# Patient Record
Sex: MALE | Race: BLACK OR AFRICAN AMERICAN | NOT HISPANIC OR LATINO | Employment: UNEMPLOYED | URBAN - METROPOLITAN AREA
[De-identification: names, ages, dates, MRNs, and addresses within clinical notes are randomized per-mention and may not be internally consistent; named-entity substitution may affect disease eponyms.]

---

## 2020-02-18 ENCOUNTER — OFFICE VISIT (OUTPATIENT)
Dept: INTERNAL MEDICINE CLINIC | Facility: CLINIC | Age: 45
End: 2020-02-18
Payer: COMMERCIAL

## 2020-02-18 VITALS
BODY MASS INDEX: 26.8 KG/M2 | TEMPERATURE: 99.2 F | SYSTOLIC BLOOD PRESSURE: 138 MMHG | HEIGHT: 71 IN | DIASTOLIC BLOOD PRESSURE: 80 MMHG | OXYGEN SATURATION: 99 % | HEART RATE: 68 BPM | RESPIRATION RATE: 20 BRPM | WEIGHT: 191.4 LBS

## 2020-02-18 DIAGNOSIS — Z71.6 ENCOUNTER FOR SMOKING CESSATION COUNSELING: ICD-10-CM

## 2020-02-18 DIAGNOSIS — Z13.220 SCREENING FOR HYPERCHOLESTEROLEMIA: Primary | ICD-10-CM

## 2020-02-18 DIAGNOSIS — F17.210 CONTINUOUS DEPENDENCE ON CIGARETTE SMOKING: ICD-10-CM

## 2020-02-18 DIAGNOSIS — Z13.1 SCREENING FOR DIABETES MELLITUS: ICD-10-CM

## 2020-02-18 DIAGNOSIS — R68.3 CLUBBING OF NAIL: ICD-10-CM

## 2020-02-18 DIAGNOSIS — Z11.4 SCREENING FOR HIV (HUMAN IMMUNODEFICIENCY VIRUS): ICD-10-CM

## 2020-02-18 PROBLEM — F51.01 PRIMARY INSOMNIA: Status: ACTIVE | Noted: 2020-02-18

## 2020-02-18 PROCEDURE — 99407 BEHAV CHNG SMOKING > 10 MIN: CPT | Performed by: INTERNAL MEDICINE

## 2020-02-18 PROCEDURE — 3008F BODY MASS INDEX DOCD: CPT | Performed by: INTERNAL MEDICINE

## 2020-02-18 PROCEDURE — 99204 OFFICE O/P NEW MOD 45 MIN: CPT | Performed by: INTERNAL MEDICINE

## 2020-02-18 RX ORDER — VARENICLINE TARTRATE 25 MG
KIT ORAL
Qty: 53 TABLET | Refills: 0 | Status: SHIPPED | OUTPATIENT
Start: 2020-02-18 | End: 2020-09-02 | Stop reason: HOSPADM

## 2020-02-18 NOTE — ASSESSMENT & PLAN NOTE
This is likely secondary to chronic smoking, his oxygen saturation level was 99% now office today  He does not have any shortness of breath, or cough

## 2020-02-18 NOTE — ASSESSMENT & PLAN NOTE
Patient have a very busy schedule, sleeping hygiene was reviewed with the patient  He was instructed not to use electronic device before going to bed, avoid heavy meals, to not smoke before going to bed  I also recommended the use of melatonin 3 mg every night to help him with insomnia  He does have a busy working schedule, and does work night shifts

## 2020-02-18 NOTE — PATIENT INSTRUCTIONS
You can take melatonin 3-5 mg every night to help sleep        How to Stop Smoking, Ambulatory Care   GENERAL INFORMATION:   Why you should stop smoking: You will improve your health and the health of others around you if you stop smoking  Your risk of heart and lung disease, cancer, stroke, heart attack, and vision problems will also decrease  You can benefit from quitting no matter how long you have smoked  Quitting may even prolong your life  Prepare to stop smoking:  Nicotine is a highly addictive drug found in cigarettes  Withdrawal symptoms can happen when you stop smoking and make it hard to quit  These include anxiety, depression, irritability, trouble sleeping, and increased appetite  You increase your chances of success if you prepare to quit  · Set a quit date  This will help confirm your decision to stop smoking  · Tell friends and family that you plan to quit  Explain that you may have withdrawal symptoms when you try to quit  Ask them to support you  They may be able to encourage you and help reduce your stress to make it easier for you to quit  · Expect it to be hard to quit, but know you can do it  Smoking is a daily habit that becomes part of your life  Know the triggers that tempt you to smoke, so you can break this habit  Write down a list of these challenges and have a plan to avoid them  · Remove all tobacco and nicotine products from your home, car, and workplace  Also, remove anything else that will tempt you to smoke, such as lighters, matches, or gisell trays  Tools to help you stop smoking: You may be able to quit on your own, or you may need to try one or more of the following:  · Counseling  from trained caregivers will help teach you skills to quit smoking  They will also teach you to manage your withdrawal symptoms and cravings  You may receive counseling from one counselor, in group therapy, or through phone therapy called a quit line       · Nicotine replacement therapy (NRT)  such as nicotine patches, gum, or lozenges may help reduce your nicotine cravings and other withdrawal symptoms  You may get these without a doctor's order  · Prescription medicines  such as nasal sprays or nicotine inhalers may help reduce your withdrawal symptoms  Other medicines may also be used to reduce your urge to smoke  Ask your primary healthcare provider about these medicines  You may need to start certain medicines 2 weeks before your quit date for them to work well  Manage your cravings:   · Avoid situations, people, and places that tempt you to smoke  Go to nonsmoking places, such as libraries or restaurants  Understand what tempts you and try to avoid these things  · Keep your hands busy  Hold things such as a stress ball or pen  Keep lollipops, gum, or toothpicks in your mouth to distract you from your cravings  · Avoid alcohol and caffeine  These drinks may tempt you to smoke  Drink healthy liquids such as water or juice instead  · Reward yourself when you resist your cravings  Rewards will motivate you and help you stay positive  For more support and information:   · [employer]oke[employer]  [employer]  Phone: 9- 435 - 011-1333  Web Address: www smokefree  [employer]  CARE AGREEMENT:   You have the right to help plan your care  Learn about your health condition and how it may be treated  Discuss treatment options with your caregivers to decide what care you want to receive  You always have the right to refuse treatment  The above information is an  only  It is not intended as medical advice for individual conditions or treatments  Talk to your doctor, nurse or pharmacist before following any medical regimen to see if it is safe and effective for you  © 2014 [employer] [employer] is for End User's use only and may not be sold, redistributed or otherwise used for commercial purposes   All illustrations and images included in CareNotes® are the copyrighted property of [employer]  or Jose Angel Lai

## 2020-02-18 NOTE — ASSESSMENT & PLAN NOTE
Patient has smoked 1 pack a day for the past 25 years  He said he tried nicotine patch and gums in the past and that did not work for him  He also tried to  Stop it cold turkey, but he return to  Smoke  He would like to quit  Will prescribe Chantix  Adverse effects and gradually tapering off the use of cigarettes was reviewed with the patient   Plan is to continue treatment for 12 weeks, depending on how he responds  Supportive information was hand-out to the patient  Will follow-up in 4 weeks

## 2020-02-18 NOTE — PROGRESS NOTES
Assessment/Plan:    Continuous dependence on cigarette smoking  Patient has smoked 1 pack a day for the past 25 years  He said he tried nicotine patch and gums in the past and that did not work for him  He also tried to  Stop it cold turkey, but he return to  Smoke  He would like to quit  Will prescribe Chantix  Adverse effects and gradually tapering off the use of cigarettes was reviewed with the patient   Plan is to continue treatment for 12 weeks, depending on how he responds  Supportive information was hand-out to the patient  Will follow-up in 4 weeks  Clubbing of nail   This is likely secondary to chronic smoking, his oxygen saturation level was 99% now office today  He does not have any shortness of breath, or cough  Primary insomnia   Patient have a very busy schedule, sleeping hygiene was reviewed with the patient  He was instructed not to use electronic device before going to bed, avoid heavy meals, to not smoke before going to bed  I also recommended the use of melatonin 3 mg every night to help him with insomnia  He does have a busy working schedule, and does work night shifts  Patient blood pressure was noted to be 138/80 during this visit give he does not have any history of hypertension  Will monitor and check blood pressure again next visit  Routine blood work was ordered and will call him with results  Patient refused flu vaccine and pneumococcal vaccine today  He states that he might take the pneumococcal vaccine next visit  Diagnoses and all orders for this visit:    Screening for hypercholesterolemia  -     Lipid Panel with Direct LDL reflex; Future    Continuous dependence on cigarette smoking  -     varenicline (CHANTIX GIOVANI) 0 5 MG X 11 & 1 MG X 42 tablet; Take one 0 5 mg tablet by mouth once daily for 3 days, then one 0 5 mg tablet by mouth twice daily for 4 days, then one 1 mg tablet by mouth twice daily      Encounter for smoking cessation counseling  - varenicline (CHANTIX GIOVANI) 0 5 MG X 11 & 1 MG X 42 tablet; Take one 0 5 mg tablet by mouth once daily for 3 days, then one 0 5 mg tablet by mouth twice daily for 4 days, then one 1 mg tablet by mouth twice daily  Screening for diabetes mellitus  -     HEMOGLOBIN A1C W/ EAG ESTIMATION; Future    Clubbing of nail  -     CBC and Platelet; Future  -     Comprehensive metabolic panel; Future    Screening for HIV (human immunodeficiency virus)  -     HIV 1/2 AG-AB combo; Future            Subjective:      Patient ID: Kristie Billings is a 38 y o  male  Mr Sean Btets   Is here to establish care  He states that  He has been many years since he saw a medical provider  He denies any medical problem  He denies any current symptoms  He does not have any shortness of breath, cough, chest pain, abdominal pain, nausea, vomiting or diarrhea  He states that he  Smokes 1 pack a day for the past 25 years and he would like to quit  He said he tried multiple modalities and it did not work for him  He said his sister used Chantix in eat worked for her very well  She stopped smoking  He is willing to try  He also is having difficulty with sleeping, he has very busy working schedule, he works night shifts and works 6 days in a week  He would not like to use any addictive medication to make him sleep, but he would like some help  Patient states that he was told his finger tips are big and he is concerned about that  He denies any pain or trauma  His blood pressure in the office was 138/80, he does not recall any history of elevated BP in the past    He has a strong family history of diabetes, both parents and siblings were diabetic  The following portions of the patient's history were reviewed and updated as appropriate: allergies, current medications, past family history, past medical history, past social history, past surgical history and problem list     Review of Systems   Constitutional: Positive for fatigue  Negative for appetite change  HENT: Negative for sore throat and trouble swallowing  Eyes: Negative for visual disturbance  Respiratory: Negative for cough, chest tightness, shortness of breath and wheezing  Cardiovascular: Negative for chest pain, palpitations and leg swelling  Gastrointestinal: Negative for abdominal pain, nausea and vomiting  Genitourinary: Negative for difficulty urinating and frequency  Musculoskeletal: Negative for arthralgias and joint swelling  Skin: Negative for rash  Neurological: Negative for dizziness and headaches  Psychiatric/Behavioral: Positive for sleep disturbance  The patient is not nervous/anxious  Objective:      /80 (BP Location: Left arm, Patient Position: Sitting, Cuff Size: Standard)   Pulse 68   Temp 99 2 °F (37 3 °C)   Resp 20   Ht 5' 11 25" (1 81 m)   Wt 86 8 kg (191 lb 6 4 oz)   SpO2 99%   BMI 26 51 kg/m²          Physical Exam   Constitutional: He is oriented to person, place, and time  He appears well-developed and well-nourished  HENT:   Head: Normocephalic and atraumatic  Eyes: Pupils are equal, round, and reactive to light  Conjunctivae and EOM are normal    Neck: Normal range of motion  Neck supple  No thyromegaly present  Cardiovascular: Normal rate, regular rhythm and normal heart sounds  Pulmonary/Chest: Breath sounds normal  No respiratory distress  He has no wheezes  Abdominal: Soft  Bowel sounds are normal  He exhibits no distension  There is no tenderness  Musculoskeletal: Normal range of motion  He exhibits no edema  Lymphadenopathy:     He has no cervical adenopathy  Neurological: He is alert and oriented to person, place, and time  No sensory deficit  He exhibits normal muscle tone  Skin: Skin is warm and dry  Capillary refill takes less than 2 seconds  Clubbing of the nails noted on all fingers   Psychiatric: He has a normal mood and affect  Nursing note and vitals reviewed

## 2020-09-01 ENCOUNTER — APPOINTMENT (EMERGENCY)
Dept: CT IMAGING | Facility: HOSPITAL | Age: 45
End: 2020-09-01
Payer: COMMERCIAL

## 2020-09-01 ENCOUNTER — HOSPITAL ENCOUNTER (OUTPATIENT)
Facility: HOSPITAL | Age: 45
Setting detail: OBSERVATION
LOS: 1 days | Discharge: HOME | End: 2020-09-02
Attending: HOSPITALIST | Admitting: HOSPITALIST
Payer: COMMERCIAL

## 2020-09-01 ENCOUNTER — HOSPITAL ENCOUNTER (EMERGENCY)
Facility: HOSPITAL | Age: 45
End: 2020-09-01
Attending: EMERGENCY MEDICINE | Admitting: EMERGENCY MEDICINE
Payer: COMMERCIAL

## 2020-09-01 ENCOUNTER — APPOINTMENT (OUTPATIENT)
Dept: MRI IMAGING | Facility: HOSPITAL | Age: 45
End: 2020-09-01
Payer: COMMERCIAL

## 2020-09-01 VITALS
TEMPERATURE: 98.5 F | RESPIRATION RATE: 18 BRPM | DIASTOLIC BLOOD PRESSURE: 82 MMHG | HEART RATE: 66 BPM | OXYGEN SATURATION: 98 % | SYSTOLIC BLOOD PRESSURE: 125 MMHG

## 2020-09-01 DIAGNOSIS — R00.2 PALPITATIONS: Primary | ICD-10-CM

## 2020-09-01 DIAGNOSIS — R20.0 NUMBNESS AND TINGLING OF RIGHT ARM AND LEG: ICD-10-CM

## 2020-09-01 DIAGNOSIS — R20.2 NUMBNESS AND TINGLING OF RIGHT ARM AND LEG: ICD-10-CM

## 2020-09-01 DIAGNOSIS — F17.210 CONTINUOUS DEPENDENCE ON CIGARETTE SMOKING: Primary | ICD-10-CM

## 2020-09-01 PROBLEM — G45.9 TIA (TRANSIENT ISCHEMIC ATTACK): Status: ACTIVE | Noted: 2020-09-01

## 2020-09-01 PROBLEM — Z72.89 ALCOHOL USE: Status: ACTIVE | Noted: 2020-09-01

## 2020-09-01 PROBLEM — Z78.9 ALCOHOL USE: Status: ACTIVE | Noted: 2020-09-01

## 2020-09-01 LAB
ANION GAP SERPL CALCULATED.3IONS-SCNC: 8 MMOL/L (ref 4–13)
APTT PPP: 28 SECONDS (ref 23–31)
BUN SERPL-MCNC: 12 MG/DL (ref 6–20)
CALCIUM SERPL-MCNC: 9.4 MG/DL (ref 8.4–10.2)
CHLORIDE SERPL-SCNC: 102 MMOL/L (ref 96–108)
CO2 SERPL-SCNC: 29 MMOL/L (ref 22–33)
CREAT SERPL-MCNC: 1.05 MG/DL (ref 0.5–1.2)
ERYTHROCYTE [DISTWIDTH] IN BLOOD BY AUTOMATED COUNT: 15.2 % (ref 11.6–15.1)
GFR SERPL CREATININE-BSD FRML MDRD: 99 ML/MIN/1.73SQ M
GLUCOSE SERPL-MCNC: 133 MG/DL (ref 65–140)
GLUCOSE SERPL-MCNC: 97 MG/DL (ref 65–140)
HCT VFR BLD AUTO: 39.5 % (ref 36.5–49.3)
HGB BLD-MCNC: 13.2 G/DL (ref 12–17)
INR PPP: 1.01 (ref 0.9–1.1)
MCH RBC QN AUTO: 27 PG (ref 26.8–34.3)
MCHC RBC AUTO-ENTMCNC: 33.4 G/DL (ref 31.4–37.4)
MCV RBC AUTO: 81 FL (ref 82–98)
PLATELET # BLD AUTO: 289 THOUSANDS/UL (ref 149–390)
PMV BLD AUTO: 10.2 FL (ref 8.9–12.7)
POTASSIUM SERPL-SCNC: 3.2 MMOL/L (ref 3.5–5)
PROTHROMBIN TIME: 10.7 SECONDS (ref 9.5–12.1)
RBC # BLD AUTO: 4.88 MILLION/UL (ref 3.88–5.62)
SARS-COV-2 RNA RESP QL NAA+PROBE: NEGATIVE
SODIUM SERPL-SCNC: 139 MMOL/L (ref 133–145)
TROPONIN I SERPL-MCNC: <0.03 NG/ML (ref 0–0.07)
WBC # BLD AUTO: 9.13 THOUSAND/UL (ref 4.31–10.16)

## 2020-09-01 PROCEDURE — G1004 CDSM NDSC: HCPCS

## 2020-09-01 PROCEDURE — G0379 DIRECT REFER HOSPITAL OBSERV: HCPCS

## 2020-09-01 PROCEDURE — 70498 CT ANGIOGRAPHY NECK: CPT

## 2020-09-01 PROCEDURE — 84484 ASSAY OF TROPONIN QUANT: CPT | Performed by: EMERGENCY MEDICINE

## 2020-09-01 PROCEDURE — 85730 THROMBOPLASTIN TIME PARTIAL: CPT | Performed by: EMERGENCY MEDICINE

## 2020-09-01 PROCEDURE — 85027 COMPLETE CBC AUTOMATED: CPT | Performed by: EMERGENCY MEDICINE

## 2020-09-01 PROCEDURE — 80048 BASIC METABOLIC PNL TOTAL CA: CPT | Performed by: EMERGENCY MEDICINE

## 2020-09-01 PROCEDURE — 87635 SARS-COV-2 COVID-19 AMP PRB: CPT | Performed by: EMERGENCY MEDICINE

## 2020-09-01 PROCEDURE — 99285 EMERGENCY DEPT VISIT HI MDM: CPT

## 2020-09-01 PROCEDURE — 74175 CTA ABDOMEN W/CONTRAST: CPT

## 2020-09-01 PROCEDURE — 99220 PR INITIAL OBSERVATION CARE/DAY 70 MINUTES: CPT | Performed by: HOSPITALIST

## 2020-09-01 PROCEDURE — 71275 CT ANGIOGRAPHY CHEST: CPT

## 2020-09-01 PROCEDURE — 36415 COLL VENOUS BLD VENIPUNCTURE: CPT | Performed by: EMERGENCY MEDICINE

## 2020-09-01 PROCEDURE — 82948 REAGENT STRIP/BLOOD GLUCOSE: CPT

## 2020-09-01 PROCEDURE — 99291 CRITICAL CARE FIRST HOUR: CPT | Performed by: EMERGENCY MEDICINE

## 2020-09-01 PROCEDURE — 93005 ELECTROCARDIOGRAM TRACING: CPT

## 2020-09-01 PROCEDURE — 70496 CT ANGIOGRAPHY HEAD: CPT

## 2020-09-01 PROCEDURE — 85610 PROTHROMBIN TIME: CPT | Performed by: EMERGENCY MEDICINE

## 2020-09-01 PROCEDURE — 70551 MRI BRAIN STEM W/O DYE: CPT

## 2020-09-01 RX ORDER — NICOTINE 21 MG/24HR
1 PATCH, TRANSDERMAL 24 HOURS TRANSDERMAL DAILY
Status: DISCONTINUED | OUTPATIENT
Start: 2020-09-01 | End: 2020-09-02 | Stop reason: HOSPADM

## 2020-09-01 RX ORDER — LANOLIN ALCOHOL/MO/W.PET/CERES
3 CREAM (GRAM) TOPICAL
Status: DISCONTINUED | OUTPATIENT
Start: 2020-09-01 | End: 2020-09-02 | Stop reason: HOSPADM

## 2020-09-01 RX ORDER — ASPIRIN 81 MG/1
81 TABLET, CHEWABLE ORAL DAILY
Status: DISCONTINUED | OUTPATIENT
Start: 2020-09-01 | End: 2020-09-02 | Stop reason: HOSPADM

## 2020-09-01 RX ORDER — ACETAMINOPHEN 325 MG/1
650 TABLET ORAL EVERY 4 HOURS PRN
Status: DISCONTINUED | OUTPATIENT
Start: 2020-09-01 | End: 2020-09-02 | Stop reason: HOSPADM

## 2020-09-01 RX ORDER — ATORVASTATIN CALCIUM 40 MG/1
40 TABLET, FILM COATED ORAL EVERY EVENING
Status: DISCONTINUED | OUTPATIENT
Start: 2020-09-01 | End: 2020-09-02 | Stop reason: HOSPADM

## 2020-09-01 RX ADMIN — ATORVASTATIN CALCIUM 40 MG: 40 TABLET, FILM COATED ORAL at 17:35

## 2020-09-01 RX ADMIN — ENOXAPARIN SODIUM 40 MG: 40 INJECTION, SOLUTION INTRAVENOUS; SUBCUTANEOUS at 16:29

## 2020-09-01 RX ADMIN — NICOTINE 1 PATCH: 21 PATCH TRANSDERMAL at 16:29

## 2020-09-01 RX ADMIN — IOHEXOL 125 ML: 350 INJECTION, SOLUTION INTRAVENOUS at 09:05

## 2020-09-01 RX ADMIN — MELATONIN 3 MG: at 22:04

## 2020-09-01 RX ADMIN — ASPIRIN 81 MG 81 MG: 81 TABLET ORAL at 16:29

## 2020-09-01 NOTE — PLAN OF CARE
Problem: PAIN - ADULT  Goal: Verbalizes/displays adequate comfort level or baseline comfort level  Description: Interventions:  - Encourage patient to monitor pain and request assistance  - Assess pain using appropriate pain scale  - Administer analgesics based on type and severity of pain and evaluate response  - Implement non-pharmacological measures as appropriate and evaluate response  - Consider cultural and social influences on pain and pain management  - Notify physician/advanced practitioner if interventions unsuccessful or patient reports new pain  Outcome: Progressing     Problem: SAFETY ADULT  Goal: Patient will remain free of falls  Description: INTERVENTIONS:  - Assess patient frequently for physical needs  -  Identify cognitive and physical deficits and behaviors that affect risk of falls  -  [place] fall precautions as indicated by assessment   - Educate patient/family on patient safety including physical limitations  - Instruct patient to call for assistance with activity based on assessment  - Modify environment to reduce risk of injury  - Consider OT/PT consult to assist with strengthening/mobility  Outcome: Progressing     Problem: Knowledge Deficit  Goal: Patient/family/caregiver demonstrates understanding of disease process, treatment plan, medications, and discharge instructions  Description: Complete learning assessment and assess knowledge base    Interventions:  - Provide teaching at level of understanding  - Provide teaching via preferred learning methods  Outcome: Progressing     Problem: NEUROSENSORY - ADULT  Goal: Achieves stable or improved neurological status  Description: INTERVENTIONS  - Monitor and report changes in neurological status  - Monitor vital signs such as temperature, blood pressure, glucose, and any other labs ordered   - Initiate measures to prevent increased intracranial pressure  - Monitor for seizure activity and implement precautions if appropriate      Outcome: Progressing  Goal: Achieves maximal functionality and self care  Description: INTERVENTIONS  - Monitor swallowing and airway patency with patient fatigue and changes in neurological status  - Encourage and assist patient to increase activity and self care     - Encourage visually impaired, hearing impaired and aphasic patients to use assistive/communication devices  Outcome: Progressing     Problem: CARDIOVASCULAR - ADULT  Goal: Maintains optimal cardiac output and hemodynamic stability  Description: INTERVENTIONS:  - Monitor I/O, vital signs and rhythm  - Monitor for S/S and trends of decreased cardiac output  - Administer and titrate ordered vasoactive medications to optimize hemodynamic stability  - Assess quality of pulses, skin color and temperature  - Assess for signs of decreased coronary artery perfusion  - Instruct patient to report change in severity of symptoms  Outcome: Progressing  Goal: Absence of cardiac dysrhythmias or at baseline rhythm  Description: INTERVENTIONS:  - Continuous cardiac monitoring, vital signs, obtain 12 lead EKG if ordered  - Administer antiarrhythmic and heart rate control medications as ordered  - Monitor electrolytes and administer replacement therapy as ordered  Outcome: Progressing     Problem: MUSCULOSKELETAL - ADULT  Goal: Maintain or return mobility to safest level of function  Description: INTERVENTIONS:  - Assess patient's ability to carry out ADLs; assess patient's baseline for ADL function and identify physical deficits which impact ability to perform ADLs (bathing, care of mouth/teeth, toileting, grooming, dressing, etc )  - Assess/evaluate cause of self-care deficits   - Assess range of motion  - Assess patient's mobility  - Assess patient's need for assistive devices and provide as appropriate  - Encourage maximum independence but intervene and supervise when necessary  - Involve family in performance of ADLs  - Assess for home care needs following discharge   - Consider OT consult to assist with ADL evaluation and planning for discharge  - Provide patient education as appropriate  Outcome: Progressing  Goal: Maintain proper alignment of affected body part  Description: INTERVENTIONS:  - Support, maintain and protect limb and body alignment  - Provide patient/ family with appropriate education  Outcome: Progressing

## 2020-09-01 NOTE — ASSESSMENT & PLAN NOTE
Patient presented with the right upper and lower extremity numbness and subjective weakness  No facial involvement drooling of saliva or diplopia  Sudden onset this morning lasting about an hour  Also associated with some palpitations and chest tightness  Patient had a leftover [place] male 1 hour prior to the onset of this symptom and some left toe or food last night as well  Currently without any symptoms  First such episode  Clinical exam unremarkable  No risk factors for TIA currently could be is secondary to mild food poisoning however would need an MRI to definitively rule out a TIA  Will put him on the TIA/stroke pathway till MRI results available  Start aspirin and statin for now  Can discontinue if MRI negative  Patient ambulatory without any complaints currently

## 2020-09-01 NOTE — H&P
H&P- Pawan Carmen 1975, 41 y o  male MRN: [id number]    Unit/Bed#: S -01 Encounter: [id number]    Primary Care Provider: Jayant Yi MD   Date and time admitted to hospital: 9/1/2020  1:25 PM        * TIA (transient ischemic attack)  Assessment & Plan  Patient presented with the right upper and lower extremity numbness and subjective weakness  No facial involvement drooling of saliva or diplopia  Sudden onset this morning lasting about an hour  Also associated with some palpitations and chest tightness  Patient had a leftover [place] male 1 hour prior to the onset of this symptom and some left toe or food last night as well  Currently without any symptoms  First such episode  Clinical exam unremarkable  No risk factors for TIA currently could be is secondary to mild food poisoning however would need an MRI to definitively rule out a TIA  Will put him on the TIA/stroke pathway till MRI results available  Start aspirin and statin for now  Can discontinue if MRI negative  Patient ambulatory without any complaints currently  Continuous dependence on cigarette smoking  Assessment & Plan  Patient admits to smoking a pack a day and possibly more while at work for the past 25 years  Counseled with regards to this adverse effects of smoking and the need to stop  He is pre contemplative  Agreeable to a nicotine patch  Alcohol use  Assessment & Plan  Drinks 1-2 glasses of whiskey a day  Last drink was last night  Denies any withdrawal symptoms in the past   No prior history of jaundice  Currently no evidence of any withdrawal     Primary insomnia  Assessment & Plan  Will add melatonin for insomnia        History of Present Illness     HPI:   Michel Smith is a 42-year-old male with no prior history of any acute medical issues who was at work this morning when he suddenly developed palpitations, chest pressure, tingling numbness involving the right lower extremity followed by numbness involving the right upper extremity  No weakness  No drooling of saliva hours speech impairment  No diplopia  He then presented to the ER at [facility]   He had symptoms improved thereafter  He states that the whole episode lasted about an hour  He ate leftover [place] food 1 hour prior to the onset of the symptoms in addition he also had some leftover food last night  No prior episodes involving similar symptoms  Currently patient is asymptomatic  No fever, headaches, cough, diarrhea  Denies any significant past medical history  Patient is not on any medications at home  Patient transferred here to rule out a TIA and for an MRI  Patient currently has no chest pressure  EKG has been normal   Troponin negative as well  Historical Information   No past medical history on file  Patient Active Problem List   Diagnosis    Continuous dependence on cigarette smoking    Clubbing of nail    Primary insomnia    TIA (transient ischemic attack)    Alcohol use     No past surgical history on file  Social History   Social History     Substance and Sexual Activity   Alcohol Use Yes    Frequency: 2-3 times a week    Drinks per session: 1 or 2    Binge frequency: Never     Social History     Substance and Sexual Activity   Drug Use Never     Social History     Tobacco Use   Smoking Status Current Every Day Smoker    Packs/day: 1 00    Years: 25 00    Pack years: 25 00    Types: Cigarettes    Start date: 2/18/1995   Smokeless Tobacco Never Used       Family History: Mother and sister with the history of diabetes    Meds/Allergies       Current Facility-Administered Medications:     acetaminophen (TYLENOL) tablet 650 mg, 650 mg, Oral, Q4H PRN, Mauricio Stovall MD    aspirin chewable tablet 81 mg, 81 mg, Oral, Daily, Mauricio Stovall MD    atorvastatin (LIPITOR) tablet 40 mg, 40 mg, Oral, QPM, Olegario Wallace MD    enoxaparin (LOVENOX) subcutaneous injection 40 mg, 40 mg, Subcutaneous, Q24H [address], Linda Dang MD    melatonin tablet 3 mg, 3 mg, Oral, HS PRN, Linda Dang MD    nicotine (NICODERM CQ) 21 mg/24 hr TD 24 hr patch 1 patch, 1 patch, Transdermal, Daily, Linda Dang MD    No Known Allergies    Review of Systems  A detailed 12 point review of systems was conducted and is negative apart from those mentioned in the HPI  Objective   Vitals: Blood pressure 123/61, pulse 55, temperature 98 1 °F (36 7 °C), temperature source Oral, resp  rate 18, height 5' 11" (1 803 m), weight 86 8 kg (191 lb 5 8 oz), SpO2 97 %  Physical Exam   HEENT: PERRLA, EOMI, sclera anicteric, dry mucous membranes, tongue mucosa dry without lesions  Neck: supple, no JVD, lymphadenopathy, thyromegaly  Heart: Regular rate and rhythm, S1S2 present  No murmur, rub or gallop  Lungs; Clear to auscultation bilaterally  No wheezing, crackles or rhonchi  No accessory muscle use or respiratory distress  Abdomen: soft, non-tender, non-distended, NABS  No guarding or rebound  No peritoneal sound or mass  Extremities: no clubbing, cyanosis, or edema  2+ pedal pulses bilaterally  Full range of motion  Neurologic:  Cranial nerves II-XII intact  Strength and sensation globally intact  Speech fluent and goal directed  Awake, alert and oriented x 3  Skin: warm and dry  No petechiae, purpura or rash  Lab Results:     Results from last 7 days   Lab Units 09/01/20  0815   WBC Thousand/uL 9 13   HEMOGLOBIN g/dL 13 2   HEMATOCRIT % 39 5   PLATELETS Thousands/uL 289     Results from last 7 days   Lab Units 09/01/20  0815   POTASSIUM mmol/L 3 2*   CHLORIDE mmol/L 102   CO2 mmol/L 29   BUN mg/dL 12   CREATININE mg/dL 1 05   CALCIUM mg/dL 9 4     Results from last 7 days   Lab Units 09/01/20  0815   INR  1 01           Imaging:  CT without evidence of any infarct or mass  EKG-normal sinus rhythm without evidence of any ischemia or arrhythmia              Code Status: Level 1 - Full Code      Counseling / Coordination of Care  Total floor / unit time spent today 25 minutes  Greater than 50% of total time was spent with the patient and / or family counseling and / or coordination of care  Portions of the record may have been created with voice recognition software  Occasional wrong word or "sound a like" substitutions may have occurred due to the inherent limitations of voice recognition software  Read the chart carefully and recognize, using context, where substitutions have occurred

## 2020-09-01 NOTE — ASSESSMENT & PLAN NOTE
Patient admits to smoking a pack a day and possibly more while at work for the past 25 years  Counseled with regards to this adverse effects of smoking and the need to stop  He is pre contemplative  Agreeable to a nicotine patch

## 2020-09-01 NOTE — ED NOTES
Report to be given to Fredis(nurse) @ [phone]  Attempted to call report at this time but nurse was unavilable        202 Santy Green, RN  09/01/20 [id number]

## 2020-09-01 NOTE — ASSESSMENT & PLAN NOTE
Drinks 1-2 glasses of whiskey a day  Last drink was last night  Denies any withdrawal symptoms in the past   No prior history of jaundice    Currently no evidence of any withdrawal

## 2020-09-01 NOTE — ED NOTES
Patient is resting comfortably in room  Eyes are closed, respirations even and unlabored  Will continue to monitor       202 Santy Green, RN  09/01/20 [id number]

## 2020-09-01 NOTE — PLAN OF CARE
Problem: PAIN - ADULT  Goal: Verbalizes/displays adequate comfort level or baseline comfort level  Description: Interventions:  - Encourage patient to monitor pain and request assistance  - Assess pain using appropriate pain scale  - Administer analgesics based on type and severity of pain and evaluate response  - Implement non-pharmacological measures as appropriate and evaluate response  - Consider cultural and social influences on pain and pain management  - Notify physician/advanced practitioner if interventions unsuccessful or patient reports new pain  Outcome: Progressing     Problem: SAFETY ADULT  Goal: Patient will remain free of falls  Description: INTERVENTIONS:  - Assess patient frequently for physical needs  -  Identify cognitive and physical deficits and behaviors that affect risk of falls  -  [place] fall precautions as indicated by assessment   - Educate patient/family on patient safety including physical limitations  - Instruct patient to call for assistance with activity based on assessment  - Modify environment to reduce risk of injury  - Consider OT/PT consult to assist with strengthening/mobility  Outcome: Progressing     Problem: Knowledge Deficit  Goal: Patient/family/caregiver demonstrates understanding of disease process, treatment plan, medications, and discharge instructions  Description: Complete learning assessment and assess knowledge base    Interventions:  - Provide teaching at level of understanding  - Provide teaching via preferred learning methods  Outcome: Progressing     Problem: NEUROSENSORY - ADULT  Goal: Achieves stable or improved neurological status  Description: INTERVENTIONS  - Monitor and report changes in neurological status  - Monitor vital signs such as temperature, blood pressure, glucose, and any other labs ordered   - Initiate measures to prevent increased intracranial pressure  - Monitor for seizure activity and implement precautions if appropriate      Outcome: Progressing  Goal: Achieves maximal functionality and self care  Description: INTERVENTIONS  - Monitor swallowing and airway patency with patient fatigue and changes in neurological status  - Encourage and assist patient to increase activity and self care     - Encourage visually impaired, hearing impaired and aphasic patients to use assistive/communication devices  Outcome: Progressing     Problem: CARDIOVASCULAR - ADULT  Goal: Maintains optimal cardiac output and hemodynamic stability  Description: INTERVENTIONS:  - Monitor I/O, vital signs and rhythm  - Monitor for S/S and trends of decreased cardiac output  - Administer and titrate ordered vasoactive medications to optimize hemodynamic stability  - Assess quality of pulses, skin color and temperature  - Assess for signs of decreased coronary artery perfusion  - Instruct patient to report change in severity of symptoms  Outcome: Progressing  Goal: Absence of cardiac dysrhythmias or at baseline rhythm  Description: INTERVENTIONS:  - Continuous cardiac monitoring, vital signs, obtain 12 lead EKG if ordered  - Administer antiarrhythmic and heart rate control medications as ordered  - Monitor electrolytes and administer replacement therapy as ordered  Outcome: Progressing     Problem: MUSCULOSKELETAL - ADULT  Goal: Maintain or return mobility to safest level of function  Description: INTERVENTIONS:  - Assess patient's ability to carry out ADLs; assess patient's baseline for ADL function and identify physical deficits which impact ability to perform ADLs (bathing, care of mouth/teeth, toileting, grooming, dressing, etc )  - Assess/evaluate cause of self-care deficits   - Assess range of motion  - Assess patient's mobility  - Assess patient's need for assistive devices and provide as appropriate  - Encourage maximum independence but intervene and supervise when necessary  - Involve family in performance of ADLs  - Assess for home care needs following discharge   - Consider OT consult to assist with ADL evaluation and planning for discharge  - Provide patient education as appropriate  Outcome: Progressing  Goal: Maintain proper alignment of affected body part  Description: INTERVENTIONS:  - Support, maintain and protect limb and body alignment  - Provide patient/ family with appropriate education  Outcome: Progressing

## 2020-09-01 NOTE — ED PROVIDER NOTES
History  Chief Complaint   Patient presents with    Chest Pain     chest pain and weakness     This is a 41-year-old male who presents emergency department with sudden onset of heart palpitations described as "heart pounding in my chest "  Developed some right leg numbness followed by right arm numbness as well  One time he states that both legs were having tingling but ultimately was right leg and right arm that persisted  No prior history of similar symptoms  No chest pain  No focal weakness  He does have persistent numbness in his right arm and right leg  It is subjective  Describes it as the tingling sensation that occurs after your arm starts waking up from being asleep  No headache  No vision changes  No speech deficit  Moderate severity  Seems to be resolving  No aggravating or alleviating factors  Sudden onset approximately 1 hour prior to arrival   He does not know exact time but states that was clearly within an hour  Differential diagnosis includes stroke, cardiac event, arrhythmia, dissection  Prior to Admission Medications   Prescriptions Last Dose Informant Patient Reported? Taking?   varenicline (CHANTIX GIOVANI) 0 5 MG X 11 & 1 MG X 42 tablet   No No   Sig: Take one 0 5 mg tablet by mouth once daily for 3 days, then one 0 5 mg tablet by mouth twice daily for 4 days, then one 1 mg tablet by mouth twice daily  Facility-Administered Medications: None       History reviewed  No pertinent past medical history  History reviewed  No pertinent surgical history  History reviewed  No pertinent family history  I have reviewed and agree with the history as documented      E-Cigarette/Vaping    E-Cigarette Use Never User      E-Cigarette/Vaping Substances     Social History     Tobacco Use    Smoking status: Current Every Day Smoker     Packs/day: 1 00     Years: 25 00     Pack years: 25 00     Types: Cigarettes     Start date: 2/18/1995    Smokeless tobacco: Never Used Substance Use Topics    Alcohol use: Yes     Frequency: 2-3 times a week     Drinks per session: 1 or 2     Binge frequency: Never    Drug use: Never       Review of Systems   Constitutional: Negative for activity change, appetite change and fever  HENT: Negative for congestion, ear pain, rhinorrhea and sore throat  Eyes: Negative for pain and redness  Respiratory: Positive for chest tightness  Negative for cough, shortness of breath and wheezing  Cardiovascular: Positive for palpitations  Negative for chest pain  Gastrointestinal: Negative for abdominal pain, diarrhea, nausea and vomiting  Endocrine: Negative for polyuria  Genitourinary: Negative for difficulty urinating, dysuria, frequency and urgency  Musculoskeletal: Negative for arthralgias and myalgias  Skin: Negative for color change and rash  Allergic/Immunologic: Negative for immunocompromised state  Neurological: Positive for numbness  Negative for dizziness, syncope, facial asymmetry, speech difficulty, weakness, light-headedness and headaches  Hematological: Does not bruise/bleed easily  Psychiatric/Behavioral: Negative for confusion  All other systems reviewed and are negative  Physical Exam  Physical Exam  Vitals signs and nursing note reviewed  Constitutional:       General: He is not in acute distress  Appearance: He is well-developed  HENT:      Head: Normocephalic and atraumatic  Nose: Nose normal    Eyes:      General: No visual field deficit or scleral icterus  Extraocular Movements: Extraocular movements intact  Conjunctiva/sclera: Conjunctivae normal       Pupils: Pupils are equal, round, and reactive to light  Neck:      Musculoskeletal: Normal range of motion and neck supple  Cardiovascular:      Rate and Rhythm: Normal rate and regular rhythm  Heart sounds: Normal heart sounds  Pulmonary:      Effort: Pulmonary effort is normal  No respiratory distress        Breath sounds: Normal breath sounds  No stridor  No wheezing  Abdominal:      General: There is no distension  Palpations: Abdomen is soft  Tenderness: There is no abdominal tenderness  There is no guarding or rebound  Musculoskeletal: Normal range of motion  General: No deformity  Right lower leg: No edema  Left lower leg: No edema  Skin:     General: Skin is warm and dry  Findings: No rash  Neurological:      General: No focal deficit present  Mental Status: He is alert and oriented to person, place, and time  Cranial Nerves: No cranial nerve deficit, dysarthria or facial asymmetry  Sensory: Sensation is intact  No sensory deficit (Subjective sensory deficit but no focal deficit to light touch )  Motor: Motor function is intact  No weakness, abnormal muscle tone or pronator drift  Coordination: Coordination is intact  Coordination normal    Psychiatric:         Thought Content:  Thought content normal          Vital Signs  ED Triage Vitals   Temperature Pulse Respirations Blood Pressure SpO2   09/01/20 0800 09/01/20 0800 09/01/20 0800 09/01/20 0802 09/01/20 0800   98 5 °F (36 9 °C) 99 18 159/87 97 %      Temp Source Heart Rate Source Patient Position - Orthostatic VS BP Location FiO2 (%)   09/01/20 0800 09/01/20 0800 09/01/20 0800 09/01/20 0800 --   Tympanic Monitor Lying Right arm       Pain Score       09/01/20 1034       No Pain           Vitals:    09/01/20 0856 09/01/20 0932 09/01/20 1034 09/01/20 1131   BP: 123/71 118/76 123/77 125/82   Pulse: 78 74 71 66   Patient Position - Orthostatic VS: Lying Lying Lying Lying         Visual Acuity      ED Medications  Medications   iohexol (OMNIPAQUE) 350 MG/ML injection (MULTI-DOSE) 100 mL (125 mL Intravenous Given 9/1/20 0905)       Diagnostic Studies  Results Reviewed     Procedure Component Value Units Date/Time    Novel Coronavirus [facility] [[id number]]  (Normal) Collected:  09/01/20 0921    Lab Status:  Final result Specimen:  Nares from Nose Updated:  09/01/20 1142     SARS-CoV-2 Negative    Narrative: The specimen collection materials, transport medium, and/or testing methodology utilized in the production of these test results have been proven to be reliable in a limited validation with an abbreviated program under the Emergency Utilization Authorization provided by the FDA  Testing reported as "Presumptive positive" will be confirmed with secondary testing with a reference laboratory to ensure result accuracy  Clinical caution and judgement should be used with the interpretation of these results with consideration of the clinical impression and other laboratory testing  Testing reported as "Positive" or "Negative" has been proven to be accurate according to standard laboratory validation requirements  All testing is performed with control materials showing appropriate reactivity at standard intervals        Basic metabolic panel [[id number]]  (Abnormal) Collected:  09/01/20 0815    Lab Status:  Final result Specimen:  Blood from Arm, Left Updated:  09/01/20 0906     Sodium 139 mmol/L      Potassium 3 2 mmol/L      Chloride 102 mmol/L      CO2 29 mmol/L      ANION GAP 8 mmol/L      BUN 12 mg/dL      Creatinine 1 05 mg/dL      Glucose 133 mg/dL      Calcium 9 4 mg/dL      eGFR 99 ml/min/1 73sq m     Narrative:       [place] guidelines for Chronic Kidney Disease (CKD):     Stage 1 with normal or high GFR (GFR > 90 mL/min/1 73 square meters)    Stage 2 Mild CKD (GFR = 60-89 mL/min/1 73 square meters)    Stage 3A Moderate CKD (GFR = 45-59 mL/min/1 73 square meters)    Stage 3B Moderate CKD (GFR = 30-44 mL/min/1 73 square meters)    Stage 4 Severe CKD (GFR = 15-29 mL/min/1 73 square meters)    Stage 5 End Stage CKD (GFR <15 mL/min/1 73 square meters)  Note: GFR calculation is accurate only with a steady state creatinine    Troponin I [[id number]]  (Normal) Collected: 09/01/20 0815    Lab Status:  Final result Specimen:  Blood from Arm, Left Updated:  09/01/20 0851     Troponin I <0 03 ng/mL     CBC and Platelet [[id number]]  (Abnormal) Collected:  09/01/20 0815    Lab Status:  Final result Specimen:  Blood from Arm, Left Updated:  09/01/20 0850     WBC 9 13 Thousand/uL      RBC 4 88 Million/uL      Hemoglobin 13 2 g/dL      Hematocrit 39 5 %      MCV 81 fL      MCH 27 0 pg      MCHC 33 4 g/dL      RDW 15 2 %      Platelets 055 Thousands/uL      MPV 10 2 fL     Protime-INR [[id number]]  (Normal) Collected:  09/01/20 0815    Lab Status:  Final result Specimen:  Blood from Arm, Left Updated:  09/01/20 0850     Protime 10 7 seconds      INR 1 01    Narrative:       INR Reference Ranges:  No Anticoagulant, Normal:           0 9-1 1  Standard Dose, Oral Anticoagulant:  2 0-3 0  High Dose, Oral Anticoagulant:      2 5-3 5    APTT [[id number]]  (Normal) Collected:  09/01/20 0815    Lab Status:  Final result Specimen:  Blood from Arm, Left Updated:  09/01/20 0850     PTT 28 seconds     Fingerstick Glucose (POCT) [[id number]]  (Normal) Collected:  09/01/20 0809    Lab Status:  Final result Updated:  09/01/20 0810     POC Glucose 97 mg/dl                  CTA dissection protocol chest and abdomen   Final Result by Chase Saunders MD (09/01 4639)      No evidence of aortic dissection or aneurysm  Scattered subcentimeter para-aortic lymph nodes and mildly enlarged peripancreatic lymph nodes  This is a nonspecific finding  Correlation with prior imaging and/or follow-up evaluation of the abdomen and pelvis is recommended in 3 months  Hepatic steatosis  Fat-containing umbilical hernia without inflammatory changes  Workstation performed: [id number]         CTA stroke alert (head/neck)   Final Result by Chase Saunders MD (09/01 5579)      No intracranial hemorrhage  Focal hypodensity within the left elissa may represent artifact rather than lacunar infarction    MR imaging could be utilized for additional characterization if clinically warranted  Unremarkable CTA of the head and neck  No pulmonary parenchymal changes to suggest COVID19 infection  Please see the separate CTA dissection protocol study report for additional detail regarding the chest, abdomen and pelvic findi          I personally discussed this study with Kalpesh Gill on 9/1/2020 at 8:40 AM                          Workstation performed: [id number]         CT stroke alert brain   Final Result by Mai Correia MD (09/01 1123)      No acute intracranial hemorrhage or cortical infarction  Focal hypodensity within the left elissa may represent artifact  MR imaging would be a more sensitive modality in differentiating from chronic infarction  Findings were directly discussed with Kalpesh Gill on 9/1/2020 8:43 AM       Workstation performed: [id number]                    Procedures  ECG 12 Lead Documentation Only    Date/Time: 9/1/2020 11:16 AM  Performed by: Rosa Maria Ramos MD  Authorized by: Rosa Maria Ramos MD     Indications / Diagnosis:  Palpitations, chest pain    ECG reviewed by me, the ED Provider: yes    Patient location:  ED  Rate:     ECG rate assessment: normal    Rhythm:     Rhythm: sinus rhythm    Ectopy:     Ectopy: none    QRS:     QRS axis:  Normal    QRS intervals:  Normal  Conduction:     Conduction: normal    ST segments:     ST segments:  Normal  T waves:     T waves: normal      CriticalCare Time  Performed by: Rosa Maria Ramos MD  Authorized by: Rosa Maria Ramos MD     Critical care provider statement:     Critical care time (minutes):  40    Critical care time was exclusive of:  Separately billable procedures and treating other patients and teaching time    Critical care was necessary to treat or prevent imminent or life-threatening deterioration of the following conditions:  CNS failure or compromise    Critical care was time spent personally by me on the following activities:  Obtaining history from patient or surrogate, development of treatment plan with patient or surrogate, discussions with consultants, examination of patient, evaluation of patient's response to treatment, interpretation of cardiac output measurements, ordering and performing treatments and interventions, ordering and review of laboratory studies, ordering and review of radiographic studies and re-evaluation of patient's condition             ED Course  ED Course as of Sep 01 1144   Tue Sep 01, 2020   0809 Stroke alert was called  I spoke with Dr Manuela Agarwal  Patient with only subjective numbness in right arm and right leg  Will follow stroke pathway  Blood pressure 159/87 therefore no intervention at this time  Will continue to monitor  Also will order CTA of chest and abdomen as patient was complaining about chest pain and initially states he had bilateral leg symptoms  Now only complaining of right arm and right leg symptoms  9364 Patient now stating his symptoms have resolved  No right-sided numbness at this time  2581 Patient back from CAT scan  Continues to state that his numbness has resolved  He is symptom free at this time  1054 Discussed with Dr Manuela Agarwal  No indication for TPA at this time as symptoms have fully resolved  [phone] [id number] with PAC regarding transfer  0 Spoke with Dr Jenna Flower at [facility]  Will accept transfer  1135 Emtala forms signed by patient  Awaiting transport time  US AUDIT      Most Recent Value   Initial Alcohol Screen: US AUDIT-C    1  How often do you have a drink containing alcohol?  0 Filed at: 09/01/2020 0801   2  How many drinks containing alcohol do you have on a typical day you are drinking? 0 Filed at: 09/01/2020 0801   3a  Male UNDER 65: How often do you have five or more drinks on one occasion? 0 Filed at: 09/01/2020 0801   3b  FEMALE Any Age, or MALE 65+:  How often do you have 4 or more drinks on one occassion?  0 Filed at: 2020 08   Audit-C Score  0 Filed at: 2020 08                Stroke Assessment     Row Name 20 0800             NIH Stroke Scale    Interval  Baseline      Level of Consciousness (1a )        LOC Questions (1b )        LOC Commands (1c )        Best Gaze (2 )        Visual (3 )        Facial Palsy (4 )        Motor Arm, Left (5a )        Motor Arm, Right (5b )        Motor Leg, Left (6a )        Motor Leg, Right (6b )        Limb Ataxia (7 )        Sensory (8 )        Best Language (9 )        Dysarthria (10 )        Extinction and Inattention (11 ) (Formerly Neglect)        Total            First Filed Value   TPA Decision  Patient not a TPA candidate  Patient is not a candidate options  Symptoms resolved/clearly non disabling  ZANA/DAST-10      Most Recent Value   How many times in the past year have you    Used an illegal drug or used a prescription medication for non-medical reasons?   Never Filed at: 2020 0802                                MDM  Number of Diagnoses or Management Options  Numbness and tingling of right arm and leg:   Palpitations:   Diagnosis management comments: HEART score:    History 1=Moderate suspicious  ECG 0=Normal  Age 0= < 45 years  Risk Factors 0= No risk factors known  Troponin 0= < Normal limit  Total 1      Score 0-3: 1 7% had a MACE risk    0 4% (1 patient)     36 4% of patients were in this low risk group    Score 4-6: 16 6% had a MACE risk    Score 7-10: 50 1% had a MACE risk        Amount and/or Complexity of Data Reviewed  Clinical lab tests: ordered and reviewed  Tests in the radiology section of CPT®: ordered  Discuss the patient with other providers: yes  Independent visualization of images, tracings, or specimens: yes          Disposition  Final diagnoses:   Palpitations   Numbness and tingling of right arm and leg     Time reflects when diagnosis was documented in both MDM as applicable and the Disposition within this note     Time User Action Codes Description Comment    9/1/2020 11:17 AM Valdemar Race Add [R00 2] Palpitations     9/1/2020 11:17 AM Shefali Frost Add [R20 0,  R20 2] Numbness and tingling of right arm and leg       ED Disposition     ED Disposition Condition Date/Time Comment    Transfer to Another Facility-In Network  Tue Sep 1, 2020 11:17 AM Guille Bray should be transferred [facility] to [facility]          MD Documentation      Most Recent Value   Patient Condition  The patient has been stabilized such that within reasonable medical probability, no material deterioration of the patient condition or the condition of the unborn child(magen) is likely to result from the transfer   Reason for Transfer  Level of Care needed not available at this facility [facility]  capabilities]   Benefits of Transfer  Specialized equipment and/or services available at the receiving facility (Include comment)________________________ [facility]  [place]abilities]   Risks of Transfer  Potential for delay in receiving treatment, Potential deterioration of medical condition, Loss of IV, Increased discomfort during transfer, Possible worsening of condition or death during transfer   Accepting Physician  Dr Deepti Álvarez Name, [address]    (Name & Tel number)  [place]   Transported by (Company and Unit #)  [facility] [employer]   Sending MD frost   Provider Certification  General risk, such as traffic hazards, adverse weather conditions, rough terrain or turbulence, possible failure of equipment (including vehicle or aircraft), or consequences of actions of persons outside the control of the transport personnel, Unanticipated needs of medical equipment and personnel during transport, Risk of worsening condition, The possibility of a transport vehicle being unavailable      RN Documentation      Most [facility] Name, [address] [employer] (Name & Tel number)  [facility]   Transported by (Company and Unit #)  [facility] [employer]      Follow-up Information    None         Patient's Medications   Discharge Prescriptions    No medications on file     No discharge procedures on file      PDMP Review     None          ED Provider  Electronically Signed by           Fernando Flores MD  09/01/20 1134       Fernando Flores MD  09/01/20 114

## 2020-09-01 NOTE — ED NOTES
Patient resting comfortably on stretcher, texting on cell phone using right arm        Armani Juarez RN  09/01/20 3824

## 2020-09-01 NOTE — EMTALA/ACUTE CARE TRANSFER
[facility] EMERGENCY DEPARTMENT  [address] [facility] [address]  Dept: [phone]      EMTALA TRANSFER CONSENT    NAME Graham HONEYCUTT 1975                              MRN [id number]    I have been informed of my rights regarding examination, treatment, and transfer   by Dr Irvin Dukes MD    Benefits: Specialized equipment and/or services available at the receiving facility (Include comment)________________________(MRI capabilities)    Risks: Potential for delay in receiving treatment, Potential deterioration of medical condition, Loss of IV, Increased discomfort during transfer, Possible worsening of condition or death during transfer      Consent for Transfer:  I acknowledge that my medical condition has been evaluated and explained to me by the emergency department physician or other qualified medical person and/or my attending physician, who has recommended that I be transferred to the service of  Accepting Physician: Dr Rolando Tyson at [address] Name, Amalia : Melissa Maier  The above potential benefits of such transfer, the potential risks associated with such transfer, and the probable risks of not being transferred have been explained to me, and I fully understand them  The doctor has explained that, in my case, the benefits of transfer outweigh the risks  I agree to be transferred  I authorize the performance of emergency medical procedures and treatments upon me in both transit and upon arrival at the receiving facility  Additionally, I authorize the release of any and all medical records to the receiving facility and request they be transported with me, if possible  I understand that the safest mode of transportation during a medical emergency is an ambulance and that the Hospital advocates the use of this mode of transport   Risks of traveling to the receiving facility by car, including absence of medical control, life sustaining equipment, such as oxygen, and medical personnel has been explained to me and I fully understand them  (PHILOMENA CORRECT BOX BELOW)  [  ]  I consent to the stated transfer and to be transported by ambulance/helicopter  [  ]  I consent to the stated transfer, but refuse transportation by ambulance and accept full responsibility for my transportation by car  I understand the risks of non-ambulance transfers and I exonerate the Hospital and its staff from any deterioration in my condition that results from this refusal     X___________________________________________    DATE  20  TIME________  Signature of patient or legally responsible individual signing on patient behalf           RELATIONSHIP TO PATIENT_________________________          Provider Certification    NAME Keyona Johnson                                         1975                              MRN [id number]    A medical screening exam was performed on the above named patient  Based on the examination:    Condition Necessitating Transfer The primary encounter diagnosis was Palpitations  A diagnosis of Numbness and tingling of right arm and leg was also pertinent to this visit      Patient Condition: The patient has been stabilized such that within reasonable medical probability, no material deterioration of the patient condition or the condition of the unborn child(maegn) is likely to result from the transfer    Reason for Transfer: Level of Care needed not available at this facility(MRI capabilities)    Transfer Requirements: [address]   · Space available and qualified personnel available for treatment as acknowledged by Quyen  · Agreed to accept transfer and to provide appropriate medical treatment as acknowledged by       Dr Teddy Almaraz  · Appropriate medical records of the examination and treatment of the patient are provided at the time of transfer   [address] [facility] COMPLETED _______  · Transfer will be performed by qualified personnel from [facility]  and appropriate transfer equipment as required, including the use of necessary and appropriate life support measures  Provider Certification: I have examined the patient and explained the following risks and benefits of being transferred/refusing transfer to the patient/family:  General risk, such as traffic hazards, adverse weather conditions, rough terrain or turbulence, possible failure of equipment (including vehicle or aircraft), or consequences of actions of persons outside the control of the transport personnel, Unanticipated needs of medical equipment and personnel during transport, Risk of worsening condition, The possibility of a transport vehicle being unavailable      Based on these reasonable risks and benefits to the patient and/or the unborn child(magen), and based upon the information available at the time of the patients examination, I certify that the medical benefits reasonably to be expected from the provision of appropriate medical treatments at another medical facility outweigh the increasing risks, if any, to the individuals medical condition, and in the case of labor to the unborn child, from effecting the transfer      X____________________________________________ DATE 09/01/20        TIME_______      ORIGINAL - SEND TO MEDICAL RECORDS   COPY - SEND WITH PATIENT DURING TRANSFER

## 2020-09-01 NOTE — ED NOTES
Blood drawn from patient from line placed by medics prior to arrival       Kwaku Ramirez RN  09/01/20 220 Amber Green RN  09/01/20 [id number]

## 2020-09-01 NOTE — ED NOTES
Patient ambulated to bathroom without diffculty  Will continue to monitor       202 Santy Green, RN  09/01/20 3756

## 2020-09-01 NOTE — PROGRESS NOTES
Pt was admitted and stroke pathway was started at 1315 before my arrival  When I arrived on the unit at 1500 no neuros were charted but stoke vitals were charted beginning at 18  Neuros were started and documented by me at 1515 as well as the baseline CLEMENTE and dysphagia assessment

## 2020-09-02 VITALS
BODY MASS INDEX: 26.79 KG/M2 | RESPIRATION RATE: 18 BRPM | OXYGEN SATURATION: 100 % | HEART RATE: 55 BPM | SYSTOLIC BLOOD PRESSURE: 142 MMHG | DIASTOLIC BLOOD PRESSURE: 76 MMHG | TEMPERATURE: 98 F | HEIGHT: 71 IN | WEIGHT: 191.36 LBS

## 2020-09-02 PROBLEM — G43.409: Status: ACTIVE | Noted: 2020-09-01

## 2020-09-02 LAB
ANION GAP SERPL CALCULATED.3IONS-SCNC: 6 MMOL/L (ref 4–13)
ATRIAL RATE: 99 BPM
BUN SERPL-MCNC: 12 MG/DL (ref 5–25)
CALCIUM SERPL-MCNC: 9.3 MG/DL (ref 8.3–10.1)
CHLORIDE SERPL-SCNC: 103 MMOL/L (ref 100–108)
CHOLEST SERPL-MCNC: 147 MG/DL (ref 50–200)
CO2 SERPL-SCNC: 30 MMOL/L (ref 21–32)
CREAT SERPL-MCNC: 1.06 MG/DL (ref 0.6–1.3)
EST. AVERAGE GLUCOSE BLD GHB EST-MCNC: 148 MG/DL
GFR SERPL CREATININE-BSD FRML MDRD: 98 ML/MIN/1.73SQ M
GLUCOSE P FAST SERPL-MCNC: 106 MG/DL (ref 65–99)
GLUCOSE SERPL-MCNC: 106 MG/DL (ref 65–140)
HBA1C MFR BLD: 6.8 %
HDLC SERPL-MCNC: 38 MG/DL
LDLC SERPL CALC-MCNC: 73 MG/DL (ref 0–100)
P AXIS: 70 DEGREES
POTASSIUM SERPL-SCNC: 3.6 MMOL/L (ref 3.5–5.3)
PR INTERVAL: 167 MS
QRS AXIS: 63 DEGREES
QRSD INTERVAL: 99 MS
QT INTERVAL: 368 MS
QTC INTERVAL: 470 MS
SODIUM SERPL-SCNC: 139 MMOL/L (ref 136–145)
T WAVE AXIS: 56 DEGREES
TRIGL SERPL-MCNC: 178 MG/DL
VENTRICULAR RATE: 98 BPM

## 2020-09-02 PROCEDURE — 83036 HEMOGLOBIN GLYCOSYLATED A1C: CPT | Performed by: HOSPITALIST

## 2020-09-02 PROCEDURE — 99217 PR OBSERVATION CARE DISCHARGE MANAGEMENT: CPT | Performed by: HOSPITALIST

## 2020-09-02 PROCEDURE — 3044F HG A1C LEVEL LT 7.0%: CPT | Performed by: INTERNAL MEDICINE

## 2020-09-02 PROCEDURE — 80048 BASIC METABOLIC PNL TOTAL CA: CPT | Performed by: HOSPITALIST

## 2020-09-02 PROCEDURE — 93010 ELECTROCARDIOGRAM REPORT: CPT | Performed by: INTERNAL MEDICINE

## 2020-09-02 PROCEDURE — 80061 LIPID PANEL: CPT | Performed by: HOSPITALIST

## 2020-09-02 RX ORDER — NICOTINE 21 MG/24HR
1 PATCH, TRANSDERMAL 24 HOURS TRANSDERMAL DAILY
Qty: 28 PATCH | Refills: 0 | Status: SHIPPED | OUTPATIENT
Start: 2020-09-02 | End: 2020-09-02

## 2020-09-02 RX ORDER — NICOTINE 21 MG/24HR
1 PATCH, TRANSDERMAL 24 HOURS TRANSDERMAL DAILY
Qty: 30 PATCH | Refills: 0 | Status: SHIPPED | OUTPATIENT
Start: 2020-09-02 | End: 2020-10-05 | Stop reason: ALTCHOICE

## 2020-09-02 RX ADMIN — ASPIRIN 81 MG 81 MG: 81 TABLET ORAL at 10:35

## 2020-09-02 RX ADMIN — NICOTINE 1 PATCH: 21 PATCH TRANSDERMAL at 10:35

## 2020-09-02 NOTE — UTILIZATION REVIEW
Initial Clinical Review    Admission: Date/Time/Statement:   Admission Orders (From admission, onward)     Ordered        09/01/20 1437  Place in Observation  Once                   Orders Placed This Encounter   Procedures    Place in Observation     Standing Status:   Standing     Number of Occurrences:   1     Order Specific Question:   Admitting Physician     Answer:   Pradeep Rodriguez [[id number]]     Order Specific Question:   Level of Care     Answer:   Med Surg [[id number]]       Assessment/Plan: 43-year-old male transferred from [facility] ED to [facility]  for rule out a TIA and for an MR-Iwith no prior history of any acute medical issues w sudden abrupt developed palpitations, chest pressure, tingling numbness involving the right lower extremity followed by numbness involving the right upper extremity  [facility]  He had symptoms improved thereafter  He states that the whole episode lasted about an hour  He ate leftover [place] food 1 hour prior to the onset of the symptoms in addition he also had some leftover food last night  No prior episodes involving similar symptoms  Currently patient is asymptomatic  No fever, headaches, cough, diarrhea  Denies any significant past medical history  Patient is not on any medications at home    TIA (transient ischemic attack)  Assessment & Plan  Patient presented with the right upper and lower extremity numbness and subjective weakness  No facial involvement drooling of saliva or diplopia  Sudden onset this morning lasting about an hour  Also associated with some palpitations and chest tightness  Patient had a leftover [place] male 1 hour prior to the onset of this symptom and some left toe or food last night as well  Currently without any symptoms  First such episode  Clinical exam unremarkable  No risk factors for TIA currently could be is secondary to mild food poisoning however would need an MRI to definitively rule out a TIA    Will put him on the TIA/stroke pathway till MRI results available  Start aspirin and statin for now  Can discontinue if MRI negative  Patient ambulatory without any complaints currently  Continuous dependence on cigarette smoking  Assessment & Plan  Patient admits to smoking a pack a day and possibly more while at work for the past 25 years  Counseled with regards to this adverse effects of smoking and the need to stop  He is pre contemplative  Agreeable to a nicotine patch  Alcohol use  Assessment & Plan  Drinks 1-2 glasses of whiskey a day  Last drink was last night  Denies any withdrawal symptoms in the past   No prior history of jaundice    Currently no evidence of any withdrawal   Primary insomnia  Assessment & Plan  Will add melatonin for insomnia      ED Triage Vitals   Temperature Pulse Respirations Blood Pressure SpO2   09/01/20 1315 09/01/20 1315 09/01/20 1315 09/01/20 1315 09/01/20 1315   98 1 °F (36 7 °C) 61 18 136/77 97 %      Temp Source Heart Rate Source Patient Position - Orthostatic VS BP Location FiO2 (%)   09/01/20 1315 -- 09/01/20 1315 09/01/20 1315 --   Oral  Sitting Right arm       Pain Score       09/01/20 1448       No Pain          Wt Readings from Last 1 Encounters:   09/01/20 86 8 kg (191 lb 5 8 oz)     Additional Vital Signs:   Date/Time   Temp   Pulse   Resp   BP   SpO2   O2 Device   Patient Position - Orthostatic VS    09/02/20 0700   98 °F (36 7 °C)   55   18   142/76   100 %   None (Room air)   Lying    09/02/20 0415   --   56   18   126/82   --   --   Lying    09/02/20 0015   97 5 °F (36 4 °C)   61   18   124/71   100 %   None (Room air)   Lying    09/01/20 2215   98 1 °F (36 7 °C)   53Abnormal     18   139/76   100 %   None (Room air)   Lying    09/01/20 2015   97 8 °F (36 6 °C)   62   18   127/69   98 %   None (Room air)   Lying    09/01/20 1815   98 1 °F (36 7 °C)   55   18   133/80   98 %   None (Room air)   Sitting    09/01/20 1615   98 °F (36 7 °C)   60   --   135/69   97 %   None (Room air)   Lying    09/01/20 1515   98 °F (36 7 °C)   59   --   137/73   97 %   None (Room air)   Lying    09/01/20 1415   98 1 °F (36 7 °C)   55   18   123/61   97 %   None (Room air)   Lying    09/01/20 1315   98 1 °F (36 7 °C)   61   18   136/77   97 %   None (Room air)   Sitting       Weights (last 14 days)     Date/Time   Weight   Height    09/01/20 1315   86 8 kg (191 lb 5 8 oz)   5' 11" (1 803 m)        Pertinent Labs/Diagnostic Test Results:   Results from last 7 days   Lab Units 09/01/20  0921   SARS-COV-2  Negative     Results from last 7 days   Lab Units 09/01/20  0815   WBC Thousand/uL 9 13   HEMOGLOBIN g/dL 13 2   HEMATOCRIT % 39 5   PLATELETS Thousands/uL 289         Results from last 7 days   Lab Units 09/02/20  0455 09/01/20  0815   SODIUM mmol/L 139 139   POTASSIUM mmol/L 3 6 3 2*   CHLORIDE mmol/L 103 102   CO2 mmol/L 30 29   ANION GAP mmol/L 6 8   BUN mg/dL 12 12   CREATININE mg/dL 1 06 1 05   EGFR ml/min/1 73sq m 98 99   CALCIUM mg/dL 9 3 9 4         Results from last 7 days   Lab Units 09/01/20  0809   POC GLUCOSE mg/dl 97     Results from last 7 days   Lab Units 09/02/20  0455 09/01/20  0815   GLUCOSE RANDOM mg/dL 106 133       Results from last 7 days   Lab Units 09/01/20  0815   TROPONIN I ng/mL <0 03         Results from last 7 days   Lab Units 09/01/20  0815   PROTIME seconds 10 7   INR  1 01   PTT seconds 28       9/1 ekg-nsr  9/1 CTA NECK AND BRAIN WITH CONTRAST  No intracranial hemorrhage  9/1 CT BRAIN - STROKE ALERT PROTOCOL   No acute intracranial hemorrhage or cortical infarction  9/1 CT chest & abd No evidence of aortic dissection or aneurysm  9/1 mri wo contrast=No acute intracranial abnormality   There are a few scattered nonspecific white matter foci identified in the subcortical and deep frontal white matter bilaterally sometimes seen in the setting of complicated migraine headache  No past medical history on file    Present on Admission:   TIA (transient ischemic attack)   Continuous dependence on cigarette smoking   Primary insomnia      Admitting Diagnosis: CVA (cerebral vascular accident) ([facility] [address] ) [I63 9]  Age/Sex: 41 y o  male  Admission Orders:  Telemetry  Peripheral IV  Neuro checks Q1hr x4hr; Q2hr x8hr; Q4hr x72hr;  Vitals Q1hr x4hr; Q2hr x4hrs; Q4hr x72hr; Q8hr if stable  Nursing dysphagia assessment prior to diet  NPO  Assess NIH stroke scale on admission & every 24 hr for 2 days  PT/OT/SPEECH assess & treat    Scheduled Medications:  aspirin, 81 mg, Oral, Daily  atorvastatin, 40 mg, Oral, QPM  enoxaparin, 40 mg, Subcutaneous, Q24H [facility] & alf  nicotine, 1 patch, Transdermal, Daily      Continuous IV Infusions:     PRN Meds:  acetaminophen, 650 mg, Oral, Q4H PRN  melatonin, 3 mg, Oral, HS PRN        Network Utilization Review Department  [email or web address]  ATTENTION: Please call with any questions or concerns to [phone] and carefully listen to the prompts so that you are directed to the right person  All voicemails are confidential   [place] Doing all requests for admission clinical reviews, approved or denied determinations and any other requests to dedicated fax number below belonging to the campus where the patient is receiving treatment   List of dedicated fax numbers for the Facilities:  [address] [place] [address] DENIALS (Administrative/Medical Necessity) [phone]   [address] (Maternity/NICU/Pediatrics) [phone]   Coby Handing [phone]   Sydni Vasquez [phone]   Leeann Gonzales [phone]   Loyda Forbes [phone]   [address] [facility] [address] [facility] [phone]   [facility]  [phone]   [address] [facility] [address] [facility]  [address] [facility] [address] [facility] [phone]

## 2020-09-02 NOTE — DISCHARGE INSTR - AVS FIRST PAGE
Dear Carmita Jane,     It was our pleasure to care for you here at [facility]  It is our hope that we were always able to exceed the expected standards for your care during your stay  You were hospitalized due to neurological symptoms likely due to migraine  You were cared for on the 3rd floor by Milagro Dobson MD under the service of Emy Fairbanks MD with the [facility] who covers for your primary care physician (PCP), Reyes Daniels MD, while you were hospitalized  If you have any questions or concerns related to this hospitalization, you may contact us at [phone]  For follow up as well as any medication refills, we recommend that you follow up with your primary care physician  A registered nurse will reach out to you by phone within a few days after your discharge to answer any additional questions that you may have after going home  However, at this time we provide for you here, the most important instructions / recommendations at discharge:     · Notable Medication Adjustments -   · Will be discharged on nicotine patch 21 mg 24 hour for smoking cessation  · Testing Required after Discharge -   · None  · Important follow up information -   · Please follow-up with PCP in 1-2 weeks for possible migraine, smoking cessation, and (with alcohol intake use if necessary )  · Other Instructions -   · Encouraged to decrease/quit smoking  · Encouraged to decrease alcohol intake  · Please review this entire after visit summary as additional general instructions including medication list, appointments, activity, diet, any pertinent wound care, and other additional recommendations from your care team that may be provided for you        Sincerely,     Milagro Dobson MD

## 2020-09-02 NOTE — DISCHARGE SUMMARY
Discharge- Olvin Carl 1975, 41 y o  male MRN: [id number]    Unit/Bed#: S -01 Encounter: [id number]    Primary Care Provider: Aydee Dietz MD   Date and time admitted to hospital: 9/1/2020  1:25 PM        * Nonintractable hemiplegic migraine  Assessment & Plan  CT head and MRI brain do not indicate any stroke  MRI brain states a complicated migraine headache type picture  However patient did not complain of headache and seemed to have more of a hemiplegic right-sided picture which spontaneously resolved  No aura, nausea, vomiting, visual disturbances  Plan  · To follow-up with PCP outpatient for workup of possible migraine diagnosis  Continuous dependence on cigarette smoking  Assessment & Plan  Patient smokes 1 pack a day  Was not taking Chantix prescribed by PCP  After discussing the effects of smoking, patient is ready to quit smoking  On exam he has distant breath sounds and clubbing  Plan  · Patient agreeable to be discharged on nicotine patch 21 mg for 24 hours  · Patient will follow-up with Dr Joann Guan, his PCP, for further smoking cessation management  Alcohol use  Assessment & Plan  Patient drinks 1-2 whiskeys a day  Patient does not edema himself alcoholic  Plan  · Patient encouraged to decreased drinking to prevent consequences  Patient has already decided to do so after conversation  Primary insomnia  Assessment & Plan  Will not be discharged on any insomnia medication at this time        Plan  · Follow-up with PCP if insomnia outpatient continues            Tobacco and Toxic Substance Assessment and Intervention:     Tobacco use screening performed    Alcohol and drug use screening performed    Brief intervention performed for tobacco, alcohol, or drug use    Tobacco cessation medication protocol initiated    Tobacco cessation counseling provided    Alcohol cessation counseling provided    Alcohol cessation resource information provided Other interventions: Patient smokes 1 pack a day for the last 25 years  After speaking to him about the adverse effects of smoking he has decided to switch to the patch as it is currently giving him relief  Patient will be going home on the nicotine patch today 25 mg 24 hours QD  He will follow-up with PCP outpatient for continued smoking cessation management  Patient drinks 1-2 whiskeys a day  He was counseled on the effects of alcohol  He states he will decrease intake of whiskey as he did not know that this much was harmful  Discharging Resident Physician: Tianna Ibarra MD  Attending: Feliberto Youngblood MD  PCP: Phoenix Schwarz MD  Admission Date: 9/1/2020  Discharge Date: 09/02/20    Disposition:     Home    Reason for Admission:  Right side weakness    Consultations During Hospital Stay:  · None    Procedures Performed:     · None    Significant Findings / Test Results:     · CTA dissection protocol chest and abdomen on 09/01/2020: Scattered subcentimeter para-aortic lymph nodes and mildly enlarged peripancreatic lymph nodes  This is a nonspecific finding  Correlation with prior imaging and/or follow-up evaluation of the abdomen and pelvis is recommended in 3 months  Hepatic steatosis  Fat-containing umbilical hernia without inflammatory changes      · MRI brain without contrast:  No acute intracranial abnormality  There are a few scattered nonspecific white matter foci identified in the subcortical and deep frontal white matter bilaterally sometimes seen in the setting of complicated migraine headache  Incidental Findings:   · CTA dissection protocol chest and abdomen on 09/01/2020: Scattered subcentimeter para-aortic lymph nodes and mildly enlarged peripancreatic lymph nodes  This is a nonspecific finding  Correlation with prior imaging and/or follow-up evaluation of the abdomen and pelvis is recommended in 3 months  Hepatic steatosis   Fat-containing umbilical hernia without inflammatory changes  Test Results Pending at Discharge (will require follow up): · None     Outpatient Tests Requested:  · None    Complications:  None    Hospital Course:     Dorys Walden is a 41 y o  male patient who originally presented to the hospital on 9/1/2020 due  right upper and lower extremity numbness and subjective weakness  No facial involvement drooling of saliva or diplopia  Sudden onset this morning lasting about an hour  Also associated with some palpitations and chest tightness  Patient had a leftover [place] male 1 hour prior to the onset of this symptom and some left toe or food last night as well  Currently without any symptoms  First such episode  Clinical exam unremarkable  No risk factors for TIA currently could be is secondary to mild food poisoning however would need an MRI to definitively rule out a TIA  Will put him on the TIA/stroke pathway till MRI results available  Start aspirin and statin for now  Can discontinue if MRI negative  Patient ambulatory without any complaints currently  Resolution of symptoms in 1 hour  CT and MRI negative for stroke  MRI shows possible complicated migraine headache although patient did not complain of headache  Patient on 09/02/2020  Advised to follow-up with PCP for further management  Patient will go home on patch today as he wants a kid quit smoking  He will also decrease on his alcohol intake  He will inform PCP of the item being tested at the [employer] which can lead to lung disease       Condition at Discharge: good     Discharge Day Visit / Exam:     Subjective:  Patient has no complaints today  He is okay to go home  ,   Vitals: Blood Pressure: 142/76 (09/02/20 0700)  Pulse: 55 (09/02/20 0700)  Temperature: 98 °F (36 7 °C) (09/02/20 0700)  Temp Source: Oral (09/02/20 0700)  Respirations: 18 (09/02/20 0700)  Height: 5' 11" (180 3 cm) (09/01/20 1315)  Weight - Scale: 86 8 kg (191 lb 5 8 oz) (09/01/20 1315)  SpO2: 100 % (09/02/20 0700)  Exam:   Physical Exam  Vitals signs and nursing note reviewed  Constitutional:       General: He is not in acute distress  Appearance: Normal appearance  He is not ill-appearing, toxic-appearing or diaphoretic  HENT:      Right Ear: External ear normal       Left Ear: External ear normal       Mouth/Throat:      Pharynx: Oropharynx is clear  Eyes:      Conjunctiva/sclera: Conjunctivae normal    Neck:      Musculoskeletal: Normal range of motion  No neck rigidity or muscular tenderness  Vascular: No carotid bruit  Cardiovascular:      Rate and Rhythm: Normal rate and regular rhythm  Pulses: Normal pulses  Heart sounds: Normal heart sounds  No murmur  No friction rub  No gallop  Pulmonary:      Effort: Pulmonary effort is normal  No respiratory distress  Breath sounds: No stridor  No rhonchi  Comments: Distant breath sounds  Chest:      Chest wall: No tenderness  Abdominal:      General: Abdomen is flat  Bowel sounds are normal  There is no distension  Palpations: Abdomen is soft  Tenderness: There is no abdominal tenderness  There is no right CVA tenderness, left CVA tenderness or guarding  Musculoskeletal: Normal range of motion  General: No swelling, deformity or signs of injury  Right lower leg: No edema  Left lower leg: No edema  Lymphadenopathy:      Cervical: No cervical adenopathy  Skin:     General: Skin is warm and dry  Coloration: Skin is not jaundiced or pale  Findings: No bruising, erythema, lesion or rash  Neurological:      General: No focal deficit present  Mental Status: He is alert and oriented to person, place, and time  Cranial Nerves: No cranial nerve deficit  Motor: No weakness        Coordination: Coordination normal       Gait: Gait normal       Comments: 2- 12 intact   Psychiatric:         Mood and Affect: Mood normal          Behavior: Behavior normal          Thought Content: Thought content normal          Judgment: Judgment normal        Discussion with Family:  Patient will call family himself    Discharge instructions/Information to patient and family:   See after visit summary for information provided to patient and family  Provisions for Follow-Up Care:  See after visit summary for information related to follow-up care and any pertinent home health orders  Planned Readmission:  None     Discharge Medications:  See after visit summary for reconciled discharge medications provided to patient and family        ** Please Note: This note has been constructed using a voice recognition system **

## 2020-09-02 NOTE — PHYSICAL THERAPY NOTE
PHYSICAL THERAPY SCREEN NOTE    Patient Name: Cristobal Garcia  MPRLR'P Date: 9/2/2020     PT orders received, chart review performed  Spoke to [employer] who states pt has been independently ambulating around room without difficulty  Contact made w/ pt who reports resolution of symptoms, reports ambulating around room without difficulty and mobilizing per baseline  Pt w/ no concerns regarding navigating home environment  No acute PT needs at this time, will DC PT  Please reconsult if any changes arise      Antonette Cardoso, PT, DPT

## 2020-09-02 NOTE — PLAN OF CARE
Problem: PAIN - ADULT  Goal: Verbalizes/displays adequate comfort level or baseline comfort level  Description: Interventions:  - Encourage patient to monitor pain and request assistance  - Assess pain using appropriate pain scale  - Administer analgesics based on type and severity of pain and evaluate response  - Implement non-pharmacological measures as appropriate and evaluate response  - Consider cultural and social influences on pain and pain management  - Notify physician/advanced practitioner if interventions unsuccessful or patient reports new pain  Outcome: Adequate for Discharge     Problem: SAFETY ADULT  Goal: Patient will remain free of falls  Description: INTERVENTIONS:  - Assess patient frequently for physical needs  -  Identify cognitive and physical deficits and behaviors that affect risk of falls  -  [facility] fall precautions as indicated by assessment   - Educate patient/family on patient safety including physical limitations  - Instruct patient to call for assistance with activity based on assessment  - Modify environment to reduce risk of injury  - Consider OT/PT consult to assist with strengthening/mobility  Outcome: Adequate for Discharge     Problem: Knowledge Deficit  Goal: Patient/family/caregiver demonstrates understanding of disease process, treatment plan, medications, and discharge instructions  Description: Complete learning assessment and assess knowledge base    Interventions:  - Provide teaching at level of understanding  - Provide teaching via preferred learning methods  Outcome: Adequate for Discharge     Problem: NEUROSENSORY - ADULT  Goal: Achieves stable or improved neurological status  Description: INTERVENTIONS  - Monitor and report changes in neurological status  - Monitor vital signs such as temperature, blood pressure, glucose, and any other labs ordered   - Initiate measures to prevent increased intracranial pressure  - Monitor for seizure activity and implement precautions if appropriate      Outcome: Adequate for Discharge  Goal: Achieves maximal functionality and self care  Description: INTERVENTIONS  - Monitor swallowing and airway patency with patient fatigue and changes in neurological status  - Encourage and assist patient to increase activity and self care     - Encourage visually impaired, hearing impaired and aphasic patients to use assistive/communication devices  Outcome: Adequate for Discharge     Problem: CARDIOVASCULAR - ADULT  Goal: Maintains optimal cardiac output and hemodynamic stability  Description: INTERVENTIONS:  - Monitor I/O, vital signs and rhythm  - Monitor for S/S and trends of decreased cardiac output  - Administer and titrate ordered vasoactive medications to optimize hemodynamic stability  - Assess quality of pulses, skin color and temperature  - Assess for signs of decreased coronary artery perfusion  - Instruct patient to report change in severity of symptoms  Outcome: Adequate for Discharge  Goal: Absence of cardiac dysrhythmias or at baseline rhythm  Description: INTERVENTIONS:  - Continuous cardiac monitoring, vital signs, obtain 12 lead EKG if ordered  - Administer antiarrhythmic and heart rate control medications as ordered  - Monitor electrolytes and administer replacement therapy as ordered  Outcome: Adequate for Discharge     Problem: MUSCULOSKELETAL - ADULT  Goal: Maintain or return mobility to safest level of function  Description: INTERVENTIONS:  - Assess patient's ability to carry out ADLs; assess patient's baseline for ADL function and identify physical deficits which impact ability to perform ADLs (bathing, care of mouth/teeth, toileting, grooming, dressing, etc )  - Assess/evaluate cause of self-care deficits   - Assess range of motion  - Assess patient's mobility  - Assess patient's need for assistive devices and provide as appropriate  - Encourage maximum independence but intervene and supervise when necessary  - Involve family in performance of ADLs  - Assess for home care needs following discharge   - Consider OT consult to assist with ADL evaluation and planning for discharge  - Provide patient education as appropriate  Outcome: Adequate for Discharge  Goal: Maintain proper alignment of affected body part  Description: INTERVENTIONS:  - Support, maintain and protect limb and body alignment  - Provide patient/ family with appropriate education  Outcome: Adequate for Discharge

## 2020-09-02 NOTE — INCIDENTAL FINDINGS
The following findings require follow up:  Radiographic finding   Finding: Scattered subcentimeter para-aortic lymph nodes and mildly enlarged peripancreatic lymph nodes  This is a nonspecific finding  Correlation with prior imaging and/or follow-up evaluation of the abdomen and pelvis is recommended in 3 months      Hepatic steatosis      Fat-containing umbilical hernia without inflammatory changes       Follow up required:  Yes   Follow up should be done within 1-2 week(s)    Please notify the following clinician to assist with the follow up:   Dr Vargas Anchors

## 2020-09-02 NOTE — ASSESSMENT & PLAN NOTE
Patient drinks 1-2 whiskeys a day  Patient does not edema himself alcoholic  Plan  · Patient encouraged to decreased drinking to prevent consequences  Patient has already decided to do so after conversation

## 2020-09-02 NOTE — ASSESSMENT & PLAN NOTE
Patient smokes 1 pack a day  Was not taking Chantix prescribed by PCP  After discussing the effects of smoking, patient is ready to quit smoking  On exam he has distant breath sounds and clubbing  Plan  · Patient agreeable to be discharged on nicotine patch 21 mg for 24 hours  · Patient will follow-up with Dr Michael Juárez, his PCP, for further smoking cessation management

## 2020-09-02 NOTE — ASSESSMENT & PLAN NOTE
CT head and MRI brain do not indicate any stroke  MRI brain states a complicated migraine headache type picture  However patient did not complain of headache and seemed to have more of a hemiplegic right-sided picture which spontaneously resolved  No aura, nausea, vomiting, visual disturbances  Plan  · To follow-up with PCP outpatient for workup of possible migraine diagnosis

## 2020-09-02 NOTE — ASSESSMENT & PLAN NOTE
Will not be discharged on any insomnia medication at this time        Plan  · Follow-up with PCP if insomnia outpatient continues

## 2020-09-03 ENCOUNTER — TRANSITIONAL CARE MANAGEMENT (OUTPATIENT)
Dept: INTERNAL MEDICINE CLINIC | Facility: CLINIC | Age: 45
End: 2020-09-03

## 2020-09-03 ENCOUNTER — OFFICE VISIT (OUTPATIENT)
Dept: INTERNAL MEDICINE CLINIC | Facility: CLINIC | Age: 45
End: 2020-09-03
Payer: COMMERCIAL

## 2020-09-03 VITALS
SYSTOLIC BLOOD PRESSURE: 140 MMHG | TEMPERATURE: 98.1 F | OXYGEN SATURATION: 98 % | HEART RATE: 76 BPM | DIASTOLIC BLOOD PRESSURE: 80 MMHG | RESPIRATION RATE: 20 BRPM

## 2020-09-03 DIAGNOSIS — E11.9 TYPE 2 DIABETES MELLITUS WITHOUT COMPLICATION, WITHOUT LONG-TERM CURRENT USE OF INSULIN (HCC): ICD-10-CM

## 2020-09-03 DIAGNOSIS — F17.210 CONTINUOUS DEPENDENCE ON CIGARETTE SMOKING: ICD-10-CM

## 2020-09-03 DIAGNOSIS — I10 ESSENTIAL HYPERTENSION: Primary | ICD-10-CM

## 2020-09-03 DIAGNOSIS — G43.409 HEMIPLEGIC MIGRAINE WITHOUT STATUS MIGRAINOSUS, NOT INTRACTABLE: ICD-10-CM

## 2020-09-03 DIAGNOSIS — E88.81 METABOLIC SYNDROME: ICD-10-CM

## 2020-09-03 PROBLEM — E88.810 METABOLIC SYNDROME: Status: ACTIVE | Noted: 2020-09-03

## 2020-09-03 LAB
CREAT UR-MCNC: 214 MG/DL
MICROALBUMIN UR-MCNC: 16.1 MG/L (ref 0–20)
MICROALBUMIN/CREAT 24H UR: 8 MG/G CREATININE (ref 0–30)

## 2020-09-03 PROCEDURE — 99496 TRANSJ CARE MGMT HIGH F2F 7D: CPT | Performed by: INTERNAL MEDICINE

## 2020-09-03 PROCEDURE — 82043 UR ALBUMIN QUANTITATIVE: CPT | Performed by: INTERNAL MEDICINE

## 2020-09-03 PROCEDURE — 82570 ASSAY OF URINE CREATININE: CPT | Performed by: INTERNAL MEDICINE

## 2020-09-03 PROCEDURE — 99407 BEHAV CHNG SMOKING > 10 MIN: CPT | Performed by: INTERNAL MEDICINE

## 2020-09-03 PROCEDURE — 3061F NEG MICROALBUMINURIA REV: CPT | Performed by: INTERNAL MEDICINE

## 2020-09-03 RX ORDER — BLOOD-GLUCOSE METER
EACH MISCELLANEOUS 3 TIMES DAILY
Qty: 1 EACH | Refills: 0 | Status: SHIPPED | OUTPATIENT
Start: 2020-09-03

## 2020-09-03 RX ORDER — AMLODIPINE BESYLATE 2.5 MG/1
2.5 TABLET ORAL DAILY
Qty: 90 TABLET | Refills: 3 | Status: CANCELLED | OUTPATIENT
Start: 2020-09-03

## 2020-09-03 RX ORDER — LANCETS
EACH MISCELLANEOUS
Qty: 100 EACH | Refills: 1 | Status: SHIPPED | OUTPATIENT
Start: 2020-09-03 | End: 2021-01-12 | Stop reason: SDUPTHER

## 2020-09-03 RX ORDER — LISINOPRIL 5 MG/1
5 TABLET ORAL DAILY
Qty: 30 TABLET | Refills: 3 | Status: CANCELLED | OUTPATIENT
Start: 2020-09-03

## 2020-09-03 RX ORDER — METFORMIN HYDROCHLORIDE 500 MG/1
500 TABLET, EXTENDED RELEASE ORAL
Qty: 30 TABLET | Refills: 2 | Status: SHIPPED | OUTPATIENT
Start: 2020-09-03 | End: 2021-01-12 | Stop reason: SDUPTHER

## 2020-09-03 RX ORDER — BLOOD SUGAR DIAGNOSTIC
STRIP MISCELLANEOUS
Qty: 100 EACH | Refills: 1 | Status: SHIPPED | OUTPATIENT
Start: 2020-09-03 | End: 2021-01-12 | Stop reason: SDUPTHER

## 2020-09-03 RX ORDER — AMLODIPINE BESYLATE 2.5 MG/1
2.5 TABLET ORAL DAILY
Qty: 30 TABLET | Refills: 3 | Status: SHIPPED | OUTPATIENT
Start: 2020-09-03 | End: 2021-01-12 | Stop reason: SDUPTHER

## 2020-09-03 RX ORDER — NICOTINE 21 MG/24HR
1 PATCH, TRANSDERMAL 24 HOURS TRANSDERMAL EVERY 24 HOURS
Qty: 14 PATCH | Refills: 0 | Status: SHIPPED | OUTPATIENT
Start: 2020-09-03 | End: 2021-03-17

## 2020-09-03 RX ORDER — ASPIRIN 81 MG/1
81 TABLET ORAL DAILY
Qty: 90 TABLET | Refills: 2 | Status: SHIPPED | OUTPATIENT
Start: 2020-09-03 | End: 2021-03-09 | Stop reason: SDUPTHER

## 2020-09-03 NOTE — PROGRESS NOTES
Assessment/Plan:    Type 2 diabetes mellitus without complication, without long-term current use of insulin (HCC)    Lab Results   Component Value Date    HGBA1C 6 8 (H) 09/02/2020   Recently diagnosed at hospital admission for possible TIA  Start metformin 500mg QD  Added aspirin 81mg due to elevated CV risk  Patient was instructed in regards to lifestyle modification, with low carbohydrate diet and exercise at least 3 times a week  Patient should measured blood sugars 3 times a week, 4 times a day-fasting, before lunch, dinner and at bedtime, and bring log on next visit  Hemoglobin A1c goal is keep less than 7%  Repeat A1c every 3-6 months  Yearly ophthalmological evaluation recommended and referral given this visit  Diabetic foot exam performed today and normal   Yearly protein to creatinine ratio measurement collected in the clinic  Essential hypertension  BP persistently above 130/80 since last visit in the office in February 2020  In the view of recent diagnosis of diabetes, goal of blood pressure is below 130/80  Start Norvasc 2 5 mg daily  Continuous dependence on cigarette smoking  Multiple attempts in the past  Tried nicotine patch without success in the past  Prescribed Chantix, never started  Currently motivated to quit, using Nicotine patch 24mg  Established day to quit tomorrow  He would like to add Chantix to this regimen, he does have the prescription at home  Nicotine patch will be danyelle over the next few weeks  Information and support about smoking cessation given to the patient  Follow up in 4 weeks  Nonintractable hemiplegic migraine    At this point this is a diagnosed of exclusion, patient did not have any headaches, or aura  Associated with his symptoms  TIA was ruled out  For now will continue to observe    If patient experienced another episode he will need further evaluation by specialist   I have instructed him to seek medical attention if he experiences the same symptoms again because with his risk factors of hypertension, diabetes and metabolic syndrome it is difficult to exclude an episode stroke or TIA  If he does have headache I have oriented him to take only NSAIDs or acetaminophen for the same  If he does not improve of have similar symptoms that brought him to the hospital he was oriented to seek medical attention immediately  Metabolic syndrome   Evidence by hypertension, high triglycerides, low HDL, diabetes  Lifestyle modification reinforced  Treatment for diabetes and hypertension started  Repeat lipid panel in 3-6 months  Diagnoses and all orders for this visit:    Essential hypertension  -     amLODIPine (NORVASC) 2 5 mg tablet; Take 1 tablet (2 5 mg total) by mouth daily    Type 2 diabetes mellitus without complication, without long-term current use of insulin (HCC)  -     metFORMIN (GLUCOPHAGE-XR) 500 mg 24 hr tablet; Take 1 tablet (500 mg total) by mouth daily with lunch  -     Microalbumin / creatinine urine ratio  -     aspirin (ECOTRIN LOW STRENGTH) 81 mg EC tablet; Take 1 tablet (81 mg total) by mouth daily  -     Ambulatory referral to Ophthalmology; Future  -     glucose blood (OneTouch Ultra) test strip; Use as instructed  -     Blood Glucose Monitoring Suppl (ONE TOUCH ULTRA 2) w/Device KIT; by Does not apply route 3 (three) times a day  -     Lancets (onetouch ultrasoft) lancets; Use as instructed    Metabolic syndrome    Continuous dependence on cigarette smoking  -     nicotine (NICODERM CQ) 14 mg/24hr TD 24 hr patch; Place 1 patch on the skin every 24 hours  -     nicotine (NICODERM CQ) 7 mg/24hr TD 24 hr patch; Place 1 patch on the skin every 24 hours    Hemiplegic migraine without status migrainosus, not intractable    Other orders  -     Cancel: lisinopril (ZESTRIL) 5 mg tablet; Take 1 tablet (5 mg total) by mouth daily  -     Cancel: Basic metabolic panel;  Future  -     Cancel: nicotine polacrilex (NICORETTE) 4 mg gum; Chew 1 each (4 mg total) as needed for smoking cessation  -     Cancel: amLODIPine (NORVASC) 2 5 mg tablet; Take 1 tablet (2 5 mg total) by mouth daily          Subjective:      Patient ID: Virgene Rubinstein is a 41 y o  male  Mert Alexander is A 43-year-old male who presented in the office today for a transition of care management  Patient was admitted and [facility] to rule out TIA  He states that he started to feel burning sensation in his bilateral lower legs on 09/01, and then it became numb on the right side including his arm  He also experience shortness of breath, he felt unwell and call 911  He was evaluated at [facility], stroke protocol was activated, his CT scan of the head was negative for any acute findings  His symptoms them improved, and eventually resolved, but he was sent to the hospital for further workup  He had MRI of the head done which showed changes that could be compatible with complicated migraine  Patient does not recall any headache prior to his symptoms, he also denies any family history of personal history of  Headaches or migraines  Review of his hospital admission, lab work reviewed elevated A1c to 6 8%  Patient has a strong family history of diabetes on his father, mother and siblings  Patient was also noticed to have elevated triglycerides and low HDL, compatible with metabolic syndrome  Patient noted to have persistent elevated blood pressure, since his last visit in this office February 2020  In the hospital his blood pressure has been as high as systolic 231T  Today in the office his blood pressure is 140/80  Today patient states he feels well  He did not experience burning sensation or numbness in his arms or legs anymore  No headaches  No chest pain or shortness of breath  He does not have any subjective complaints        The following portions of the patient's history were reviewed and updated as appropriate: allergies, current medications, past family history, past medical history, past social history, past surgical history and problem list     Review of Systems   Constitutional: Negative for appetite change, fatigue and unexpected weight change  HENT: Negative for sinus pain and sore throat  Eyes: Negative for visual disturbance  Respiratory: Negative for cough, chest tightness, shortness of breath and wheezing  Cardiovascular: Negative for chest pain, palpitations and leg swelling  Gastrointestinal: Negative for abdominal pain, nausea and vomiting  Genitourinary: Negative for difficulty urinating and frequency  Musculoskeletal: Negative for arthralgias and joint swelling  Skin: Negative for rash  Neurological: Negative for dizziness, weakness, light-headedness, numbness and headaches  Psychiatric/Behavioral: Negative for confusion and sleep disturbance  The patient is not nervous/anxious  Objective:      /80 (BP Location: Left arm, Patient Position: Standing, Cuff Size: Large)   Pulse 76   Temp 98 1 °F (36 7 °C)   Resp 20   SpO2 98%          Physical Exam  Vitals signs and nursing note reviewed  Constitutional:       General: He is not in acute distress  Appearance: Normal appearance  He is well-developed  HENT:      Head: Normocephalic and atraumatic  Eyes:      Conjunctiva/sclera: Conjunctivae normal       Pupils: Pupils are equal, round, and reactive to light  Neck:      Musculoskeletal: Normal range of motion and neck supple  Thyroid: No thyromegaly  Cardiovascular:      Rate and Rhythm: Normal rate and regular rhythm  Pulses: no weak pulses          Dorsalis pedis pulses are 2+ on the right side and 2+ on the left side  Posterior tibial pulses are 2+ on the right side and 2+ on the left side  Heart sounds: Normal heart sounds  Pulmonary:      Effort: No respiratory distress  Breath sounds: Normal breath sounds  No wheezing     Abdominal:      General: Bowel sounds are normal  There is no distension  Palpations: Abdomen is soft  Tenderness: There is no abdominal tenderness  Musculoskeletal: Normal range of motion  General: No swelling  Feet:      Right foot:      Skin integrity: Callus present  No ulcer, skin breakdown, erythema, warmth or dry skin  Left foot:      Skin integrity: Callus present  No ulcer, skin breakdown, erythema, warmth or dry skin  Skin:     General: Skin is warm and dry  Capillary Refill: Capillary refill takes less than 2 seconds  Neurological:      Mental Status: He is alert and oriented to person, place, and time  Sensory: No sensory deficit  Motor: No abnormal muscle tone  Diabetic Foot Exam    Patient's shoes and socks removed  Right Foot/Ankle   Right Foot Inspection  Skin Exam: skin normal, callus and callus skin not intact, no dry skin, no warmth, no erythema, no maceration, no abnormal color, no pre-ulcer and no ulcer                          Toe Exam: ROM and strength within normal limits  Sensory     Proprioception: intact   Monofilament testing: intact  Vascular  Capillary refills: < 3 seconds  The right DP pulse is 2+  The right PT pulse is 2+  Left Foot/Ankle  Left Foot Inspection  Skin Exam: skin normal and callusskin not intact, no dry skin, no warmth, no erythema, no maceration, normal color, no pre-ulcer and no ulcer                         Toe Exam: ROM and strength within normal limits                   Sensory     Proprioception: intact  Monofilament: intact  Vascular  Capillary refills: < 3 seconds  The left DP pulse is 2+  The left PT pulse is 2+  Assign Risk Category:  No deformity present; No loss of protective sensation;  No weak pulses       Risk: 0    TCM Call (since 8/3/2020)     Date and time call was made  9/3/2020 10:31 AM    Hospital care reviewed  Records reviewed    Patient was hospitialized at  [address] [place] [address]    Date of Admission  09/01/20    Date of discharge  09/02/20    Diagnosis  nonintractable hemiplegic migraine    Disposition  Home    Were the patients medications reviewed and updated  Yes    Current Symptoms  None      TCM Call (since 8/3/2020)     Post hospital issues  None    Should patient be enrolled in anticoag monitoring? No    Scheduled for follow up?   Yes    Did you obtain your prescribed medications  No    Do you need help managing your prescriptions or medications  No    Is transportation to your appointment needed  No    I have advised the patient to call PCP with any new or worsening symptoms  Giovanna Gutierres RN

## 2020-09-03 NOTE — ASSESSMENT & PLAN NOTE
Multiple attempts in the past  Tried nicotine patch without success in the past  Prescribed Chantix, never started  Currently motivated to quit, using Nicotine patch 24mg  Established day to quit tomorrow  He would like to add Chantix to this regimen, he does have the prescription at home  Nicotine patch will be danyelle over the next few weeks  Information and support about smoking cessation given to the patient  Follow up in 4 weeks

## 2020-09-03 NOTE — ASSESSMENT & PLAN NOTE
BP persistently above 130/80 since last visit in the office in February 2020  In the view of recent diagnosis of diabetes, goal of blood pressure is below 130/80  Start Norvasc 2 5 mg daily

## 2020-09-03 NOTE — ASSESSMENT & PLAN NOTE
At this point this is a diagnosed of exclusion, patient did not have any headaches, or aura  Associated with his symptoms  TIA was ruled out  For now will continue to observe  If patient experienced another episode he will need further evaluation by specialist   I have instructed him to seek medical attention if he experiences the same symptoms again because with his risk factors of hypertension, diabetes and metabolic syndrome it is difficult to exclude an episode stroke or TIA  If he does have headache I have oriented him to take only NSAIDs or acetaminophen for the same  If he does not improve of have similar symptoms that brought him to the hospital he was oriented to seek medical attention immediately

## 2020-09-03 NOTE — ASSESSMENT & PLAN NOTE
Lab Results   Component Value Date    HGBA1C 6 8 (H) 09/02/2020   Recently diagnosed at hospital admission for possible TIA  Start metformin 500mg QD  Added aspirin 81mg due to elevated CV risk  Patient was instructed in regards to lifestyle modification, with low carbohydrate diet and exercise at least 3 times a week  Patient should measured blood sugars 3 times a week, 4 times a day-fasting, before lunch, dinner and at bedtime, and bring log on next visit  Hemoglobin A1c goal is keep less than 7%  Repeat A1c every 3-6 months  Yearly ophthalmological evaluation recommended and referral given this visit  Diabetic foot exam performed today and normal   Yearly protein to creatinine ratio measurement collected in the clinic

## 2020-09-03 NOTE — PATIENT INSTRUCTIONS
Please start metformin 500mg once daily with your biggest meal  Please take aspirin 81mg once daily  Please start taking amlodipine 2 5mg once daily   Check your blood sugar 3 times a week 4 times a day and bring the results in the next visit  Use nicotine patch 21mg/patch/day for 28 days then start 14mg/patch/day for 14 days then start 7mg/patch/day for 7 days then stop  Follow up in 1 month         Cigarette Smoking and Your Health   AMBULATORY CARE:   Risks to your health if you smoke:  Nicotine and other chemicals found in tobacco damage every cell in your body  Even if you are a light smoker, you have an increased risk for cancer, heart disease, and lung disease  If you are pregnant or have diabetes, smoking increases your risk for complications  Benefits to your health if you stop smoking:   · You decrease respiratory symptoms such as coughing, wheezing, and shortness of breath  · You reduce your risk for cancers of the lung, mouth, throat, kidney, bladder, pancreas, stomach, and cervix  If you already have cancer, you increase the benefits of chemotherapy  You also reduce your risk for cancer returning or a second cancer from developing  · You reduce your risk for heart disease, blood clots, heart attack, and stroke  · You reduce your risk for lung infections, and diseases such as pneumonia, asthma, chronic bronchitis, and emphysema  · Your circulation improves  More oxygen can be delivered to your body  If you have diabetes, you lower your risk for complications, such as kidney, artery, and eye diseases  You also lower your risk for nerve damage  Nerve damage can lead to amputations, poor vision, and blindness  · You improve your body's ability to heal and to fight infections  Benefits to the health of others if you stop smoking:  Tobacco is harmful to nonsmokers who breathe in your secondhand smoke   The following are ways the health of others around you may improve when you stop smoking:  · You lower the risks for lung cancer and heart disease in nonsmoking adults  · If you are pregnant, you lower the risk for miscarriage, early delivery, low birth weight, and stillbirth  You also lower your baby's risk for SIDS, obesity, developmental delay, and neurobehavioral problems, such as ADHD  · If you have children, you lower their risk for ear infections, colds, pneumonia, bronchitis, and asthma  For more information and support to stop smoking:   · [employer]  Phone: 9- 500 - 960-1429  Web Address: www [employer]  Follow up with your healthcare provider as directed:  Write down your questions so you remember to ask them during your visits  © 2017 [facility] [facility] Information is for End User's use only and may not be sold, redistributed or otherwise used for commercial purposes  All illustrations and images included in CareNotes® are the copyrighted property of [employer] M [employer]  or [employer] Angel Lai  The above information is an  only  It is not intended as medical advice for individual conditions or treatments  Talk to your doctor, nurse or pharmacist before following any medical regimen to see if it is safe and effective for you  10% - bad control"> 10% - bad control,Hemoglobin A1c (HbA1c) greater than 10% indicating poor diabetic control,Haemoglobin A1c greater than 10% indicating poor diabetic control">   Diabetes Mellitus Type 2 in Adults, Ambulatory Care   GENERAL INFORMATION:   Diabetes mellitus type 2  is a disease that affects how your body uses glucose (sugar)  Insulin helps move sugar out of the blood so it can be used for energy  Normally, when the blood sugar level increases, the pancreas makes more insulin  Type 2 diabetes develops because either the body cannot make enough insulin, or it cannot use the insulin correctly  After many years, your pancreas may stop making insulin    Common symptoms include the following: · More hunger or thirst than usual     · Frequent urination     · Weight loss without trying     · Blurred vision  Seek immediate care for the following symptoms:   · Severe abdominal pain, or pain that spreads to your back  You may also be vomiting  · Trouble staying awake or focusing    · Shaking or sweating    · Blurred or double vision    · Breath has a fruity, sweet smell    · Breathing is deep and labored, or rapid and shallow    · Heartbeat is fast and weak  Treatment for diabetes mellitus type 2  includes keeping your blood sugar at a normal level  You must eat the right foods, and exercise regularly  You may also need medicine if you cannot control your blood sugar level with nutrition and exercise  Manage diabetes mellitus type 2:   · Check your blood sugar level  You will be taught how to check a small drop of blood in a glucose monitor  Ask your healthcare provider when and how often to check during the day  Ask your healthcare provider what your blood sugar levels should be when you check them  · Keep track of carbohydrates (sugar and starchy foods)  Your blood sugar level can get too high if you eat too many carbohydrates  Your dietitian will help you plan meals and snacks that have the right amount of carbohydrates  · Eat low-fat foods  Some examples are skinless chicken and low-fat milk  · Eat less sodium (salt)  Some examples of high-sodium foods to limit are soy sauce, potato chips, and soup  Do not add salt to food you cook  Limit your use of table salt  · Eat high-fiber foods  Foods that are a good source of fiber include vegetables, whole grain bread, and beans  · Limit alcohol  Alcohol affects your blood sugar level and can make it harder to manage your diabetes  Women should limit alcohol to 1 drink a day  Men should limit alcohol to 2 drinks a day  A drink of alcohol is 12 ounces of beer, 5 ounces of wine, or 1½ ounces of liquor  · Get regular exercise  Exercise can help keep your blood sugar level steady, decrease your risk of heart disease, and help you lose weight  Exercise for at least 30 minutes, 5 days a week  Include muscle strengthening activities 2 days each week  Work with your healthcare provider to create an exercise plan  · Check your feet each day  for injuries or open sores  Ask your healthcare provider for activities you can do if you have an open sore  · Quit smoking  If you smoke, it is never too late to quit  Smoking can worsen the problems that may occur with diabetes  Ask your healthcare provider for information about how to stop smoking if you are having trouble quitting  · Ask about your weight:  Ask healthcare providers if you need to lose weight, and how much to lose  Ask them to help you with a weight loss program  Even a 10 to 15 pound weight loss can help you manage your blood sugar level  · Carry medical alert identification  Wear medical alert jewelry or carry a card that says you have diabetes  Ask your healthcare provider where to get these items  · Ask about vaccines  Diabetes puts you at risk of serious illness if you get the flu, pneumonia, or hepatitis  Ask your healthcare provider if you should get a flu, pneumonia, or hepatitis B vaccine, and when to get the vaccine  Follow up with your healthcare provider as directed:  Write down your questions so you remember to ask them during your visits  CARE AGREEMENT:   You have the right to help plan your care  Learn about your health condition and how it may be treated  Discuss treatment options with your caregivers to decide what care you want to receive  You always have the right to refuse treatment  The above information is an  only  It is not intended as medical advice for individual conditions or treatments  Talk to your doctor, nurse or pharmacist before following any medical regimen to see if it is safe and effective for you    © 2014 [employer] [address] is for End User's use only and may not be sold, redistributed or otherwise used for commercial purposes  All illustrations and images included in CareNotes® are the copyrighted property of [employer] M [employer]  or Jose Angel Lai  Meal Planning with Diabetes Exchanges   AMBULATORY CARE:   Diabetes exchanges  are servings of food that contain similar amounts of carbohydrate, fat, protein, and calories within a food group  The exchanges can be used to develop a healthy meal plan that helps to keep your blood sugar within the recommended levels  A meal plan with the right amount of carbohydrates is especially important  Your blood sugar naturally rises after you eat carbohydrates  Too many carbohydrates in 1 meal or snack can raise your blood sugar level  Carbohydrates are found in starches, fruit, milk, yogurt, and sweets  How to create a meal plan with exchanges:  A dietitian will work with you to develop a healthy meal plan that is right for you  This meal plan will include the amount of exchanges you can have from each food group throughout the day  Follow your meal plan by keeping track of the amount of exchanges you eat for each meal and snack  Your meal plan will be based on your age, weight, blood sugar levels, medicine, and activity level  Starch food group exchanges:  Each exchange below contains about 15 grams of carbohydrate , 3 grams of protein, 1 gram of fat, and 80 calories  · 1 ounce of white, whole wheat or rye bread (1 slice)    · 1 ounce of bagel (about ¼ of a bagel)    · 1 6-inch flour or corn tortilla or 1 4-inch pancake (about ¼ inch thick)    · ?  cup of cooked pasta or rice    · ¾ cup of dry, ready-to-eat cereal with no sugar added     · ½ cup of cooked cereal, such as oatmeal    · 3 nivia cracker squares or 8 animal crackers    · 6 saltine-type crackers or     · 3 cups of popcorn or ¾ ounce of pretzels     · Starchy vegetables and cooked legumes: ¨ ½ cup of corn, green peas, sweet potatoes, or mashed potatoes     ¨ ¼ of a large baked potato     ¨ 1 cup of acorn, butternut squash, or pumpkin     ¨ ½ cup of beans, lentils, or peas (such as guy, kidney, or black-eyed)    ¨ ? cup of lima beans  Fruit group exchanges:  Each exchange contains about 15 grams of carbohydrate  and 60 calories  · 1 small (4 ounce) apple, banana orange, or nectarine    · ½ cup of canned or fresh fruit    · ½ cup (4 ounces) of unsweetened fruit juice    · 2 tablespoons of dried fruit  Milk group exchanges:  Each exchange contains about 12 grams of carbohydrate  and 8 grams of protein  The amount of fat and calories in each serving depends on the type of milk (such as whole, low-fat, or fat-free)  · 1 cup fat-free or low-fat milk    · ¾ cup of plain, nonfat yogurt    · 1 cup fat-free, flavored yogurt with artificial (no calorie) sweetener  Non-starchy vegetable group exchanges:  Each exchange contains about 5 grams of carbohydrate , 2 grams of protein, and 25 calories  Examples include beets, broccoli, cabbage, carrots, cauliflower, cucumber, mushrooms, tomatoes, and zucchini  · ½ cup of cooked vegetables or 1 cup of raw vegetables     · ½ cup of vegetable juice  Meat and meat substitute group exchanges:  Each exchange of a lean meat  listed below contains about 7 grams of protein, 0 to 3 grams of fat, and 45 calories  The meat and meat substitutes food group does not contain any carbohydrates  Medium and high-fat meats have more calories  · 1 ounce of chicken or turkey without skin, or 1 ounce of fish (not breaded or fried)     · 1 ounce of lean beef, pork, or lamb     · 1-inch cube or 1 ounce of low-fat cheese     · 2 egg whites or ¼ cup of egg substitute     · ½ cup of tofu  Sweets, desserts, and other carbohydrate group exchanges:   · Sweets and other desserts:  Each exchange has about 15 grams of carbohydrate       ¨ 1 ounce of delvin food cake or 2-inch square cake (unfrosted)    ¨ 2 small cookies     ¨ ½ cup of sugar-free, fat-free ice cream    ¨ 1 tablespoon of syrup, jam, jelly, table sugar, or honey    · Combination foods:     ¨ 1 cup of an entrée, such as lasagna, spaghetti with meatballs, macaroni and cheese, and chili with beans (each serving counts as 2 carbohydrate exchanges )     ¨ 1 cup of tomato or vegetable beef soup (each serving counts as 1 carbohydrate exchange )  Fat group exchanges:  Each exchange contains 5 grams of fat and 45 calories  · 1 teaspoon of oil (such as canola, olive, or corn oil)     · 6 almonds or cashews, 10 peanuts, or 4 pecan halves     · 2 tablespoons of avocado     · ½ tablespoon of peanut butter     · 1 teaspoon of regular margarine or 2 teaspoons of low-fat margarine     · 1 teaspoon of regular butter or 1 tablespoon of low-fat butter     · 1 teaspoon of regular mayonnaise or 1 tablespoon of low-fat mayonnaise     · 1 tablespoon of regular salad dressing or 2 tablespoons of low-fat salad dressing  Free foods: The foods on this list are called free foods because they have very few calories  Free foods usually do not increase your blood sugar if you limit them  · 1 tablespoon of catsup or taco sauce     · ¼ cup of salsa     · 2 tablespoons of sugar-free syrup or 2 teaspoons of light jam or jelly     · 1 tablespoon of fat-free salad dressing     · 4 tablespoons of fat-free margarine or fat-free mayonnaise     · Sugar-free drinks: diet soda, sugar-free drink mixes, or mineral water     · Low-sodium bouillon or fat-free broth     · Mustard     · Seasonings such as spices, herbs, and garlic     · Sugar-free gelatin without added fruit  Other healthy nutrition guidelines:   · Eat more fiber  Choose foods that are good sources of fiber, such as fruits, vegetables, and whole grains  Cereals that contain 5 or more grams of fiber per serving are good sources of fiber   Legumes such as garbanzo, guy beans, kidney beans, and lentils are also good sources  · Limit fat  Ask your dietitian or healthcare provider how much fat you should eat each day  Choose foods low in fat, saturated fat, trans fat, and cholesterol  Examples include turkey or chicken without the skin, fish, lean cuts of meat, and beans  Low-fat dairy foods, such as low-fat or fat-free milk and low-fat yogurt are also good choices  Omega-3 fatty acids are healthy fats that are found in canola oil, soybean oil and fatty fish  [place], albacore tuna, and sardines are good sources of omega 3 fatty acids  Eat 2 servings of these types of fish each week  Do not eat fried fish  · Limit sugar  Sugar and sweets must be counted toward the carbohydrate exchanges that you can have within your meal plan  Limit sugar and sweets because they are usually also high in calories and fat  Eat smaller portions of sweets by sharing a dessert or asking for a child-size portion at a restaurant  · Limit sodium  (salt) to about 2,300 mg per day  You may need to eat even less sodium if you have certain medical conditions  Foods high in sodium include soy sauce, potato chips, and soup  · Limit alcohol  Ask your healthcare provider if it is safe for you to drink alcohol  If alcohol is safe for you to have, eat a meal when you drink alcohol  If you drink alcohol on an empty stomach, your blood sugar may drop to a low level  Women should limit alcohol to 1 drink per day  Men should limit alcohol to 2 drinks per day  A drink of alcohol is 5 ounces of wine, 12 ounces of beer, or 1½ ounces of liquor  Other ways to manage your diabetes:   · Control your blood sugar level  Test your blood sugar level regularly and keep a record of the results  Ask your healthcare provider when and how often to test your blood sugar  You may need to check your blood sugar level at least 3 times each day  · Talk to your healthcare provider about your weight  Ask if you need to lose weight, and how much you need to lose   If you are overweight, you may need to make other changes to lose weight  Ask your healthcare provider to help you create a weight loss program      · Exercise  can help to control your blood sugar levels and decrease your risk of heart disease  It can also help you lose or maintain your weight  Get at least 30 minutes of exercise, 5 times each week  Do resistance training (using weights) 2 times each week  Do not sit for longer than 90 minutes  Work with your healthcare provider to plan the best exercise program for you  Contact your healthcare provider if:   · You have high blood sugar levels during a certain time of day, or almost all of the time  · You often have low blood sugar levels  · You have questions or concerns about your condition or care  © 2017 [address] [facility] Information is for End User's use only and may not be sold, redistributed or otherwise used for commercial purposes  All illustrations and images included in CareNotes® are the copyrighted property of A D A M [employer] Inc  or Jose Angel Lai  The above information is an  only  It is not intended as medical advice for individual conditions or treatments  Talk to your doctor, nurse or pharmacist before following any medical regimen to see if it is safe and effective for you

## 2020-09-03 NOTE — ASSESSMENT & PLAN NOTE
Evidence by hypertension, high triglycerides, low HDL, diabetes  Lifestyle modification reinforced  Treatment for diabetes and hypertension started  Repeat lipid panel in 3-6 months

## 2020-10-05 ENCOUNTER — TELEMEDICINE (OUTPATIENT)
Dept: INTERNAL MEDICINE CLINIC | Facility: CLINIC | Age: 45
End: 2020-10-05
Payer: COMMERCIAL

## 2020-10-05 DIAGNOSIS — Z20.828 EXPOSURE TO SARS-ASSOCIATED CORONAVIRUS: ICD-10-CM

## 2020-10-05 DIAGNOSIS — Z20.828 EXPOSURE TO SARS-ASSOCIATED CORONAVIRUS: Primary | ICD-10-CM

## 2020-10-05 PROCEDURE — U0003 INFECTIOUS AGENT DETECTION BY NUCLEIC ACID (DNA OR RNA); SEVERE ACUTE RESPIRATORY SYNDROME CORONAVIRUS 2 (SARS-COV-2) (CORONAVIRUS DISEASE [COVID-19]), AMPLIFIED PROBE TECHNIQUE, MAKING USE OF HIGH THROUGHPUT TECHNOLOGIES AS DESCRIBED BY CMS-2020-01-R: HCPCS | Performed by: INTERNAL MEDICINE

## 2020-10-05 PROCEDURE — 99214 OFFICE O/P EST MOD 30 MIN: CPT | Performed by: INTERNAL MEDICINE

## 2020-10-06 LAB — SARS-COV-2 RNA SPEC QL NAA+PROBE: NOT DETECTED

## 2020-10-07 ENCOUNTER — TELEMEDICINE (OUTPATIENT)
Dept: INTERNAL MEDICINE CLINIC | Facility: CLINIC | Age: 45
End: 2020-10-07
Payer: COMMERCIAL

## 2020-10-07 DIAGNOSIS — J06.9 VIRAL UPPER RESPIRATORY TRACT INFECTION: Primary | ICD-10-CM

## 2020-10-07 DIAGNOSIS — E11.9 TYPE 2 DIABETES MELLITUS WITHOUT COMPLICATION, WITHOUT LONG-TERM CURRENT USE OF INSULIN (HCC): ICD-10-CM

## 2020-10-07 PROCEDURE — 4004F PT TOBACCO SCREEN RCVD TLK: CPT | Performed by: INTERNAL MEDICINE

## 2020-10-07 PROCEDURE — 99213 OFFICE O/P EST LOW 20 MIN: CPT | Performed by: INTERNAL MEDICINE

## 2020-10-26 ENCOUNTER — OFFICE VISIT (OUTPATIENT)
Dept: URGENT CARE | Age: 45
End: 2020-10-26
Payer: COMMERCIAL

## 2020-10-26 VITALS — OXYGEN SATURATION: 100 % | RESPIRATION RATE: 18 BRPM | TEMPERATURE: 98.6 F | HEART RATE: 76 BPM

## 2020-10-26 DIAGNOSIS — Z20.822 COVID-19 RULED OUT: Primary | ICD-10-CM

## 2020-10-26 PROCEDURE — U0003 INFECTIOUS AGENT DETECTION BY NUCLEIC ACID (DNA OR RNA); SEVERE ACUTE RESPIRATORY SYNDROME CORONAVIRUS 2 (SARS-COV-2) (CORONAVIRUS DISEASE [COVID-19]), AMPLIFIED PROBE TECHNIQUE, MAKING USE OF HIGH THROUGHPUT TECHNOLOGIES AS DESCRIBED BY CMS-2020-01-R: HCPCS | Performed by: PHYSICIAN ASSISTANT

## 2020-10-26 PROCEDURE — 99213 OFFICE O/P EST LOW 20 MIN: CPT | Performed by: PHYSICIAN ASSISTANT

## 2020-10-28 ENCOUNTER — TELEPHONE (OUTPATIENT)
Dept: URGENT CARE | Age: 45
End: 2020-10-28

## 2020-10-28 LAB — SARS-COV-2 RNA SPEC QL NAA+PROBE: NOT DETECTED

## 2021-01-11 ENCOUNTER — APPOINTMENT (EMERGENCY)
Dept: RADIOLOGY | Facility: HOSPITAL | Age: 46
End: 2021-01-11
Payer: COMMERCIAL

## 2021-01-11 ENCOUNTER — HOSPITAL ENCOUNTER (EMERGENCY)
Facility: HOSPITAL | Age: 46
Discharge: HOME | End: 2021-01-11
Attending: EMERGENCY MEDICINE
Payer: COMMERCIAL

## 2021-01-11 VITALS
OXYGEN SATURATION: 96 % | WEIGHT: 185 LBS | HEART RATE: 58 BPM | BODY MASS INDEX: 25.8 KG/M2 | SYSTOLIC BLOOD PRESSURE: 126 MMHG | TEMPERATURE: 98 F | RESPIRATION RATE: 18 BRPM | DIASTOLIC BLOOD PRESSURE: 69 MMHG

## 2021-01-11 DIAGNOSIS — R07.9 CHEST PAIN: Primary | ICD-10-CM

## 2021-01-11 DIAGNOSIS — R20.0 NUMBNESS OF RIGHT LOWER EXTREMITY: ICD-10-CM

## 2021-01-11 DIAGNOSIS — E11.9 TYPE 2 DIABETES MELLITUS WITHOUT COMPLICATION, WITHOUT LONG-TERM CURRENT USE OF INSULIN (HCC): ICD-10-CM

## 2021-01-11 DIAGNOSIS — E88.81 METABOLIC SYNDROME: ICD-10-CM

## 2021-01-11 LAB
ALBUMIN SERPL BCP-MCNC: 4.1 G/DL (ref 3.5–5)
ALP SERPL-CCNC: 75 U/L (ref 46–116)
ALT SERPL W P-5'-P-CCNC: 49 U/L (ref 12–78)
ANION GAP SERPL CALCULATED.3IONS-SCNC: 5 MMOL/L (ref 4–13)
AST SERPL W P-5'-P-CCNC: 24 U/L (ref 5–45)
ATRIAL RATE: 77 BPM
BASOPHILS # BLD AUTO: 0.05 THOUSANDS/ΜL (ref 0–0.1)
BASOPHILS NFR BLD AUTO: 1 % (ref 0–1)
BILIRUB SERPL-MCNC: 0.23 MG/DL (ref 0.2–1)
BUN SERPL-MCNC: 10 MG/DL (ref 5–25)
CALCIUM SERPL-MCNC: 8.8 MG/DL (ref 8.3–10.1)
CHLORIDE SERPL-SCNC: 105 MMOL/L (ref 100–108)
CO2 SERPL-SCNC: 28 MMOL/L (ref 21–32)
CREAT SERPL-MCNC: 0.92 MG/DL (ref 0.6–1.3)
D DIMER PPP FEU-MCNC: <0.27 UG/ML FEU
EOSINOPHIL # BLD AUTO: 0.02 THOUSAND/ΜL (ref 0–0.61)
EOSINOPHIL NFR BLD AUTO: 0 % (ref 0–6)
ERYTHROCYTE [DISTWIDTH] IN BLOOD BY AUTOMATED COUNT: 15 % (ref 11.6–15.1)
GFR SERPL CREATININE-BSD FRML MDRD: 117 ML/MIN/1.73SQ M
GLUCOSE SERPL-MCNC: 111 MG/DL (ref 65–140)
HCT VFR BLD AUTO: 40.7 % (ref 36.5–49.3)
HGB BLD-MCNC: 12.9 G/DL (ref 12–17)
IMM GRANULOCYTES # BLD AUTO: 0.05 THOUSAND/UL (ref 0–0.2)
IMM GRANULOCYTES NFR BLD AUTO: 1 % (ref 0–2)
LYMPHOCYTES # BLD AUTO: 1.07 THOUSANDS/ΜL (ref 0.6–4.47)
LYMPHOCYTES NFR BLD AUTO: 13 % (ref 14–44)
MCH RBC QN AUTO: 26.9 PG (ref 26.8–34.3)
MCHC RBC AUTO-ENTMCNC: 31.7 G/DL (ref 31.4–37.4)
MCV RBC AUTO: 85 FL (ref 82–98)
MONOCYTES # BLD AUTO: 0.61 THOUSAND/ΜL (ref 0.17–1.22)
MONOCYTES NFR BLD AUTO: 7 % (ref 4–12)
NEUTROPHILS # BLD AUTO: 6.7 THOUSANDS/ΜL (ref 1.85–7.62)
NEUTS SEG NFR BLD AUTO: 78 % (ref 43–75)
NRBC BLD AUTO-RTO: 0 /100 WBCS
P AXIS: 33 DEGREES
PLATELET # BLD AUTO: 256 THOUSANDS/UL (ref 149–390)
PMV BLD AUTO: 10.4 FL (ref 8.9–12.7)
POTASSIUM SERPL-SCNC: 4.5 MMOL/L (ref 3.5–5.3)
PR INTERVAL: 144 MS
PROT SERPL-MCNC: 7.9 G/DL (ref 6.4–8.2)
QRS AXIS: 68 DEGREES
QRSD INTERVAL: 86 MS
QT INTERVAL: 368 MS
QTC INTERVAL: 416 MS
RBC # BLD AUTO: 4.79 MILLION/UL (ref 3.88–5.62)
SODIUM SERPL-SCNC: 138 MMOL/L (ref 136–145)
T WAVE AXIS: 21 DEGREES
TROPONIN I SERPL-MCNC: <0.02 NG/ML
TROPONIN I SERPL-MCNC: <0.02 NG/ML
VENTRICULAR RATE: 77 BPM
WBC # BLD AUTO: 8.5 THOUSAND/UL (ref 4.31–10.16)

## 2021-01-11 PROCEDURE — 84484 ASSAY OF TROPONIN QUANT: CPT | Performed by: PHYSICIAN ASSISTANT

## 2021-01-11 PROCEDURE — 71045 X-RAY EXAM CHEST 1 VIEW: CPT

## 2021-01-11 PROCEDURE — 99285 EMERGENCY DEPT VISIT HI MDM: CPT

## 2021-01-11 PROCEDURE — 93005 ELECTROCARDIOGRAM TRACING: CPT

## 2021-01-11 PROCEDURE — 99285 EMERGENCY DEPT VISIT HI MDM: CPT | Performed by: PHYSICIAN ASSISTANT

## 2021-01-11 PROCEDURE — 83036 HEMOGLOBIN GLYCOSYLATED A1C: CPT

## 2021-01-11 PROCEDURE — 85025 COMPLETE CBC W/AUTO DIFF WBC: CPT | Performed by: PHYSICIAN ASSISTANT

## 2021-01-11 PROCEDURE — 96374 THER/PROPH/DIAG INJ IV PUSH: CPT

## 2021-01-11 PROCEDURE — 80053 COMPREHEN METABOLIC PANEL: CPT | Performed by: PHYSICIAN ASSISTANT

## 2021-01-11 PROCEDURE — 80061 LIPID PANEL: CPT

## 2021-01-11 PROCEDURE — 93010 ELECTROCARDIOGRAM REPORT: CPT | Performed by: INTERNAL MEDICINE

## 2021-01-11 PROCEDURE — 36415 COLL VENOUS BLD VENIPUNCTURE: CPT | Performed by: PHYSICIAN ASSISTANT

## 2021-01-11 PROCEDURE — 85379 FIBRIN DEGRADATION QUANT: CPT | Performed by: PHYSICIAN ASSISTANT

## 2021-01-11 PROCEDURE — 96375 TX/PRO/DX INJ NEW DRUG ADDON: CPT

## 2021-01-11 RX ORDER — ONDANSETRON 2 MG/ML
4 INJECTION INTRAMUSCULAR; INTRAVENOUS ONCE
Status: COMPLETED | OUTPATIENT
Start: 2021-01-11 | End: 2021-01-11

## 2021-01-11 RX ORDER — KETOROLAC TROMETHAMINE 30 MG/ML
15 INJECTION, SOLUTION INTRAMUSCULAR; INTRAVENOUS ONCE
Status: COMPLETED | OUTPATIENT
Start: 2021-01-11 | End: 2021-01-11

## 2021-01-11 RX ADMIN — KETOROLAC TROMETHAMINE 15 MG: 30 INJECTION, SOLUTION INTRAMUSCULAR at 11:13

## 2021-01-11 RX ADMIN — ONDANSETRON 4 MG: 2 INJECTION INTRAMUSCULAR; INTRAVENOUS at 11:13

## 2021-01-11 NOTE — ED PROVIDER NOTES
History  Chief Complaint   Patient presents with    Chest Pain     patient reports that he developed CP and right sided numbness at 3 am while at work  c/o general exhaustion at this time  no known sick contacts or cardiac issues  pt took 3 baby aspirins PTA     Izabela Castaneda is a 41 y o  male with a PMhx of DM and Tobacco Dependence who presents to the ED with complaints of recurrent right-sided chest pain since 0400 today  Patient states pain has occurred twice today  Patient states pain is worse with breathing out  Patient states he took Aspirin prior to arrival  Patient states during the episode today he was experiencing nausea and felt a "bile taste" in his throat  Patient states has Tuesday he was at work when he starting having chest pain feeling like something is "pushing" on my chest with associated SOB and right leg numbness/tingling which improved after he took Aspirin and rested at home  Denies cough, congestion, hemoptysis, injury, fall, back pain, urinary or bowel incontinence, saddle anesthesia, abdominal pain, vomiting, diarrhea, fever, chills  Denies leg pain or leg swelling  Denies previous hx of DVT/PE  History provided by:  Patient  Chest Pain  Pain location:  R chest  Pain quality: pressure and stabbing    Duration:  8 hours  Timing:  Intermittent  Progression:  Waxing and waning  Context: breathing    Associated symptoms: heartburn, nausea and numbness    Associated symptoms: no abdominal pain, no anorexia, no back pain, no cough, no dizziness, no dysphagia, no fever, no headache, no near-syncope, no orthopnea, no palpitations, no shortness of breath, not vomiting and no weakness    Risk factors: diabetes mellitus, high cholesterol, male sex and smoking    Risk factors: no prior DVT/PE and no surgery        Prior to Admission Medications   Prescriptions Last Dose Informant Patient Reported? Taking?    Blood Glucose Monitoring Suppl (ONE TOUCH ULTRA 2) w/Device KIT Not Taking at Unknown time  No No   Sig: by Does not apply route 3 (three) times a day   Patient not taking: Reported on 1/11/2021   Lancets (onetouch ultrasoft) lancets Not Taking at Unknown time  No No   Sig: Use as instructed   Patient not taking: Reported on 1/11/2021   amLODIPine (NORVASC) 2 5 mg tablet 1/11/2021 at Unknown time  No Yes   Sig: Take 1 tablet (2 5 mg total) by mouth daily   aspirin (ECOTRIN LOW STRENGTH) 81 mg EC tablet 1/11/2021 at Unknown time  No Yes   Sig: Take 1 tablet (81 mg total) by mouth daily   glucose blood (OneTouch Ultra) test strip Not Taking at Unknown time  No No   Sig: Use as instructed   Patient not taking: Reported on 1/11/2021   metFORMIN (GLUCOPHAGE-XR) 500 mg 24 hr tablet 1/11/2021 at Unknown time  No Yes   Sig: Take 1 tablet (500 mg total) by mouth daily with lunch   nicotine (NICODERM CQ) 14 mg/24hr TD 24 hr patch Not Taking at Unknown time  No No   Sig: Place 1 patch on the skin every 24 hours   Patient not taking: Reported on 10/26/2020   nicotine (NICODERM CQ) 7 mg/24hr TD 24 hr patch Not Taking at Unknown time  No No   Sig: Place 1 patch on the skin every 24 hours   Patient not taking: Reported on 10/26/2020      Facility-Administered Medications: None       Past Medical History:   Diagnosis Date    Diabetes mellitus ([facility] [address] )     Hypertension        History reviewed  No pertinent surgical history  Family History   Problem Relation Age of Onset    Diabetes Mother     Diabetes Father      I have reviewed and agree with the history as documented      E-Cigarette/Vaping    E-Cigarette Use Never User      E-Cigarette/Vaping Substances     Social History     Tobacco Use    Smoking status: Current Every Day Smoker     Packs/day: 1 00     Years: 25 00     Pack years: 25 00     Types: Cigarettes     Start date: 2/18/1995    Smokeless tobacco: Never Used   Substance Use Topics    Alcohol use: Yes     Frequency: 2-3 times a week     Drinks per session: 1 or 2     Binge frequency: Never    Drug use: Never       Review of Systems   Constitutional: Negative for appetite change, chills, fever and unexpected weight change  HENT: Negative for congestion, drooling, ear pain, rhinorrhea, sore throat, trouble swallowing and voice change  Eyes: Negative for pain, discharge, redness and visual disturbance  Respiratory: Negative for cough, shortness of breath, wheezing and stridor  Cardiovascular: Positive for chest pain  Negative for palpitations, orthopnea, leg swelling and near-syncope  Gastrointestinal: Positive for heartburn and nausea  Negative for abdominal pain, anorexia, blood in stool, constipation, diarrhea and vomiting  Genitourinary: Negative for dysuria, flank pain, frequency, hematuria and urgency  Musculoskeletal: Negative for back pain, gait problem, joint swelling, neck pain and neck stiffness  Skin: Negative for color change and rash  Neurological: Positive for numbness  Negative for dizziness, seizures, weakness, light-headedness and headaches  Physical Exam  Physical Exam  Vitals signs and nursing note reviewed  Constitutional:       Appearance: He is well-developed  HENT:      Head: Normocephalic and atraumatic  Nose: Nose normal    Eyes:      Conjunctiva/sclera: Conjunctivae normal       Pupils: Pupils are equal, round, and reactive to light  Neck:      Musculoskeletal: Normal range of motion and neck supple  Cardiovascular:      Rate and Rhythm: Normal rate and regular rhythm  Pulmonary:      Effort: Pulmonary effort is normal       Breath sounds: Normal breath sounds  Chest:      Chest wall: Tenderness present  No swelling  Musculoskeletal: Normal range of motion  Skin:     General: Skin is warm and dry  Capillary Refill: Capillary refill takes less than 2 seconds  Neurological:      Mental Status: He is alert and oriented to person, place, and time  GCS: GCS eye subscore is 4  GCS verbal subscore is 5  GCS motor subscore is 6  Cranial Nerves: Cranial nerves are intact  Sensory: Sensation is intact  Motor: Motor function is intact  Coordination: Coordination is intact           Vital Signs  ED Triage Vitals   Temperature Pulse Respirations Blood Pressure SpO2   01/11/21 1035 01/11/21 1034 01/11/21 1034 01/11/21 1034 01/11/21 1034   98 °F (36 7 °C) 82 18 142/73 98 %      Temp Source Heart Rate Source Patient Position - Orthostatic VS BP Location FiO2 (%)   01/11/21 1035 01/11/21 1034 01/11/21 1034 01/11/21 1034 --   Oral Monitor Lying Right arm       Pain Score       --                  Vitals:    01/11/21 1200 01/11/21 1230 01/11/21 1300 01/11/21 1315   BP: 130/76 134/74 126/69    Pulse: 66 62 58 58   Patient Position - Orthostatic VS: Lying Lying           Visual Acuity      ED Medications  Medications   ketorolac (TORADOL) injection 15 mg (15 mg Intravenous Given 1/11/21 1113)   ondansetron (ZOFRAN) injection 4 mg (4 mg Intravenous Given 1/11/21 1113)       Diagnostic Studies  Results Reviewed     Procedure Component Value Units Date/Time    Troponin I [[id number]]  (Normal) Collected: 01/11/21 1422    Lab Status: Final result Specimen: Blood from Arm, Right Updated: 01/11/21 1448     Troponin I <0 02 ng/mL     D-Dimer [[id number]]  (Normal) Collected: 01/11/21 1130    Lab Status: Final result Specimen: Blood from Arm, Right Updated: 01/11/21 1215     D-Dimer, Quant <0 27 ug/ml FEU     Troponin I [[id number]]  (Normal) Collected: 01/11/21 1110    Lab Status: Final result Specimen: Blood from Arm, Right Updated: 01/11/21 1139     Troponin I <0 02 ng/mL     Comprehensive metabolic panel [[id number]] Collected: 01/11/21 1110    Lab Status: Final result Specimen: Blood from Arm, Right Updated: 01/11/21 1135     Sodium 138 mmol/L      Potassium 4 5 mmol/L      Chloride 105 mmol/L      CO2 28 mmol/L      ANION GAP 5 mmol/L      BUN 10 mg/dL      Creatinine 0 92 mg/dL      Glucose 111 mg/dL      Calcium 8 8 mg/dL      AST 24 U/L ALT 49 U/L      Alkaline Phosphatase 75 U/L      Total Protein 7 9 g/dL      Albumin 4 1 g/dL      Total Bilirubin 0 23 mg/dL      eGFR 117 ml/min/1 73sq m     Narrative:      National Kidney Disease Foundation guidelines for Chronic Kidney Disease (CKD):     Stage 1 with normal or high GFR (GFR > 90 mL/min/1 73 square meters)    Stage 2 Mild CKD (GFR = 60-89 mL/min/1 73 square meters)    Stage 3A Moderate CKD (GFR = 45-59 mL/min/1 73 square meters)    Stage 3B Moderate CKD (GFR = 30-44 mL/min/1 73 square meters)    Stage 4 Severe CKD (GFR = 15-29 mL/min/1 73 square meters)    Stage 5 End Stage CKD (GFR <15 mL/min/1 73 square meters)  Note: GFR calculation is accurate only with a steady state creatinine    CBC and differential [[id number]]  (Abnormal) Collected: 01/11/21 1110    Lab Status: Final result Specimen: Blood from Arm, Right Updated: 01/11/21 1125     WBC 8 50 Thousand/uL      RBC 4 79 Million/uL      Hemoglobin 12 9 g/dL      Hematocrit 40 7 %      MCV 85 fL      MCH 26 9 pg      MCHC 31 7 g/dL      RDW 15 0 %      MPV 10 4 fL      Platelets 133 Thousands/uL      nRBC 0 /100 WBCs      Neutrophils Relative 78 %      Immat GRANS % 1 %      Lymphocytes Relative 13 %      Monocytes Relative 7 %      Eosinophils Relative 0 %      Basophils Relative 1 %      Neutrophils Absolute 6 70 Thousands/µL      Immature Grans Absolute 0 05 Thousand/uL      Lymphocytes Absolute 1 07 Thousands/µL      Monocytes Absolute 0 61 Thousand/µL      Eosinophils Absolute 0 02 Thousand/µL      Basophils Absolute 0 05 Thousands/µL                  XR chest 1 view portable   ED Interpretation by Carolynn Romberg, PA-C (01/11 1155)   No acute cardiopulmonary disease      Final Result by Monica Harden MD (01/11 0516)      No acute cardiopulmonary disease                    Workstation performed: [id number]                    Procedures  ECG 12 Lead Documentation Only    Date/Time: 1/11/2021 10:39 AM  Performed by: Anish Domingo KRISTIAN Melvin  Authorized by: Simi Bautista DO     Indications / Diagnosis:  Chest Pain  Patient location:  ED  Previous ECG:     Previous ECG:  Compared to current    Comparison ECG info:  09/01/2020  Rate:     ECG rate:  77    ECG rate assessment: normal    Rhythm:     Rhythm: sinus rhythm    QRS:     QRS axis:  Normal  ST segments:     ST segments:  Normal  T waves:     T waves: normal    Comments:      No acute ST or T wave changes  QT/QTc 368/416  ED Course  ED Course as of Jan 11 1452   Mon Jan 11, 2021   1229 Discussing results with patient  Patient states pain has improved with IV toradol  Patient is agreeable to delta troponin  56 Educated patient regarding diagnosis and management  Advised patient to follow up with PCP  Advised patient to RTER for persistent or worsening symptoms  HEART Risk Score      Most Recent Value   Heart Score Risk Calculator   History  1 Filed at: 01/11/2021 1223   ECG  0 Filed at: 01/11/2021 1223   Age  0 Filed at: 01/11/2021 1223   Risk Factors  2 Filed at: 01/11/2021 1223   Troponin  0 Filed at: 01/11/2021 1223   HEART Score  3 Filed at: 01/11/2021 1223              PERC Rule for PE      Most Recent Value   PERC Rule for PE   Age >=50  0 Filed at: 01/11/2021 1250   HR >=100  0 Filed at: 01/11/2021 1250   O2 Sat on room air < 95%  0 Filed at: 01/11/2021 1250   History of PE or DVT  0 Filed at: 01/11/2021 1250   Recent trauma or surgery  0 Filed at: 01/11/2021 1250   Hemoptysis  0 Filed at: 01/11/2021 1250   Exogenous estrogen  0 Filed at: 01/11/2021 1250   Unilateral leg swelling  0 Filed at: 01/11/2021 1250   PERC Rule for PE Results  0 Filed at: 01/11/2021 1250              SBIRT 20yo+      Most Recent Value   SBIRT (25 yo +)   In order to provide better care to our patients, we are screening all of our patients for alcohol and drug use  Would it be okay to ask you these screening questions?   Yes Filed at: 01/11/2021 1036   Initial Alcohol Screen: US AUDIT-C    1  How often do you have a drink containing alcohol?  0 Filed at: 01/11/2021 1036   2  How many drinks containing alcohol do you have on a typical day you are drinking? 0 Filed at: 01/11/2021 1036   3a  Male UNDER 65: How often do you have five or more drinks on one occasion? 0 Filed at: 01/11/2021 1036   3b  FEMALE Any Age, or MALE 65+: How often do you have 4 or more drinks on one occassion? 0 Filed at: 01/11/2021 1036   Audit-C Score  0 Filed at: 01/11/2021 1036   ZANA: How many times in the past year have you    Used an illegal drug or used a prescription medication for non-medical reasons? Never Filed at: 01/11/2021 1036          Wells' Criteria for PE      Most Recent Value   Wells' Criteria for PE   Clinical signs and symptoms of DVT  0 Filed at: 01/11/2021 1250   PE is primary diagnosis or equally likely  0 Filed at: 01/11/2021 1250   HR >100  0 Filed at: 01/11/2021 1250   Immobilization at least 3 days or Surgery in the previous 4 weeks  0 Filed at: 01/11/2021 1250   Previous, objectively diagnosed PE or DVT  0 Filed at: 01/11/2021 1250   Hemoptysis  0 Filed at: 01/11/2021 1250   Malignancy with treatment within 6 months or palliative  0 Filed at: 01/11/2021 1250   Wells' Criteria Total  0 Filed at: 01/11/2021 1250                MDM  Number of Diagnoses or Management Options  Chest pain: new and requires workup  Numbness of right lower extremity: new and requires workup  Diagnosis management comments: Initial EKG unchanged from prior  Patient has reproducible chest pain on examination  Patient states pain is improved with IV Toradol  Heart score 3  Initial and repeat troponin < 0 02  Patient was advised to follow up with cardiology  I provided patient with strict RTER precautions  I advised patient follow-up with PCP in 24-48 hours  Patient verbalized understanding          Amount and/or Complexity of Data Reviewed  Clinical lab tests: ordered and reviewed  Tests in the radiology section of CPT®: ordered and reviewed  Review and summarize past medical records: yes    Patient Progress  Patient progress: stable      Disposition  Final diagnoses:   Chest pain   Numbness of right lower extremity     Time reflects when diagnosis was documented in both MDM as applicable and the Disposition within this note     Time User Action Codes Description Comment    1/11/2021  2:49 PM [facility] [place] [facility] [R07 9] Chest pain     1/11/2021  2:50 PM Alicia [facility] Add [R20 0] Numbness of right lower extremity       ED Disposition     ED Disposition Condition Date/Time Comment    Discharge Stable Mon Jan 11, 2021  2:49 PM Magda Reyna discharge to home/self care  Follow-up Information     Follow up With Specialties Details Why Contact Info Additional [address] [facility] Emergency Department Emergency Medicine Go to  If symptoms worsen [address]  [phone]  ED, [place], [place], [facility]    Hanh Angel MD Internal Medicine Schedule an appointment as soon as possible for a visit   [address] [facility] [id number] [facility] Cardiology Schedule an appointment as soon as possible for a visit   [facility] [phone] [id number] Cardiology [address] [facility], [address] [place] [address], [facility] [address]          Patient's Medications   Discharge Prescriptions    No medications on file     No discharge procedures on file      PDMP Review     None          ED Provider  Electronically Signed by           Claudetta Daunt, PA-C  01/11/21 5081

## 2021-01-11 NOTE — DISCHARGE INSTRUCTIONS

## 2021-01-11 NOTE — Clinical Note
Evens Fox was seen and treated in our emergency department on 1/11/2021  Diagnosis:     Ayush Paul  may return to work on return date  He may return on this date: 01/13/2021         If you have any questions or concerns, please don't hesitate to call        Shen Montiel PA-C    ______________________________           _______________          _______________  Hospital Representative                              Date                                Time

## 2021-01-12 ENCOUNTER — OFFICE VISIT (OUTPATIENT)
Dept: INTERNAL MEDICINE CLINIC | Facility: CLINIC | Age: 46
End: 2021-01-12
Payer: COMMERCIAL

## 2021-01-12 VITALS
DIASTOLIC BLOOD PRESSURE: 72 MMHG | WEIGHT: 191 LBS | BODY MASS INDEX: 26.64 KG/M2 | OXYGEN SATURATION: 97 % | TEMPERATURE: 98.8 F | HEART RATE: 69 BPM | SYSTOLIC BLOOD PRESSURE: 142 MMHG | RESPIRATION RATE: 20 BRPM

## 2021-01-12 DIAGNOSIS — F17.210 CONTINUOUS DEPENDENCE ON CIGARETTE SMOKING: ICD-10-CM

## 2021-01-12 DIAGNOSIS — E11.9 TYPE 2 DIABETES MELLITUS WITHOUT COMPLICATION, WITHOUT LONG-TERM CURRENT USE OF INSULIN (HCC): ICD-10-CM

## 2021-01-12 DIAGNOSIS — I10 ESSENTIAL HYPERTENSION: ICD-10-CM

## 2021-01-12 DIAGNOSIS — R59.9 LYMPH NODE ENLARGEMENT: ICD-10-CM

## 2021-01-12 DIAGNOSIS — R07.9 CHEST PAIN, UNSPECIFIED TYPE: Primary | ICD-10-CM

## 2021-01-12 DIAGNOSIS — R07.89 OTHER CHEST PAIN: ICD-10-CM

## 2021-01-12 DIAGNOSIS — E88.81 METABOLIC SYNDROME: ICD-10-CM

## 2021-01-12 DIAGNOSIS — Z72.89 ALCOHOL USE: ICD-10-CM

## 2021-01-12 LAB
CHOLEST SERPL-MCNC: 158 MG/DL (ref 50–200)
EST. AVERAGE GLUCOSE BLD GHB EST-MCNC: 117 MG/DL
HBA1C MFR BLD: 5.7 %
HDLC SERPL-MCNC: 51 MG/DL
LDLC SERPL CALC-MCNC: 90 MG/DL (ref 0–100)
NONHDLC SERPL-MCNC: 107 MG/DL
TRIGL SERPL-MCNC: 83 MG/DL

## 2021-01-12 PROCEDURE — 99214 OFFICE O/P EST MOD 30 MIN: CPT | Performed by: INTERNAL MEDICINE

## 2021-01-12 PROCEDURE — 3077F SYST BP >= 140 MM HG: CPT | Performed by: INTERNAL MEDICINE

## 2021-01-12 PROCEDURE — 3078F DIAST BP <80 MM HG: CPT | Performed by: INTERNAL MEDICINE

## 2021-01-12 PROCEDURE — 3044F HG A1C LEVEL LT 7.0%: CPT | Performed by: NURSE PRACTITIONER

## 2021-01-12 RX ORDER — AMLODIPINE BESYLATE 2.5 MG/1
2.5 TABLET ORAL DAILY
Qty: 90 TABLET | Refills: 1 | Status: SHIPPED | OUTPATIENT
Start: 2021-01-12 | End: 2021-01-13 | Stop reason: CLARIF

## 2021-01-12 RX ORDER — BLOOD SUGAR DIAGNOSTIC
STRIP MISCELLANEOUS
Qty: 100 EACH | Refills: 1 | Status: SHIPPED | OUTPATIENT
Start: 2021-01-12

## 2021-01-12 RX ORDER — METHOCARBAMOL 500 MG/1
500 TABLET, FILM COATED ORAL 3 TIMES DAILY PRN
Qty: 15 TABLET | Refills: 0 | Status: SHIPPED | OUTPATIENT
Start: 2021-01-12 | End: 2021-04-13 | Stop reason: ALTCHOICE

## 2021-01-12 RX ORDER — LANCETS
EACH MISCELLANEOUS
Qty: 100 EACH | Refills: 1 | Status: SHIPPED | OUTPATIENT
Start: 2021-01-12

## 2021-01-12 RX ORDER — ATORVASTATIN CALCIUM 20 MG/1
20 TABLET, FILM COATED ORAL DAILY
Qty: 90 TABLET | Refills: 1 | Status: SHIPPED | OUTPATIENT
Start: 2021-01-12 | End: 2021-03-09 | Stop reason: SDUPTHER

## 2021-01-12 RX ORDER — ATORVASTATIN CALCIUM 10 MG/1
20 TABLET, FILM COATED ORAL DAILY
Qty: 90 TABLET | Refills: 1 | Status: SHIPPED | OUTPATIENT
Start: 2021-01-12 | End: 2021-01-12

## 2021-01-12 RX ORDER — METFORMIN HYDROCHLORIDE 500 MG/1
500 TABLET, EXTENDED RELEASE ORAL
Qty: 90 TABLET | Refills: 1 | Status: SHIPPED | OUTPATIENT
Start: 2021-01-12 | End: 2021-03-09 | Stop reason: SDUPTHER

## 2021-01-12 NOTE — PROGRESS NOTES
INTERNAL MEDICINE TRANSITION OF CARE OFFICE VISIT  [facility] - [facility] INTERNAL MEDICINE OMI    NAME: Babara Opitz  AGE: 41 y o  SEX: male  : 1975     DATE: 2021     Assessment and Plan:     Problem List Items Addressed This Visit        Endocrine    Type 2 diabetes mellitus without complication, without long-term current use of insulin ([address] )       Lab Results   Component Value Date    HGBA1C 6 8 (H) 2020     Monitors BG everyday, highest number he's seen this month is 200  Plan:  · Repeat A1c, added on to hospital labs  · BG monitoring lauro provided, asked to measure BG levels 3 times a week rather than everyday  · Test strips and lancet refill provided  · Compliance to medication encouraged         Relevant Medications    metFORMIN (GLUCOPHAGE-XR) 500 mg 24 hr tablet    glucose blood (OneTouch Ultra) test strip    Lancets (onetouch ultrasoft) lancets    Other Relevant Orders    HEMOGLOBIN A1C W/ EAG ESTIMATION       Cardiovascular and Mediastinum    Essential hypertension     BP of 142/72  Wasn't taking medications regularly until last Tuesday    Plan:  · Amlodipine refill provided (90 days)  · Compliance encouraged         Relevant Medications    amLODIPine (NORVASC) 2 5 mg tablet       Other    Continuous dependence on cigarette smoking    Alcohol use     Drinks 3 drinks a day, averaging 12 drinks a week  Lifestyle modifications encouraged, contemplative regarding this  Aware he can reach out to us if he needs assistance with this  Metabolic syndrome    Relevant Medications    atorvastatin (LIPITOR) 10 mg tablet    Other Relevant Orders    Lipid panel (Completed)    Chest pain - Primary     Atypical chest pain  Likely to be noncardiac, MSK costochondritis/Tietze syndrome  Considering his social and PMH, ACS should be ruled out  ED work up including trop x 2, EKG, CXR were unremarkable  No JVD, LE edema, cardiac auscultation unremarkable  Plan:  · Compliance to meds encouraged  · ASCVD risk score near 20%, moderate intensity statin prescribed  · Stress Test ordered, will defer to cards if needs to be cancelled  · Lipid Panel, A1c  · Has an appointment with cards tomorrow at 5pm, encouraged to go  · To take robaxin PRN for chest pain  · Follow up in 1 month            Relevant Medications    methocarbamol (ROBAXIN) 500 mg tablet    Other Relevant Orders    Stress test only, exercise      Other Visit Diagnoses     Lymph node enlargement        Relevant Orders    CT abdomen pelvis wo contrast             HPI:     Mr Haris Fontanez is a 40 yo M coming to our office for follow up after ED visit for chest pain on 01/11/2020  PMH of Type 2 DM, HTN, is a smoker and drinks nearly 12 alcoholic drinks every week  Was exposed to [employer] and tested negative  ED workup was all negative and asked to follow up with PCP with Cards referral     States that had similar chest pain 1 week before his event yesterday, but resolved on its own  Yesterday while he Was standing by his work machine the pain started  Describes it as sharp that transitioned to pressure, constant, aggravated with deep breath in and by laying flat, did not try any medications but did "bang on his chest" which provided some relief  Reached a severity of 10/10, 40-45 minutes before calling 911 the pain started to worsen  Took 3 aspirins before arrival to ED  Associated w nausea, right leg and arm paresthesia, palpitations, and Anxiety  No chills, diaphoresis, fevers, cough  No recent falls or trauma to chest  No recent heavy lifting  No difficulty breathing, lightheadedness, presyncope, syncope  No new rashes  No association with food  No familial cardiac history  He had stopped taking all of his medications because thought he was well, and didn't need them any longer  After the first episode last Tuesday, he restarted his meds  Feels he now understands the importance of this      At  Present continues to have chest pain, able to pin-point under the left breast  5/10  With numbness of right side  Asking for refills  Declines flu vaccine again  The following portions of the patient's history were reviewed and updated as appropriate: allergies, current medications, past family history, past medical history, past social history, past surgical history and problem list      Review of Systems:     Review of Systems   Cardiovascular: Positive for chest pain and palpitations  All other systems reviewed and are negative  Problem List:     Patient Active Problem List   Diagnosis    Continuous dependence on cigarette smoking    Clubbing of nail    Primary insomnia    Nonintractable hemiplegic migraine    Alcohol use    Essential hypertension    Type 2 diabetes mellitus without complication, without long-term current use of insulin (HCC)    Metabolic syndrome    Exposure to SARS-associated coronavirus    Viral upper respiratory tract infection    Chest pain        Objective:     /72 (BP Location: Left arm, Patient Position: Sitting, Cuff Size: Standard)   Pulse 69   Temp 98 8 °F (37 1 °C)   Resp 20   Wt 86 6 kg (191 lb)   SpO2 97%   BMI 26 64 kg/m²     Physical Exam  Vitals signs and nursing note reviewed  Constitutional:       General: He is not in acute distress  Appearance: He is well-developed  He is not diaphoretic  HENT:      Head: Normocephalic and atraumatic  Eyes:      Extraocular Movements: Extraocular movements intact  Cardiovascular:      Rate and Rhythm: Normal rate and regular rhythm  Pulses: Normal pulses  Radial pulses are 2+ on the right side and 2+ on the left side  Heart sounds: Normal heart sounds  Pulmonary:      Effort: Pulmonary effort is normal       Breath sounds: Decreased breath sounds present  No wheezing, rhonchi or rales  Abdominal:      General: Bowel sounds are normal  There is no distension        Palpations: Abdomen is soft  Tenderness: There is no abdominal tenderness  Musculoskeletal:         General: No tenderness  Right lower leg: No edema  Left lower leg: No edema  Skin:     General: Skin is warm and dry  Neurological:      General: No focal deficit present  Mental Status: He is alert and oriented to person, place, and time  Mental status is at baseline  Sensory: Sensation is intact  Comments: Motor strength grossly 5/5  Psychiatric:         Mood and Affect: Mood normal          Behavior: Behavior normal          Thought Content: Thought content normal          Judgment: Judgment normal          Laboratory Results: I have personally reviewed the pertinent laboratory results/reports     Radiology/Other Diagnostic Testing Results: I have personally reviewed pertinent reports  Xr Chest 1 View Portable    Result Date: 1/11/2021  CHEST INDICATION:   Chest Pain  COMPARISON:  CT scan 9/1/2020 EXAM PERFORMED/VIEWS:  XR CHEST PORTABLE FINDINGS: Cardiomediastinal silhouette appears unremarkable  The lungs are clear  No pneumothorax or pleural effusion  Osseous structures appear within normal limits for patient age  No acute cardiopulmonary disease   Workstation performed: [id number]        Current Medications:     Outpatient Medications Prior to Visit   Medication Sig Dispense Refill    aspirin (ECOTRIN LOW STRENGTH) 81 mg EC tablet Take 1 tablet (81 mg total) by mouth daily 90 tablet 2    amLODIPine (NORVASC) 2 5 mg tablet Take 1 tablet (2 5 mg total) by mouth daily 30 tablet 3    metFORMIN (GLUCOPHAGE-XR) 500 mg 24 hr tablet Take 1 tablet (500 mg total) by mouth daily with lunch 30 tablet 2    Blood Glucose Monitoring Suppl (ONE TOUCH ULTRA 2) w/Device KIT by Does not apply route 3 (three) times a day (Patient not taking: Reported on 1/11/2021) 1 each 0    nicotine (NICODERM CQ) 14 mg/24hr TD 24 hr patch Place 1 patch on the skin every 24 hours (Patient not taking: Reported on 10/26/2020) 14 patch 0    nicotine (NICODERM CQ) 7 mg/24hr TD 24 hr patch Place 1 patch on the skin every 24 hours (Patient not taking: Reported on 10/26/2020) 7 patch 0    glucose blood (OneTouch Ultra) test strip Use as instructed (Patient not taking: Reported on 1/11/2021) 100 each 1    Lancets (onetouch ultrasoft) lancets Use as instructed (Patient not taking: Reported on 1/11/2021) 100 each 1     No facility-administered medications prior to visit          Lisa Duggan MD  [facility] INTERNAL MEDICINE [place]

## 2021-01-12 NOTE — ASSESSMENT & PLAN NOTE
Lab Results   Component Value Date    HGBA1C 6 8 (H) 09/02/2020     Monitors BG everyday, highest number he's seen this month is 200  Plan:  · Repeat A1c, added on to hospital labs  · BG monitoring lauro provided, asked to measure BG levels 3 times a week rather than everyday    · Test strips and lancet refill provided  · Compliance to medication encouraged

## 2021-01-12 NOTE — ASSESSMENT & PLAN NOTE
Atypical chest pain  Likely to be noncardiac, MSK costochondritis/Tietze syndrome  Considering his social and PMH, ACS should be ruled out  ED work up including trop x 2, EKG, CXR were unremarkable  No JVD, LE edema, cardiac auscultation unremarkable  Plan:  · Compliance to meds encouraged  · ASCVD risk score near 20%, moderate intensity statin prescribed  · Stress Test ordered, will defer to cards if needs to be cancelled  · Lipid Panel, A1c  · Has an appointment with cards tomorrow at 5pm, encouraged to go  · To take robaxin PRN for chest pain

## 2021-01-12 NOTE — ASSESSMENT & PLAN NOTE
Atypical chest pain  Likely to be noncardiac, MSK costochondritis/Tietze syndrome  Considering his social and PMH, ACS should be ruled out  ED work up including trop x 2, EKG, CXR were unremarkable  No JVD, LE edema, cardiac auscultation unremarkable  Plan:  · Compliance to meds encouraged  · ASCVD risk score near 20%, moderate intensity statin prescribed  · Stress Test ordered, will defer to cards if needs to be cancelled  · Lipid Panel, A1c  · Has an appointment with cards tomorrow at 5pm, encouraged to go  · To take robaxin PRN for chest pain    · Follow up in 1 month

## 2021-01-12 NOTE — ASSESSMENT & PLAN NOTE
Drinks 3 drinks a day, averaging 12 drinks a week  Lifestyle modifications encouraged, contemplative regarding this  Aware he can reach out to us if he needs assistance with this

## 2021-01-12 NOTE — ASSESSMENT & PLAN NOTE
BP of 142/72  Wasn't taking medications regularly until last Tuesday    Plan:  · Amlodipine refill provided (90 days)  · Compliance encouraged

## 2021-01-13 ENCOUNTER — OFFICE VISIT (OUTPATIENT)
Dept: CARDIOLOGY CLINIC | Facility: CLINIC | Age: 46
End: 2021-01-13
Payer: COMMERCIAL

## 2021-01-13 VITALS
SYSTOLIC BLOOD PRESSURE: 150 MMHG | DIASTOLIC BLOOD PRESSURE: 88 MMHG | BODY MASS INDEX: 26.56 KG/M2 | WEIGHT: 189.7 LBS | OXYGEN SATURATION: 97 % | HEIGHT: 71 IN | HEART RATE: 74 BPM

## 2021-01-13 DIAGNOSIS — E11.9 TYPE 2 DIABETES MELLITUS WITHOUT COMPLICATION, WITHOUT LONG-TERM CURRENT USE OF INSULIN (HCC): ICD-10-CM

## 2021-01-13 DIAGNOSIS — I10 HYPERTENSION, UNSPECIFIED TYPE: ICD-10-CM

## 2021-01-13 DIAGNOSIS — Z72.0 TOBACCO ABUSE: ICD-10-CM

## 2021-01-13 DIAGNOSIS — R07.9 CHEST PAIN, UNSPECIFIED TYPE: Primary | ICD-10-CM

## 2021-01-13 DIAGNOSIS — E78.5 HYPERLIPIDEMIA, UNSPECIFIED HYPERLIPIDEMIA TYPE: ICD-10-CM

## 2021-01-13 PROCEDURE — 4004F PT TOBACCO SCREEN RCVD TLK: CPT | Performed by: NURSE PRACTITIONER

## 2021-01-13 PROCEDURE — 99243 OFF/OP CNSLTJ NEW/EST LOW 30: CPT | Performed by: NURSE PRACTITIONER

## 2021-01-13 PROCEDURE — 3008F BODY MASS INDEX DOCD: CPT | Performed by: NURSE PRACTITIONER

## 2021-01-13 RX ORDER — AMLODIPINE BESYLATE 5 MG/1
5 TABLET ORAL DAILY
Qty: 30 TABLET | Refills: 3 | Status: SHIPPED | OUTPATIENT
Start: 2021-01-13 | End: 2021-03-09 | Stop reason: SDUPTHER

## 2021-01-13 RX ORDER — AMLODIPINE BESYLATE 5 MG/1
5 TABLET ORAL DAILY
Qty: 30 TABLET | Refills: 3 | Status: SHIPPED | OUTPATIENT
Start: 2021-01-13 | End: 2021-01-13 | Stop reason: SDUPTHER

## 2021-01-13 NOTE — PROGRESS NOTES
Cardiology Consultation     Izabela Castaneda  [phone]  1975  HEART & VASCULAR [facility] [place]  [address] [facility] [id number]    Chest pain      On 1/11/21 Mr Blaire Recinos presented to [place] [address] Emergency room with chest pain  He developed chest pain with right sided numbness at 3 am while at work  CP was associated with shortness of breath, right leg numbness/tingling which improved after taking ASA  Troponin <0 02  EKG showed NSR without ST T wave changes  He was discharged home  Mr Blaire Recinos presents to our office for a cardiology consultation for chest pain  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  He has not experienced chest pain since discharge from the ED  Mr Nara Freitas smokes one pack of cigarettes a day, drinks 2 wiskey and soda 2 drinks a day          HPI:  Hypertension  Hyperlipidemia 1/11/21 , TG 83, HDL 51, LDL 90  DM2 HgbA1C 5 7   Tobacco abuse one pack a day  + ETOH drinking wiskey and soda 2 drinks a day  Patient Active Problem List   Diagnosis    Continuous dependence on cigarette smoking    Clubbing of nail    Primary insomnia    Nonintractable hemiplegic migraine    Alcohol use    Essential hypertension    Type 2 diabetes mellitus without complication, without long-term current use of insulin (HCC)    Metabolic syndrome    Exposure to SARS-associated coronavirus    Viral upper respiratory tract infection    Chest pain     Past Medical History:   Diagnosis Date    [facility]     Hypertension      Social History     Socioeconomic History    Marital status: /Civil Union     Spouse name: Not on file    Number of children: 3    Years of education: Not on file    Highest education level: High school graduate   Occupational History    Not on file   Social Needs    Financial resource strain: Not very hard    Food insecurity     Worry: Never true Inability: Never true    Transportation needs     Medical: No     Non-medical: No   Tobacco Use    Smoking status: Current Every Day Smoker     Packs/day: 1 00     Years: 25 00     Pack years: 25 00     Types: Cigarettes     Start date: 2/18/1995    Smokeless tobacco: Never Used   Substance and Sexual Activity    Alcohol use: Yes     Frequency: 2-3 times a week     Drinks per session: 1 or 2     Binge frequency: Never    Drug use: Never    Sexual activity: Not Currently     Partners: Female     Comment: not concerned    Lifestyle    Physical activity     Days per week: 0 days     Minutes per session: 0 min    Stress: Not at all   Relationships    Social connections     Talks on phone: More than three times a week     Gets together: Once a week     Attends Orthodox service: Never     Active member of club or organization: No     Attends meetings of clubs or organizations: Never     Relationship status:     Intimate partner violence     Fear of current or ex partner: No     Emotionally abused: No     Physically abused: No     Forced sexual activity: No   Other Topics Concern    Not on file   Social History Narrative    Not on file      Family History   Problem Relation Age of Onset    Diabetes Mother     Diabetes Father      No past surgical history on file      Current Outpatient Medications:     amLODIPine (NORVASC) 2 5 mg tablet, Take 1 tablet (2 5 mg total) by mouth daily, Disp: 90 tablet, Rfl: 1    aspirin (ECOTRIN LOW STRENGTH) 81 mg EC tablet, Take 1 tablet (81 mg total) by mouth daily, Disp: 90 tablet, Rfl: 2    atorvastatin (LIPITOR) 20 mg tablet, Take 1 tablet (20 mg total) by mouth daily, Disp: 90 tablet, Rfl: 1    Blood Glucose Monitoring Suppl (ONE TOUCH ULTRA 2) w/Device KIT, by Does not apply route 3 (three) times a day (Patient not taking: Reported on 1/11/2021), Disp: 1 each, Rfl: 0    glucose blood (OneTouch Ultra) test strip, Use as instructed, Disp: 100 each, Rfl: 1   Lancets (onetouch ultrasoft) lancets, Use as instructed, Disp: 100 each, Rfl: 1    metFORMIN (GLUCOPHAGE-XR) 500 mg 24 hr tablet, Take 1 tablet (500 mg total) by mouth daily with lunch, Disp: 90 tablet, Rfl: 1    methocarbamol (ROBAXIN) 500 mg tablet, Take 1 tablet (500 mg total) by mouth 3 (three) times a day as needed for muscle spasms (Chest pain), Disp: 15 tablet, Rfl: 0    nicotine (NICODERM CQ) 14 mg/24hr TD 24 hr patch, Place 1 patch on the skin every 24 hours (Patient not taking: Reported on 10/26/2020), Disp: 14 patch, Rfl: 0    nicotine (NICODERM CQ) 7 mg/24hr TD 24 hr patch, Place 1 patch on the skin every 24 hours (Patient not taking: Reported on 10/26/2020), Disp: 7 patch, Rfl: 0  No Known Allergies  There were no vitals filed for this visit      Labs:  Admission on 01/11/2021, Discharged on 01/11/2021   Component Date Value    WBC 01/11/2021 8 50     RBC 01/11/2021 4 79     Hemoglobin 01/11/2021 12 9     Hematocrit 01/11/2021 40 7     MCV 01/11/2021 85     MCH 01/11/2021 26 9     MCHC 01/11/2021 31 7     RDW 01/11/2021 15 0     MPV 01/11/2021 10 4     Platelets 14/58/3153 256     nRBC 01/11/2021 0     Neutrophils Relative 01/11/2021 78*    Immat GRANS % 01/11/2021 1     Lymphocytes Relative 01/11/2021 13*    Monocytes Relative 01/11/2021 7     Eosinophils Relative 01/11/2021 0     Basophils Relative 01/11/2021 1     Neutrophils Absolute 01/11/2021 6 70     Immature Grans Absolute 01/11/2021 0 05     Lymphocytes Absolute 01/11/2021 1 07     Monocytes Absolute 01/11/2021 0 61     Eosinophils Absolute 01/11/2021 0 02     Basophils Absolute 01/11/2021 0 05     Sodium 01/11/2021 138     Potassium 01/11/2021 4 5     Chloride 01/11/2021 105     CO2 01/11/2021 28     ANION GAP 01/11/2021 5     BUN 01/11/2021 10     Creatinine 01/11/2021 0 92     Glucose 01/11/2021 111     Calcium 01/11/2021 8 8     AST 01/11/2021 24     ALT 01/11/2021 49     Alkaline Phosphatase 01/11/2021 75     Total Protein 01/11/2021 7 9     Albumin 01/11/2021 4 1     Total Bilirubin 01/11/2021 0 23     eGFR 01/11/2021 117     D-Dimer, Quant 01/11/2021 <0 27     Troponin I 01/11/2021 <0 02     Ventricular Rate 01/11/2021 77     Atrial Rate 01/11/2021 77     KS Interval 01/11/2021 144     QRSD Interval 01/11/2021 86     QT Interval 01/11/2021 368     QTC Interval 01/11/2021 416     P Axis 01/11/2021 33     QRS Axis 01/11/2021 68     T Wave Axis 01/11/2021 21     Troponin I 01/11/2021 <0 02     Hemoglobin A1C 01/11/2021 5 7*    EAG 01/11/2021 117     Cholesterol 01/11/2021 158     Triglycerides 01/11/2021 83     HDL, Direct 01/11/2021 51     LDL Calculated 01/11/2021 90     Non-HDL-Chol (CHOL-HDL) 01/11/2021 107      Imaging: Xr Chest 1 View Portable    Result Date: 1/11/2021  Narrative: CHEST INDICATION:   Chest Pain  COMPARISON:  CT scan 9/1/2020 EXAM PERFORMED/VIEWS:  XR CHEST PORTABLE FINDINGS: Cardiomediastinal silhouette appears unremarkable  The lungs are clear  No pneumothorax or pleural effusion  Osseous structures appear within normal limits for patient age  Impression: No acute cardiopulmonary disease  Workstation performed: [id number]       Review of Systems:  Review of Systems   Cardiovascular: Positive for chest pain  All other systems reviewed and are negative  Physical Exam:  Physical Exam  Vitals signs reviewed  Constitutional:       Appearance: Normal appearance  HENT:      Head: Normocephalic  Eyes:      Pupils: Pupils are equal, round, and reactive to light  Neck:      Musculoskeletal: Normal range of motion and neck supple  Cardiovascular:      Rate and Rhythm: Normal rate and regular rhythm  Pulses: Normal pulses  Heart sounds: Normal heart sounds  Pulmonary:      Effort: Pulmonary effort is normal       Breath sounds: Normal breath sounds  Abdominal:      General: Bowel sounds are normal       Palpations: Abdomen is soft  Musculoskeletal: Normal range of motion  Right lower leg: No edema  Left lower leg: No edema  Skin:     General: Skin is warm and dry  Capillary Refill: Capillary refill takes less than 2 seconds  Neurological:      General: No focal deficit present  Mental Status: He is alert and oriented to person, place, and time  Psychiatric:         Mood and Affect: Mood normal          Behavior: Behavior normal          Discussion/Summary:  1  Chest pain- Risk factors include HTN, HLD, DM2, ETOH and Tobacco abuse  Mr Izzy Dixon PCP has ordered an exercise stress test R/O ischemia  Continue on aspirin 81 mg daily  Increase amlodipine from 2 5 mg daily to 5 mg daily  2  Hypertension- RUE sitting 146/80, increase Norvasc from 2 5mg daily to 5mg daily, 2gm sodium diet   3  Hyperlipidemia 1/11/21 , TG 83, HDL 51, LDL 90- continue on Lipitor 20mg daily, Hand out information provided on a heart healthy diet  4  DM2 HgbA1C 5 7 - continue on MetFormin 500mg daily   5  Tobacco abuse- counseled on smoking cessation  6  ETOH drinking 2 whiskey and soda a night- counseled on abstaining from ETOH use

## 2021-01-13 NOTE — PATIENT INSTRUCTIONS
2gm sodium low fat low cholesterol diet,     Stop smoking  Stop consuming alcohol   Increase Amlodipine to 5mg daily

## 2021-03-09 ENCOUNTER — OFFICE VISIT (OUTPATIENT)
Dept: INTERNAL MEDICINE CLINIC | Facility: CLINIC | Age: 46
End: 2021-03-09
Payer: COMMERCIAL

## 2021-03-09 VITALS
HEART RATE: 75 BPM | BODY MASS INDEX: 26.05 KG/M2 | TEMPERATURE: 98.8 F | SYSTOLIC BLOOD PRESSURE: 142 MMHG | DIASTOLIC BLOOD PRESSURE: 78 MMHG | WEIGHT: 186.8 LBS | OXYGEN SATURATION: 98 %

## 2021-03-09 DIAGNOSIS — I10 HYPERTENSION, UNSPECIFIED TYPE: ICD-10-CM

## 2021-03-09 DIAGNOSIS — I10 ESSENTIAL HYPERTENSION: ICD-10-CM

## 2021-03-09 DIAGNOSIS — R00.2 PALPITATIONS: Primary | ICD-10-CM

## 2021-03-09 DIAGNOSIS — E88.81 METABOLIC SYNDROME: ICD-10-CM

## 2021-03-09 DIAGNOSIS — E11.9 TYPE 2 DIABETES MELLITUS WITHOUT COMPLICATION, WITHOUT LONG-TERM CURRENT USE OF INSULIN (HCC): ICD-10-CM

## 2021-03-09 DIAGNOSIS — R42 LIGHTHEADEDNESS: ICD-10-CM

## 2021-03-09 PROBLEM — R07.9 CHEST PAIN: Status: RESOLVED | Noted: 2021-01-12 | Resolved: 2021-03-09

## 2021-03-09 PROBLEM — Z20.828 EXPOSURE TO SARS-ASSOCIATED CORONAVIRUS: Status: RESOLVED | Noted: 2020-10-05 | Resolved: 2021-03-09

## 2021-03-09 PROBLEM — J06.9 VIRAL UPPER RESPIRATORY TRACT INFECTION: Status: RESOLVED | Noted: 2020-10-07 | Resolved: 2021-03-09

## 2021-03-09 PROCEDURE — 3725F SCREEN DEPRESSION PERFORMED: CPT | Performed by: INTERNAL MEDICINE

## 2021-03-09 PROCEDURE — 99213 OFFICE O/P EST LOW 20 MIN: CPT | Performed by: INTERNAL MEDICINE

## 2021-03-09 RX ORDER — ASPIRIN 81 MG/1
81 TABLET ORAL DAILY
Qty: 90 TABLET | Refills: 2 | Status: SHIPPED | OUTPATIENT
Start: 2021-03-09 | End: 2022-06-13 | Stop reason: SDUPTHER

## 2021-03-09 RX ORDER — ATORVASTATIN CALCIUM 20 MG/1
20 TABLET, FILM COATED ORAL DAILY
Qty: 90 TABLET | Refills: 3 | Status: SHIPPED | OUTPATIENT
Start: 2021-03-09 | End: 2022-06-13 | Stop reason: SDUPTHER

## 2021-03-09 RX ORDER — METFORMIN HYDROCHLORIDE 500 MG/1
500 TABLET, EXTENDED RELEASE ORAL
Qty: 90 TABLET | Refills: 3 | Status: SHIPPED | OUTPATIENT
Start: 2021-03-09 | End: 2022-06-13 | Stop reason: SDUPTHER

## 2021-03-09 RX ORDER — AMLODIPINE BESYLATE 5 MG/1
5 TABLET ORAL DAILY
Qty: 30 TABLET | Refills: 5 | Status: SHIPPED | OUTPATIENT
Start: 2021-03-09 | End: 2021-04-13
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
## 2021-03-09 NOTE — ASSESSMENT & PLAN NOTE
Patient reports symptoms of lightheadedness, SOB, left arm tingling and palpitations since September 2020  No precipitating factors  Last time it occurred was this Monday at work  Plan:  · Need to rule out cardiac etiology  He has many risk factors including diabetes, hypertension, and tobacco abuse    · Recommended to get exercise stress test done  · Echocardiogram to look for structural abnormalities  · Follow-up with cardiology  · Will defer Holter monitor to Cardiology  · Follow-up in 1 month   · Advised to go to the ED if any worsening of symptoms
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
## 2021-03-10 ENCOUNTER — TELEPHONE (OUTPATIENT)
Dept: INTERNAL MEDICINE CLINIC | Facility: CLINIC | Age: 46
End: 2021-03-10

## 2021-03-13 ENCOUNTER — HOSPITAL ENCOUNTER (EMERGENCY)
Facility: HOSPITAL | Age: 46
Discharge: HOME | End: 2021-03-13
Attending: EMERGENCY MEDICINE
Payer: COMMERCIAL

## 2021-03-13 ENCOUNTER — APPOINTMENT (EMERGENCY)
Dept: RADIOLOGY | Facility: HOSPITAL | Age: 46
End: 2021-03-13
Payer: COMMERCIAL

## 2021-03-13 VITALS
WEIGHT: 188 LBS | DIASTOLIC BLOOD PRESSURE: 74 MMHG | OXYGEN SATURATION: 100 % | TEMPERATURE: 98.3 F | BODY MASS INDEX: 26.22 KG/M2 | RESPIRATION RATE: 13 BRPM | HEART RATE: 52 BPM | SYSTOLIC BLOOD PRESSURE: 122 MMHG

## 2021-03-13 DIAGNOSIS — R07.9 CHEST PAIN: Primary | ICD-10-CM

## 2021-03-13 DIAGNOSIS — R20.2 PARESTHESIAS: ICD-10-CM

## 2021-03-13 LAB
ALBUMIN SERPL BCP-MCNC: 4.3 G/DL (ref 3.5–5)
ALP SERPL-CCNC: 88 U/L (ref 46–116)
ALT SERPL W P-5'-P-CCNC: 72 U/L (ref 12–78)
AMPHETAMINES SERPL QL SCN: NEGATIVE
ANION GAP SERPL CALCULATED.3IONS-SCNC: 4 MMOL/L (ref 4–13)
AST SERPL W P-5'-P-CCNC: 24 U/L (ref 5–45)
BARBITURATES UR QL: NEGATIVE
BASOPHILS # BLD AUTO: 0.06 THOUSANDS/ΜL (ref 0–0.1)
BASOPHILS NFR BLD AUTO: 1 % (ref 0–1)
BENZODIAZ UR QL: NEGATIVE
BILIRUB SERPL-MCNC: 0.4 MG/DL (ref 0.2–1)
BILIRUB UR QL STRIP: NEGATIVE
BUN SERPL-MCNC: 11 MG/DL (ref 5–25)
CALCIUM SERPL-MCNC: 9.1 MG/DL (ref 8.3–10.1)
CHLORIDE SERPL-SCNC: 102 MMOL/L (ref 100–108)
CLARITY UR: CLEAR
CO2 SERPL-SCNC: 32 MMOL/L (ref 21–32)
COCAINE UR QL: NEGATIVE
COLOR UR: YELLOW
CREAT SERPL-MCNC: 0.95 MG/DL (ref 0.6–1.3)
EOSINOPHIL # BLD AUTO: 0.09 THOUSAND/ΜL (ref 0–0.61)
EOSINOPHIL NFR BLD AUTO: 1 % (ref 0–6)
ERYTHROCYTE [DISTWIDTH] IN BLOOD BY AUTOMATED COUNT: 14.3 % (ref 11.6–15.1)
GFR SERPL CREATININE-BSD FRML MDRD: 112 ML/MIN/1.73SQ M
GLUCOSE SERPL-MCNC: 107 MG/DL (ref 65–140)
GLUCOSE SERPL-MCNC: 80 MG/DL (ref 65–140)
GLUCOSE UR STRIP-MCNC: NEGATIVE MG/DL
HCT VFR BLD AUTO: 42.5 % (ref 36.5–49.3)
HGB BLD-MCNC: 13.2 G/DL (ref 12–17)
HGB UR QL STRIP.AUTO: NEGATIVE
IMM GRANULOCYTES # BLD AUTO: 0.04 THOUSAND/UL (ref 0–0.2)
IMM GRANULOCYTES NFR BLD AUTO: 0 % (ref 0–2)
KETONES UR STRIP-MCNC: NEGATIVE MG/DL
LEUKOCYTE ESTERASE UR QL STRIP: NEGATIVE
LYMPHOCYTES # BLD AUTO: 2.17 THOUSANDS/ΜL (ref 0.6–4.47)
LYMPHOCYTES NFR BLD AUTO: 24 % (ref 14–44)
MAGNESIUM SERPL-MCNC: 1.9 MG/DL (ref 1.6–2.6)
MCH RBC QN AUTO: 26.4 PG (ref 26.8–34.3)
MCHC RBC AUTO-ENTMCNC: 31.1 G/DL (ref 31.4–37.4)
MCV RBC AUTO: 85 FL (ref 82–98)
METHADONE UR QL: NEGATIVE
MONOCYTES # BLD AUTO: 0.79 THOUSAND/ΜL (ref 0.17–1.22)
MONOCYTES NFR BLD AUTO: 9 % (ref 4–12)
NEUTROPHILS # BLD AUTO: 5.9 THOUSANDS/ΜL (ref 1.85–7.62)
NEUTS SEG NFR BLD AUTO: 65 % (ref 43–75)
NITRITE UR QL STRIP: NEGATIVE
NRBC BLD AUTO-RTO: 0 /100 WBCS
OPIATES UR QL SCN: NEGATIVE
OXYCODONE+OXYMORPHONE UR QL SCN: NEGATIVE
PCP UR QL: NEGATIVE
PH UR STRIP.AUTO: 7.5 [PH]
PLATELET # BLD AUTO: 254 THOUSANDS/UL (ref 149–390)
PMV BLD AUTO: 9.8 FL (ref 8.9–12.7)
POTASSIUM SERPL-SCNC: 4.5 MMOL/L (ref 3.5–5.3)
PROT SERPL-MCNC: 8.3 G/DL (ref 6.4–8.2)
PROT UR STRIP-MCNC: NEGATIVE MG/DL
RBC # BLD AUTO: 5 MILLION/UL (ref 3.88–5.62)
SODIUM SERPL-SCNC: 138 MMOL/L (ref 136–145)
SP GR UR STRIP.AUTO: <=1.005 (ref 1–1.03)
THC UR QL: NEGATIVE
TROPONIN I SERPL-MCNC: <0.02 NG/ML
TROPONIN I SERPL-MCNC: <0.02 NG/ML
UROBILINOGEN UR QL STRIP.AUTO: 0.2 E.U./DL
WBC # BLD AUTO: 9.05 THOUSAND/UL (ref 4.31–10.16)

## 2021-03-13 PROCEDURE — 71045 X-RAY EXAM CHEST 1 VIEW: CPT

## 2021-03-13 PROCEDURE — 80053 COMPREHEN METABOLIC PANEL: CPT | Performed by: EMERGENCY MEDICINE

## 2021-03-13 PROCEDURE — 36415 COLL VENOUS BLD VENIPUNCTURE: CPT | Performed by: EMERGENCY MEDICINE

## 2021-03-13 PROCEDURE — 70496 CT ANGIOGRAPHY HEAD: CPT

## 2021-03-13 PROCEDURE — 84484 ASSAY OF TROPONIN QUANT: CPT | Performed by: EMERGENCY MEDICINE

## 2021-03-13 PROCEDURE — 93005 ELECTROCARDIOGRAM TRACING: CPT

## 2021-03-13 PROCEDURE — 99285 EMERGENCY DEPT VISIT HI MDM: CPT | Performed by: EMERGENCY MEDICINE

## 2021-03-13 PROCEDURE — 83735 ASSAY OF MAGNESIUM: CPT | Performed by: EMERGENCY MEDICINE

## 2021-03-13 PROCEDURE — 85025 COMPLETE CBC W/AUTO DIFF WBC: CPT | Performed by: EMERGENCY MEDICINE

## 2021-03-13 PROCEDURE — 99285 EMERGENCY DEPT VISIT HI MDM: CPT

## 2021-03-13 PROCEDURE — 80307 DRUG TEST PRSMV CHEM ANLYZR: CPT | Performed by: EMERGENCY MEDICINE

## 2021-03-13 PROCEDURE — 70498 CT ANGIOGRAPHY NECK: CPT

## 2021-03-13 PROCEDURE — 82948 REAGENT STRIP/BLOOD GLUCOSE: CPT

## 2021-03-13 PROCEDURE — 96374 THER/PROPH/DIAG INJ IV PUSH: CPT

## 2021-03-13 PROCEDURE — G1004 CDSM NDSC: HCPCS

## 2021-03-13 PROCEDURE — 81003 URINALYSIS AUTO W/O SCOPE: CPT | Performed by: EMERGENCY MEDICINE

## 2021-03-13 RX ORDER — KETOROLAC TROMETHAMINE 30 MG/ML
15 INJECTION, SOLUTION INTRAMUSCULAR; INTRAVENOUS ONCE
Status: COMPLETED | OUTPATIENT
Start: 2021-03-13 | End: 2021-03-13

## 2021-03-13 RX ADMIN — IOHEXOL 85 ML: 350 INJECTION, SOLUTION INTRAVENOUS at 14:24

## 2021-03-13 RX ADMIN — KETOROLAC TROMETHAMINE 15 MG: 30 INJECTION, SOLUTION INTRAMUSCULAR at 15:52

## 2021-03-13 NOTE — Clinical Note
Evens Fox was seen and treated in our emergency department on 3/13/2021  Diagnosis:     Lakshmi  may return to work on return date  He may return on this date: 03/15/2021         If you have any questions or concerns, please don't hesitate to call        Joanne Most, DO    ______________________________           _______________          _______________  Hospital Representative                              Date                                Time

## 2021-03-13 NOTE — ED PROVIDER NOTES
History  Chief Complaint   Patient presents with    Chest Pain     States chest pain and trouble breathing for 3 days  States seen by his PCP on 3/9 for his diabetes  EKG done in triage at 1246    Numbness     States having dizziness and lightheadedness today and the left side of his body is numb today, physician in triage to evaluate  Patient had a stroke alert in September for right sided numbness  FSBS 80 in triage and physician is present  Pt in the ER with c/o chest pain and difficulty breathing x 3 days  Pt also with c/o "spidey senses" feeling, similar to what he has felt since Sept 2020 - for the past 3 days  Pt also with c/o "numbness" to his left upper and lower extremities - which he further clarifies as tingling/paresthesias  Pt has previously been informed that his symptoms were secondary to a migraine  He denies a headache at this time  Pt is anxious      History provided by:  Patient   used: No    Chest Pain  Pain location:  Substernal area  Pain quality: aching    Pain radiates to:  Does not radiate  Pain radiates to the back: no    Pain severity:  Mild  Onset quality:  Sudden  Timing:  Intermittent  Progression:  Waxing and waning  Chronicity:  New  Relieved by:  None tried  Worsened by:  Nothing tried  Ineffective treatments:  None tried  Associated symptoms: no abdominal pain, no back pain, no cough, no dizziness, no dysphagia, no fever, no headache, no nausea, no palpitations, no shortness of breath and not vomiting        Prior to Admission Medications   Prescriptions Last Dose Informant Patient Reported? Taking?    Blood Glucose Monitoring Suppl (ONE TOUCH ULTRA 2) w/Device KIT  Self No No   Sig: by Does not apply route 3 (three) times a day   Blood Pressure Monitoring (Blood Pressure Monitor/M Cuff) MISC   No No   Sig: Use 2 (two) times a day   Lancets (onetouch ultrasoft) lancets  Self No No   Sig: Use as instructed   amLODIPine (NORVASC) 5 mg tablet   No No   Sig: Take 1 tablet (5 mg total) by mouth daily   aspirin (ECOTRIN LOW STRENGTH) 81 mg EC tablet   No No   Sig: Take 1 tablet (81 mg total) by mouth daily   atorvastatin (LIPITOR) 20 mg tablet   No No   Sig: Take 1 tablet (20 mg total) by mouth daily   glucose blood (OneTouch Ultra) test strip  Self No No   Sig: Use as instructed   metFORMIN (GLUCOPHAGE-XR) 500 mg 24 hr tablet   No No   Sig: Take 1 tablet (500 mg total) by mouth daily with lunch   methocarbamol (ROBAXIN) 500 mg tablet  Self No No   Sig: Take 1 tablet (500 mg total) by mouth 3 (three) times a day as needed for muscle spasms (Chest pain)   nicotine (NICODERM CQ) 14 mg/24hr TD 24 hr patch  Self No No   Sig: Place 1 patch on the skin every 24 hours   Patient not taking: Reported on 10/26/2020   nicotine (NICODERM CQ) 7 mg/24hr TD 24 hr patch  Self No No   Sig: Place 1 patch on the skin every 24 hours   Patient not taking: Reported on 10/26/2020      Facility-Administered Medications: None       Past Medical History:   Diagnosis Date    Chest pain 1/12/2021    Diabetes mellitus ([address] )     Exposure to SARS-associated coronavirus 10/5/2020    Hypertension     Viral upper respiratory tract infection 10/7/2020       History reviewed  No pertinent surgical history  Family History   Problem Relation Age of Onset    Diabetes Mother     Diabetes Father      I have reviewed and agree with the history as documented  E-Cigarette/Vaping    E-Cigarette Use Never User      E-Cigarette/Vaping Substances     Social History     Tobacco Use    Smoking status: Current Every Day Smoker     Packs/day: 1 00     Years: 25 00     Pack years: 25 00     Types: Cigarettes     Start date: 2/18/1995    Smokeless tobacco: Never Used   Substance Use Topics    Alcohol use: Yes     Frequency: 2-3 times a week     Drinks per session: 1 or 2     Binge frequency: Never    Drug use: Never       Review of Systems   Constitutional: Negative for chills and fever     HENT: Negative for facial swelling and trouble swallowing  Eyes: Negative for photophobia, pain and redness  Respiratory: Negative for cough, shortness of breath and wheezing  Cardiovascular: Positive for chest pain  Negative for palpitations  Gastrointestinal: Negative for abdominal pain, constipation, diarrhea, nausea and vomiting  Genitourinary: Negative for dysuria, flank pain, hematuria and urgency  Musculoskeletal: Negative for back pain  Skin: Negative for color change and rash  Neurological: Negative for dizziness, facial asymmetry and headaches  Psychiatric/Behavioral: Negative for agitation and confusion  The patient is nervous/anxious  All other systems reviewed and are negative  Physical Exam  Physical Exam  Vitals signs and nursing note reviewed  Constitutional:       Appearance: He is well-developed  HENT:      Head: Normocephalic and atraumatic  Eyes:      Pupils: Pupils are equal, round, and reactive to light  Cardiovascular:      Rate and Rhythm: Normal rate and regular rhythm  Heart sounds: Normal heart sounds  Pulmonary:      Effort: Pulmonary effort is normal       Breath sounds: Normal breath sounds  Abdominal:      General: Bowel sounds are normal  There is no distension  Palpations: Abdomen is soft  There is no mass  Tenderness: There is no abdominal tenderness  There is no guarding or rebound  Skin:     General: Skin is warm and dry  Capillary Refill: Capillary refill takes less than 2 seconds  Neurological:      General: No focal deficit present  Mental Status: He is alert and oriented to person, place, and time  Psychiatric:         Mood and Affect: Mood is anxious  Behavior: Behavior normal          Thought Content:  Thought content normal          Judgment: Judgment normal          Vital Signs  ED Triage Vitals   Temperature Pulse Respirations Blood Pressure SpO2   03/13/21 1249 03/13/21 1249 03/13/21 1249 03/13/21 1249 03/13/21 1249   98 3 °F (36 8 °C) 61 20 141/82 99 %      Temp Source Heart Rate Source Patient Position - Orthostatic VS BP Location FiO2 (%)   03/13/21 1249 03/13/21 1249 03/13/21 1249 03/13/21 1249 --   Tympanic Monitor Sitting Right arm       Pain Score       03/13/21 1552       6           Vitals:    03/13/21 1415 03/13/21 1500 03/13/21 1530 03/13/21 1600   BP: 126/75 126/69 127/68 122/74   Pulse: (!) 54 (!) 52 (!) 52 (!) 52   Patient Position - Orthostatic VS:             Visual Acuity      ED Medications  Medications   iohexol (OMNIPAQUE) 350 MG/ML injection (SINGLE-DOSE) 85 mL (85 mL Intravenous Given 3/13/21 1424)   ketorolac (TORADOL) injection 15 mg (15 mg Intravenous Given 3/13/21 1552)       Diagnostic Studies  Results Reviewed     Procedure Component Value Units Date/Time    Rapid drug screen, urine [[id number]]  (Normal) Collected: 03/13/21 1631    Lab Status: Final result Specimen: Urine, Clean Catch Updated: 03/13/21 1655     Amph/Meth UR Negative     Barbiturate Ur Negative     Benzodiazepine Urine Negative     Cocaine Urine Negative     Methadone Urine Negative     Opiate Urine Negative     PCP Ur Negative     THC Urine Negative     Oxycodone Urine Negative    Narrative:      FOR MEDICAL PURPOSES ONLY  IF CONFIRMATION NEEDED PLEASE CONTACT THE LAB WITHIN 5 DAYS      Drug Screen Cutoff Levels:  AMPHETAMINE/METHAMPHETAMINES  1000 ng/mL  BARBITURATES     200 ng/mL  BENZODIAZEPINES     200 ng/mL  COCAINE      300 ng/mL  METHADONE      300 ng/mL  OPIATES      300 ng/mL  PHENCYCLIDINE     25 ng/mL  THC       50 ng/mL  OXYCODONE      100 ng/mL    UA (URINE) with reflex to Scope [[id number]] Collected: 03/13/21 1631    Lab Status: Final result Specimen: Urine, Clean Catch Updated: 03/13/21 1642     Color, UA Yellow     Clarity, UA Clear     Specific Gravity, UA <=1 005     pH, UA 7 5     Leukocytes, UA Negative     Nitrite, UA Negative     Protein, UA Negative mg/dl      Glucose, UA Negative mg/dl Ketones, UA Negative mg/dl      Urobilinogen, UA 0 2 E U /dl      Bilirubin, UA Negative     Blood, UA Negative    Troponin I repeat in 3hrs [[id number]]  (Normal) Collected: 03/13/21 1551    Lab Status: Final result Specimen: Blood from Arm, Right Updated: 03/13/21 1617     Troponin I <0 02 ng/mL     Comprehensive metabolic panel [[id number]]  (Abnormal) Collected: 03/13/21 1311    Lab Status: Final result Specimen: Blood from Arm, Right Updated: 03/13/21 1345     Sodium 138 mmol/L      Potassium 4 5 mmol/L      Chloride 102 mmol/L      CO2 32 mmol/L      ANION GAP 4 mmol/L      BUN 11 mg/dL      Creatinine 0 95 mg/dL      Glucose 107 mg/dL      Calcium 9 1 mg/dL      AST 24 U/L      ALT 72 U/L      Alkaline Phosphatase 88 U/L      Total Protein 8 3 g/dL      Albumin 4 3 g/dL      Total Bilirubin 0 40 mg/dL      eGFR 112 ml/min/1 73sq m     Narrative:      [facility] guidelines for Chronic Kidney Disease (CKD):     Stage 1 with normal or high GFR (GFR > 90 mL/min/1 73 square meters)    Stage 2 Mild CKD (GFR = 60-89 mL/min/1 73 square meters)    Stage 3A Moderate CKD (GFR = 45-59 mL/min/1 73 square meters)    Stage 3B Moderate CKD (GFR = 30-44 mL/min/1 73 square meters)    Stage 4 Severe CKD (GFR = 15-29 mL/min/1 73 square meters)    Stage 5 End Stage CKD (GFR <15 mL/min/1 73 square meters)  Note: GFR calculation is accurate only with a steady state creatinine    Magnesium [[id number]]  (Normal) Collected: 03/13/21 1311    Lab Status: Final result Specimen: Blood from Arm, Right Updated: 03/13/21 1345     Magnesium 1 9 mg/dL     Troponin I [[id number]]  (Normal) Collected: 03/13/21 1311    Lab Status: Final result Specimen: Blood from Arm, Right Updated: 03/13/21 1340     Troponin I <0 02 ng/mL     CBC and differential [[id number]]  (Abnormal) Collected: 03/13/21 1311    Lab Status: Final result Specimen: Blood from Arm, Right Updated: 03/13/21 1318     WBC 9 05 Thousand/uL      RBC 5 00 Million/uL      Hemoglobin 13 2 g/dL      Hematocrit 42 5 %      MCV 85 fL      MCH 26 4 pg      MCHC 31 1 g/dL      RDW 14 3 %      MPV 9 8 fL      Platelets 907 Thousands/uL      nRBC 0 /100 WBCs      Neutrophils Relative 65 %      Immat GRANS % 0 %      Lymphocytes Relative 24 %      Monocytes Relative 9 %      Eosinophils Relative 1 %      Basophils Relative 1 %      Neutrophils Absolute 5 90 Thousands/µL      Immature Grans Absolute 0 04 Thousand/uL      Lymphocytes Absolute 2 17 Thousands/µL      Monocytes Absolute 0 79 Thousand/µL      Eosinophils Absolute 0 09 Thousand/µL      Basophils Absolute 0 06 Thousands/µL     Fingerstick Glucose (POCT) [[id number]]  (Normal) Collected: 03/13/21 1249    Lab Status: Final result Updated: 03/13/21 1249     POC Glucose 80 mg/dl                  CTA head and neck with and without contrast   Final Result by Chaitanya Montiel MD (03/13 1619)   No acute intracranial disease  Stable CTA appearance of the head and neck, no large vessel flow restrictive disease  Workstation performed: [id number]      XR chest 1 view portable   ED Interpretation by Vee Quintanilla DO (03/13 8055)   nad      Final Result by Selvin Mcarthur MD (03/14 6283)   No acute cardiopulmonary disease     Workstation performed: [id number]                 Procedures  ECG 12 Lead Documentation Only    Date/Time: 3/13/2021 12:48 PM  Performed by: Vee Quintanilla DO  Authorized by: Vee Quintanilla DO     Indications / Diagnosis:  Chest pain  ECG reviewed by me, the ED Provider: yes    Patient location:  ED  Previous ECG:     Previous ECG:  Unavailable    Comparison to cardiac monitor: Yes    Interpretation:     Interpretation: normal    Rate:     ECG rate:  63bpm    ECG rate assessment: normal    Rhythm:     Rhythm: sinus rhythm    Ectopy:     Ectopy: none    QRS:     QRS axis:  Normal  Conduction:     Conduction: normal    ST segments:     ST segments:  Normal  T waves:     T waves: normal ED Course             HEART Risk Score      Most Recent Value   Heart Score Risk Calculator   History  1 Filed at: 03/13/2021 1635   ECG  0 Filed at: 03/13/2021 1635   Age  0 Filed at: 03/13/2021 1635   Risk Factors  1 Filed at: 03/13/2021 1635   Troponin  0 Filed at: 03/13/2021 1635   HEART Score  2 Filed at: 03/13/2021 1635                      SBIRT 20yo+      Most Recent Value   SBIRT (23 yo +)   In order to provide better care to our patients, we are screening all of our patients for alcohol and drug use  Would it be okay to ask you these screening questions? Yes Filed at: 03/13/2021 1321   Initial Alcohol Screen: US AUDIT-C    1  How often do you have a drink containing alcohol?  0 Filed at: 03/13/2021 1321   2  How many drinks containing alcohol do you have on a typical day you are drinking? 0 Filed at: 03/13/2021 1321   3a  Male UNDER 65: How often do you have five or more drinks on one occasion? 0 Filed at: 03/13/2021 1321   3b  FEMALE Any Age, or MALE 65+: How often do you have 4 or more drinks on one occassion? 0 Filed at: 03/13/2021 1321   Audit-C Score  0 Filed at: 03/13/2021 1321   ZANA: How many times in the past year have you    Used an illegal drug or used a prescription medication for non-medical reasons? Never Filed at: 03/13/2021 1321                    MDM  Number of Diagnoses or Management Options  Chest pain: new and requires workup  Paresthesias: new and requires workup  Diagnosis management comments: Patient here with complaint of chest pain and paresthesias, similar to previous episodes  Labs, chest x-ray EKG and CT reviewed  No acute pathology identified  Patient is somewhat anxious during ED visit  Will discharge to home with primary care physician follow-up  Patient agrees with plan         Amount and/or Complexity of Data Reviewed  Clinical lab tests: ordered and reviewed  Tests in the radiology section of CPT®: ordered and reviewed    Risk of Complications, Morbidity, and/or Mortality  Presenting problems: high  Diagnostic procedures: high  Management options: high    Patient Progress  Patient progress: improved      Disposition  Final diagnoses:   Chest pain   Paresthesias     Time reflects when diagnosis was documented in both MDM as applicable and the Disposition within this note     Time User Action Codes Description Comment    3/13/2021  4:36 PM Monico Mitchell Add [R07 9] Chest pain     3/13/2021  4:36 PM Monico Mitchell Add [R20 2] Paresthesias       ED Disposition     ED Disposition Condition Date/Time Comment    Discharge Stable Sat Mar 13, 2021  4:36 PM Briana Linear discharge to home/self care              Follow-up Information     Follow up With Specialties Details Why Contact Info    Flores Hoyt MD Internal Medicine Schedule an appointment as soon as possible for a visit in 2 days for follow up [address] [place] [address]  [phone]            Discharge Medication List as of 3/13/2021  4:36 PM      CONTINUE these medications which have NOT CHANGED    Details   amLODIPine (NORVASC) 5 mg tablet Take 1 tablet (5 mg total) by mouth daily, Starting Tue 3/9/2021, Normal      aspirin (ECOTRIN LOW STRENGTH) 81 mg EC tablet Take 1 tablet (81 mg total) by mouth daily, Starting Tue 3/9/2021, Normal      atorvastatin (LIPITOR) 20 mg tablet Take 1 tablet (20 mg total) by mouth daily, Starting Tue 3/9/2021, Normal      Blood Glucose Monitoring Suppl (ONE TOUCH ULTRA 2) w/Device KIT by Does not apply route 3 (three) times a day, Starting Thu 9/3/2020, Normal      Blood Pressure Monitoring (Blood Pressure Monitor/M Cuff) MISC Use 2 (two) times a day, Starting Wed 3/10/2021, Normal      glucose blood (OneTouch Ultra) test strip Use as instructed, Normal      Lancets (onetouch ultrasoft) lancets Use as instructed, Normal      metFORMIN (GLUCOPHAGE-XR) 500 mg 24 hr tablet Take 1 tablet (500 mg total) by mouth daily with lunch, Starting Tue 3/9/2021, Normal      methocarbamol (ROBAXIN) 500 mg tablet Take 1 tablet (500 mg total) by mouth 3 (three) times a day as needed for muscle spasms (Chest pain), Starting Tue 1/12/2021, Normal      nicotine (NICODERM CQ) 14 mg/24hr TD 24 hr patch Place 1 patch on the skin every 24 hours, Starting Thu 9/3/2020, Normal      nicotine (NICODERM CQ) 7 mg/24hr TD 24 hr patch Place 1 patch on the skin every 24 hours, Starting Thu 9/3/2020, Normal           No discharge procedures on file      PDMP Review     None          ED Provider  Electronically Signed by           Claire Neil DO  03/14/21 [id number]

## 2021-03-15 ENCOUNTER — HOSPITAL ENCOUNTER (EMERGENCY)
Facility: HOSPITAL | Age: 46
Discharge: HOME | End: 2021-03-15
Attending: EMERGENCY MEDICINE
Payer: COMMERCIAL

## 2021-03-15 ENCOUNTER — APPOINTMENT (EMERGENCY)
Dept: RADIOLOGY | Facility: HOSPITAL | Age: 46
End: 2021-03-15
Payer: COMMERCIAL

## 2021-03-15 ENCOUNTER — APPOINTMENT (EMERGENCY)
Dept: CT IMAGING | Facility: HOSPITAL | Age: 46
End: 2021-03-15
Payer: COMMERCIAL

## 2021-03-15 VITALS
TEMPERATURE: 98.2 F | DIASTOLIC BLOOD PRESSURE: 84 MMHG | RESPIRATION RATE: 18 BRPM | SYSTOLIC BLOOD PRESSURE: 133 MMHG | HEART RATE: 56 BPM | WEIGHT: 189.6 LBS | OXYGEN SATURATION: 99 % | BODY MASS INDEX: 26.44 KG/M2

## 2021-03-15 DIAGNOSIS — R07.9 CHEST PAIN: Primary | ICD-10-CM

## 2021-03-15 LAB
ANION GAP SERPL CALCULATED.3IONS-SCNC: 10 MMOL/L (ref 4–13)
APTT PPP: 29 SECONDS (ref 23–37)
ATRIAL RATE: 52 BPM
ATRIAL RATE: 59 BPM
ATRIAL RATE: 63 BPM
ATRIAL RATE: 71 BPM
BASOPHILS # BLD AUTO: 0.05 THOUSANDS/ΜL (ref 0–0.1)
BASOPHILS NFR BLD AUTO: 1 % (ref 0–1)
BUN SERPL-MCNC: 14 MG/DL (ref 5–25)
CALCIUM SERPL-MCNC: 9.1 MG/DL (ref 8.3–10.1)
CHLORIDE SERPL-SCNC: 105 MMOL/L (ref 100–108)
CO2 SERPL-SCNC: 26 MMOL/L (ref 21–32)
CREAT SERPL-MCNC: 0.85 MG/DL (ref 0.6–1.3)
EOSINOPHIL # BLD AUTO: 0.06 THOUSAND/ΜL (ref 0–0.61)
EOSINOPHIL NFR BLD AUTO: 1 % (ref 0–6)
ERYTHROCYTE [DISTWIDTH] IN BLOOD BY AUTOMATED COUNT: 14.4 % (ref 11.6–15.1)
GFR SERPL CREATININE-BSD FRML MDRD: 123 ML/MIN/1.73SQ M
GLUCOSE SERPL-MCNC: 115 MG/DL (ref 65–140)
HCT VFR BLD AUTO: 39.1 % (ref 36.5–49.3)
HGB BLD-MCNC: 12.1 G/DL (ref 12–17)
IMM GRANULOCYTES # BLD AUTO: 0.04 THOUSAND/UL (ref 0–0.2)
IMM GRANULOCYTES NFR BLD AUTO: 0 % (ref 0–2)
INR PPP: 1 (ref 0.84–1.19)
LYMPHOCYTES # BLD AUTO: 2.08 THOUSANDS/ΜL (ref 0.6–4.47)
LYMPHOCYTES NFR BLD AUTO: 22 % (ref 14–44)
MAGNESIUM SERPL-MCNC: 1.7 MG/DL (ref 1.6–2.6)
MCH RBC QN AUTO: 26.2 PG (ref 26.8–34.3)
MCHC RBC AUTO-ENTMCNC: 30.9 G/DL (ref 31.4–37.4)
MCV RBC AUTO: 85 FL (ref 82–98)
MONOCYTES # BLD AUTO: 0.82 THOUSAND/ΜL (ref 0.17–1.22)
MONOCYTES NFR BLD AUTO: 9 % (ref 4–12)
NEUTROPHILS # BLD AUTO: 6.25 THOUSANDS/ΜL (ref 1.85–7.62)
NEUTS SEG NFR BLD AUTO: 67 % (ref 43–75)
NRBC BLD AUTO-RTO: 0 /100 WBCS
P AXIS: 18 DEGREES
P AXIS: 39 DEGREES
P AXIS: 58 DEGREES
P AXIS: 61 DEGREES
PLATELET # BLD AUTO: 233 THOUSANDS/UL (ref 149–390)
PMV BLD AUTO: 10.3 FL (ref 8.9–12.7)
POTASSIUM SERPL-SCNC: 3.3 MMOL/L (ref 3.5–5.3)
PR INTERVAL: 138 MS
PR INTERVAL: 142 MS
PR INTERVAL: 148 MS
PR INTERVAL: 170 MS
PROTHROMBIN TIME: 13.3 SECONDS (ref 11.6–14.5)
QRS AXIS: 16 DEGREES
QRS AXIS: 55 DEGREES
QRS AXIS: 61 DEGREES
QRS AXIS: 68 DEGREES
QRSD INTERVAL: 88 MS
QRSD INTERVAL: 90 MS
QRSD INTERVAL: 90 MS
QRSD INTERVAL: 92 MS
QT INTERVAL: 382 MS
QT INTERVAL: 396 MS
QT INTERVAL: 424 MS
QT INTERVAL: 428 MS
QTC INTERVAL: 394 MS
QTC INTERVAL: 405 MS
QTC INTERVAL: 407 MS
QTC INTERVAL: 413 MS
RBC # BLD AUTO: 4.61 MILLION/UL (ref 3.88–5.62)
SODIUM SERPL-SCNC: 141 MMOL/L (ref 136–145)
T WAVE AXIS: 21 DEGREES
T WAVE AXIS: 48 DEGREES
T WAVE AXIS: 51 DEGREES
T WAVE AXIS: 52 DEGREES
TROPONIN I SERPL-MCNC: <0.02 NG/ML
TROPONIN I SERPL-MCNC: <0.02 NG/ML
TSH SERPL DL<=0.05 MIU/L-ACNC: 1.27 UIU/ML (ref 0.36–3.74)
VENTRICULAR RATE: 52 BPM
VENTRICULAR RATE: 56 BPM
VENTRICULAR RATE: 63 BPM
VENTRICULAR RATE: 68 BPM
WBC # BLD AUTO: 9.3 THOUSAND/UL (ref 4.31–10.16)

## 2021-03-15 PROCEDURE — 84443 ASSAY THYROID STIM HORMONE: CPT | Performed by: PHYSICIAN ASSISTANT

## 2021-03-15 PROCEDURE — 83735 ASSAY OF MAGNESIUM: CPT | Performed by: PHYSICIAN ASSISTANT

## 2021-03-15 PROCEDURE — 36415 COLL VENOUS BLD VENIPUNCTURE: CPT | Performed by: PHYSICIAN ASSISTANT

## 2021-03-15 PROCEDURE — G1004 CDSM NDSC: HCPCS

## 2021-03-15 PROCEDURE — 71275 CT ANGIOGRAPHY CHEST: CPT

## 2021-03-15 PROCEDURE — 85025 COMPLETE CBC W/AUTO DIFF WBC: CPT | Performed by: PHYSICIAN ASSISTANT

## 2021-03-15 PROCEDURE — 84484 ASSAY OF TROPONIN QUANT: CPT | Performed by: PHYSICIAN ASSISTANT

## 2021-03-15 PROCEDURE — 80048 BASIC METABOLIC PNL TOTAL CA: CPT | Performed by: PHYSICIAN ASSISTANT

## 2021-03-15 PROCEDURE — 74174 CTA ABD&PLVS W/CONTRAST: CPT

## 2021-03-15 PROCEDURE — 85730 THROMBOPLASTIN TIME PARTIAL: CPT | Performed by: PHYSICIAN ASSISTANT

## 2021-03-15 PROCEDURE — 99285 EMERGENCY DEPT VISIT HI MDM: CPT | Performed by: PHYSICIAN ASSISTANT

## 2021-03-15 PROCEDURE — 93005 ELECTROCARDIOGRAM TRACING: CPT

## 2021-03-15 PROCEDURE — 93010 ELECTROCARDIOGRAM REPORT: CPT | Performed by: INTERNAL MEDICINE

## 2021-03-15 PROCEDURE — 85610 PROTHROMBIN TIME: CPT | Performed by: PHYSICIAN ASSISTANT

## 2021-03-15 PROCEDURE — 71045 X-RAY EXAM CHEST 1 VIEW: CPT

## 2021-03-15 PROCEDURE — 99285 EMERGENCY DEPT VISIT HI MDM: CPT

## 2021-03-15 RX ORDER — POTASSIUM CHLORIDE 20 MEQ/1
40 TABLET, EXTENDED RELEASE ORAL ONCE
Status: COMPLETED | OUTPATIENT
Start: 2021-03-15 | End: 2021-03-15

## 2021-03-15 RX ADMIN — POTASSIUM CHLORIDE 40 MEQ: 1500 TABLET, EXTENDED RELEASE ORAL at 19:17

## 2021-03-15 RX ADMIN — IOHEXOL 100 ML: 350 INJECTION, SOLUTION INTRAVENOUS at 16:37

## 2021-03-15 NOTE — Clinical Note
Uche Montiel was seen and treated in our emergency department on 3/15/2021  Diagnosis:     Julita Durant  may return to work on return date  He may return on this date: 03/17/2021         If you have any questions or concerns, please don't hesitate to call        Moshe Her MD    ______________________________           _______________          _______________  Hospital Representative                              Date                                Time
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
## 2021-03-17 ENCOUNTER — OFFICE VISIT (OUTPATIENT)
Dept: INTERNAL MEDICINE CLINIC | Facility: CLINIC | Age: 46
End: 2021-03-17
Payer: COMMERCIAL

## 2021-03-17 VITALS
WEIGHT: 186.2 LBS | OXYGEN SATURATION: 98 % | DIASTOLIC BLOOD PRESSURE: 86 MMHG | BODY MASS INDEX: 26.07 KG/M2 | HEIGHT: 71 IN | HEART RATE: 75 BPM | TEMPERATURE: 99 F | SYSTOLIC BLOOD PRESSURE: 138 MMHG

## 2021-03-17 DIAGNOSIS — R10.13 DYSPEPSIA: ICD-10-CM

## 2021-03-17 DIAGNOSIS — G43.409 HEMIPLEGIC MIGRAINE WITHOUT STATUS MIGRAINOSUS, NOT INTRACTABLE: ICD-10-CM

## 2021-03-17 DIAGNOSIS — I10 ESSENTIAL HYPERTENSION: ICD-10-CM

## 2021-03-17 DIAGNOSIS — R07.2 PRECORDIAL PAIN: ICD-10-CM

## 2021-03-17 DIAGNOSIS — F17.210 CONTINUOUS DEPENDENCE ON CIGARETTE SMOKING: ICD-10-CM

## 2021-03-17 DIAGNOSIS — F41.9 ANXIETY: Primary | ICD-10-CM

## 2021-03-17 PROCEDURE — 99913: CPT | Performed by: INTERNAL MEDICINE

## 2021-03-17 PROCEDURE — 99214 OFFICE O/P EST MOD 30 MIN: CPT | Performed by: INTERNAL MEDICINE

## 2021-03-17 RX ORDER — LISINOPRIL 5 MG/1
5 TABLET ORAL DAILY
Qty: 30 TABLET | Refills: 1 | Status: SHIPPED | OUTPATIENT
Start: 2021-03-17 | End: 2021-03-18 | Stop reason: CLARIF

## 2021-03-17 RX ORDER — SERTRALINE HYDROCHLORIDE 25 MG/1
TABLET, FILM COATED ORAL
Qty: 63 TABLET | Refills: 0 | Status: SHIPPED | OUTPATIENT
Start: 2021-03-17 | End: 2021-04-19

## 2021-03-17 RX ORDER — NICOTINE 21 MG/24HR
1 PATCH, TRANSDERMAL 24 HOURS TRANSDERMAL EVERY 24 HOURS
Qty: 14 PATCH | Refills: 0
Start: 2021-03-17 | End: 2021-04-13 | Stop reason: ALTCHOICE

## 2021-03-17 RX ORDER — UREA 10 %
500 LOTION (ML) TOPICAL DAILY
Qty: 30 TABLET | Refills: 2 | Status: SHIPPED | OUTPATIENT
Start: 2021-03-17 | End: 2021-10-28 | Stop reason: SDUPTHER

## 2021-03-17 NOTE — ASSESSMENT & PLAN NOTE
Blood pressure still not at goal   His home readings are persistent in 160s  Today in the office was 138/86  Will add low-dose lisinopril, check BMP in 2 weeks  Oriented the patient to call if he has any sings of hypotension,  Including dizziness, lightheadedness, sensation of fainting or if he has any blood pressure readings below 100/60

## 2021-03-17 NOTE — ASSESSMENT & PLAN NOTE
Instructed to try over-the-counter Tums or Pepcid and follow-up in 1 month  Avoid eating large meals, or  Spice/fatty food

## 2021-03-17 NOTE — ASSESSMENT & PLAN NOTE
Presenting with multiple episodes of chest pressure, lightheadedness, racing of his heart, increased respiratory rate without shortness of breath  Occasional racing thoughts and worry  He would like to try anxiety medication  Will start sertraline 25 mg daily  for 1 week and titrate up to 50 mg as tolerated

## 2021-03-17 NOTE — LETTER
March 17, 2021     Patient: Hugo Skelton   YOB: 1975   Date of Visit: 3/17/2021       To Whom it May Concern:    Catie Narvaez is under my professional care  He was seen in my office on 3/17/2021  He may return to work on 03/22/2021  If you have any questions or concerns, please don't hesitate to call           Sincerely,          Dann Holt MD        CC: No Recipients

## 2021-03-17 NOTE — ASSESSMENT & PLAN NOTE
Patient still presenting with intermittent numbness now localized in the left arm in the ventral aspect  He still does not present with headache, but MRI in the last hospital stay show signs sugestive complicated migraine  Will refer to neurology for further evaluation    Recommended supplementation with magnesium 500 mg once a day

## 2021-03-17 NOTE — ASSESSMENT & PLAN NOTE
Multiple ER visits for chest pain, workup so far has been negative, EKG revealed no acute changes  Multiple troponins negative  He also had CT scan for dissection which was negative during last visit on March 15  Patient still due for cardiac stress test and echocardiogram    Although he might have an anxiety component, which we will start to treat, I cannot   Completely exclude cardiac causes  Due to his several risk factors  Echo is schedule for March 22nd, will try to schedule him for cardiologist follow up and stress test as soon as possible to rule out cardiac causes of his persistent chest pain

## 2021-03-17 NOTE — PROGRESS NOTES
Assessment/Plan:    Nonintractable hemiplegic migraine  Patient still presenting with intermittent numbness now localized in the left arm in the ventral aspect  He still does not present with headache, but MRI in the last hospital stay show signs sugestive complicated migraine  Will refer to neurology for further evaluation  Recommended supplementation with magnesium 500 mg once a day    Essential hypertension   Blood pressure still not at goal   His home readings are persistent in 160s  Today in the office was 138/86  Will add low-dose lisinopril, check BMP in 2 weeks  Oriented the patient to call if he has any sings of hypotension,  Including dizziness, lightheadedness, sensation of fainting or if he has any blood pressure readings below 100/60  Continuous dependence on cigarette smoking    Patient is currently using nicotine patch 14 mg per 24 hours, he has reduced his cigarettes to 3 a day  Anxiety    Presenting with multiple episodes of chest pressure, lightheadedness, racing of his heart, increased respiratory rate without shortness of breath  Occasional racing thoughts and worry  He would like to try anxiety medication  Will start sertraline 25 mg daily  for 1 week and titrate up to 50 mg as tolerated  Precordial pain    Multiple ER visits for chest pain, workup so far has been negative, EKG revealed no acute changes  Multiple troponins negative  He also had CT scan for dissection which was negative during last visit on March 15  Patient still due for cardiac stress test and echocardiogram    Although he might have an anxiety component, which we will start to treat, I cannot   Completely exclude cardiac causes  Due to his several risk factors  Echo is schedule for March 22nd, will try to schedule him for cardiologist follow up and stress test as soon as possible to rule out cardiac causes of his persistent chest pain      Dyspepsia    Instructed to try over-the-counter Tums or Pepcid and follow-up in 1 month  Avoid eating large meals, or  Spice/fatty food  Diagnoses and all orders for this visit:    Anxiety  -     sertraline (ZOLOFT) 25 mg tablet; Take 1 tablet (25 mg total) by mouth daily for 7 days, THEN 2 tablets (50 mg total) daily for 28 days  Hemiplegic migraine without status migrainosus, not intractable  -     Ambulatory referral to Neurology; Future  -     magnesium gluconate (MAGONATE) 500 mg tablet; Take 1 tablet (500 mg total) by mouth daily    Essential hypertension  -     lisinopril (ZESTRIL) 5 mg tablet; Take 1 tablet (5 mg total) by mouth daily  -     Basic metabolic panel; Future    Continuous dependence on cigarette smoking  -     nicotine (NICODERM CQ) 14 mg/24hr TD 24 hr patch; Place 1 patch on the skin every 24 hours    Precordial pain    Dyspepsia          Subjective:      Patient ID: Liz Montero is a 41 y o  male  Presents in the office for follow-up visit from 2 ER visits first in March 13 and the 2nd in March 15 for the same complaints of chest pain, arm numbness and  Racing heart  Patient was evaluated with EKG, troponins, drug screening, CT of the chest, CBC, CMP with results being unremarkable,   Except for mild hypokalemia of 3 3  Patient states that since his last visit the in ER he did not have any more of those episodes  He described the  Episodes of sensation of he is going to die, he has a chest pressure/ pain that radiates to his left arm and down to his left leg, associated with left-sided arm numbness which is localized in the ventral aspect of his arm, but he states that he still can feel touching the area, the numbness is more as the sensation a person has when someone sleep over the arm  He denies any weakness or tingling  Today he states that he persist with the numbness and the mention area, no lower extremity complaints  He has no chest pain in the office today    He states that he does have intermittent racing thoughts and worry about things at home  He does not have fear of open spaces  Those episodes have mainly happen at work, he said he happen once at home  He has no past history of anxiety or depression  He also states that since he got a blood pressure machine few weeks ago he has been checking his blood pressure which has been consistent systolic around 151P  He has been compliant to his medications  Is also complaining of fullness, bloating and starr-umbilical discomfort with each meal   He does eat little at still feel full  There is no nausea or vomiting, no regurgitation or heartburn  The following portions of the patient's history were reviewed and updated as appropriate: allergies, current medications, past family history, past medical history, past social history, past surgical history and problem list     Review of Systems   Constitutional: Negative for appetite change and fatigue  Eyes: Negative for visual disturbance  Respiratory: Positive for chest tightness  Negative for cough, shortness of breath and wheezing  Cardiovascular: Positive for chest pain and palpitations  Negative for leg swelling  Gastrointestinal: Negative for abdominal pain, nausea and vomiting  Genitourinary: Negative for difficulty urinating and frequency  Musculoskeletal: Negative for arthralgias and joint swelling  Skin: Negative for rash  Neurological: Positive for light-headedness and numbness  Negative for dizziness, syncope, speech difficulty, weakness and headaches  Psychiatric/Behavioral: Negative for confusion and sleep disturbance  The patient is nervous/anxious  Objective:      /86 (BP Location: Left arm, Patient Position: Sitting, Cuff Size: Adult)   Pulse 75   Temp 99 °F (37 2 °C) (Tympanic)   Ht 5' 11" (1 803 m)   Wt 84 5 kg (186 lb 3 2 oz)   SpO2 98%   BMI 25 97 kg/m²          Physical Exam  Vitals signs and nursing note reviewed     Constitutional:       General: He is not in acute distress  Appearance: He is well-developed  HENT:      Head: Normocephalic and atraumatic  Eyes:      Conjunctiva/sclera: Conjunctivae normal       Pupils: Pupils are equal, round, and reactive to light  Neck:      Musculoskeletal: Normal range of motion and neck supple  Thyroid: No thyromegaly  Cardiovascular:      Rate and Rhythm: Normal rate and regular rhythm  Pulses: Normal pulses  Heart sounds: Normal heart sounds  No murmur  Pulmonary:      Effort: No respiratory distress  Breath sounds: Normal breath sounds  No wheezing  Abdominal:      General: Bowel sounds are normal  There is no distension  Palpations: Abdomen is soft  Tenderness: There is no abdominal tenderness  Musculoskeletal: Normal range of motion  Skin:     General: Skin is warm and dry  Capillary Refill: Capillary refill takes less than 2 seconds  Comments: Clubbing present bilaterally   Neurological:      General: No focal deficit present  Mental Status: He is alert and oriented to person, place, and time  Sensory: No sensory deficit  Motor: No weakness or abnormal muscle tone  Gait: Gait normal    Psychiatric:         Thought Content:  Thought content normal          Judgment: Judgment normal

## 2021-03-17 NOTE — ASSESSMENT & PLAN NOTE
Patient is currently using nicotine patch 14 mg per 24 hours, he has reduced his cigarettes to 3 a day

## 2021-03-18 ENCOUNTER — OFFICE VISIT (OUTPATIENT)
Dept: CARDIOLOGY CLINIC | Facility: CLINIC | Age: 46
End: 2021-03-18
Payer: COMMERCIAL

## 2021-03-18 VITALS
HEART RATE: 69 BPM | WEIGHT: 186.1 LBS | HEIGHT: 71 IN | OXYGEN SATURATION: 98 % | BODY MASS INDEX: 26.05 KG/M2 | DIASTOLIC BLOOD PRESSURE: 84 MMHG | SYSTOLIC BLOOD PRESSURE: 136 MMHG

## 2021-03-18 DIAGNOSIS — I10 HYPERTENSION, UNSPECIFIED TYPE: ICD-10-CM

## 2021-03-18 DIAGNOSIS — R07.9 CHEST PAIN, UNSPECIFIED TYPE: Primary | ICD-10-CM

## 2021-03-18 DIAGNOSIS — Z72.0 TOBACCO ABUSE: ICD-10-CM

## 2021-03-18 DIAGNOSIS — E78.5 HYPERLIPIDEMIA, UNSPECIFIED HYPERLIPIDEMIA TYPE: ICD-10-CM

## 2021-03-18 PROCEDURE — 99214 OFFICE O/P EST MOD 30 MIN: CPT | Performed by: NURSE PRACTITIONER

## 2021-03-18 PROCEDURE — 3075F SYST BP GE 130 - 139MM HG: CPT | Performed by: NURSE PRACTITIONER

## 2021-03-18 PROCEDURE — 3079F DIAST BP 80-89 MM HG: CPT | Performed by: NURSE PRACTITIONER

## 2021-03-18 PROCEDURE — 4004F PT TOBACCO SCREEN RCVD TLK: CPT | Performed by: NURSE PRACTITIONER

## 2021-03-18 NOTE — PATIENT INSTRUCTIONS
2gm sodium low fat low cholesteol     DO not take Lisinopril   Take Lipitor at dinner or bedtime     Ensure undergo testing : Stress test and echocardiogram

## 2021-03-18 NOTE — PROGRESS NOTES
Cardiology Follow Up    Fito Mujica  1975  [id number]  [facility]  [facility]  [address]  [phone]    No diagnosis found  Interval History:     On 1/11/21 Mr Alejandrina Rowell presented to [facility] Emergency room with chest pain  He developed chest pain with right sided numbness at 3 am while at work  CP was associated with shortness of breath, right leg numbness/tingling which improved after taking ASA  Troponin <0 02  EKG showed NSR without ST T wave changes  He was discharged home  On 3/13/21 Mr Izzy Dixon presented to [address] [place] [address] ED with complaint of a 3 day hx of  CP and shortness of breath  Complained numbness and left upper and lower extremities which felt like tingling, paresthesias  EKG NSR without ST wave changes, troponin negative  On 3/15/21 Mr Izzy Dixon presented to [address] [place] [address] ED with complaints of   Sudden numbness and tingling in his left arm and leg and chest pain radiating to his and left arm down leg  He called 911 was treated aspirin 325 mg daily nitro glycerin worth relief of pain  K+ 3 3  Troponin negative, EKG SB 56 BPM no ST wave changes  Exercise stress test ordered      Mr Alejandrina Rowell presents to our office for a cardiology follow up visit  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  He has left arm numbness down the arm that comes and goes  He admits to left sided chest pain that lasts one hour and improves and returns  He admits to the pain occurring when opening and closing his arm  He denies CP with walking or going up and down stairs  Mr Izzy Dixon smokes 3-4 cigarettes a day  He admits to episode of CP with coffee  He continues to drink 1 wiskey or so a week and soda 2 drinks a day  Echocardiogram ordered Mr Izzy Dixon did not have this testing done          HPI:  Hypertension  Hyperlipidemia 1/11/21 , TG 83, HDL 51, LDL 90  Chest pain   DM2 HgbA1C 5 7   Tobacco abuse one pack a day  + ETOH drinking wiskey weekly and soda daily    Patient Active Problem List   Diagnosis    Continuous dependence on cigarette smoking    Clubbing of nail    Primary insomnia    Nonintractable hemiplegic migraine    Alcohol use    Essential hypertension    Type 2 diabetes mellitus without complication, without long-term current use of insulin (HCC)    Metabolic syndrome    Precordial pain    Lightheadedness    Anxiety    Dyspepsia     Past Medical History:   Diagnosis Date    Chest pain 1/12/2021    Diabetes mellitus ([address] )     Exposure to SARS-associated coronavirus 10/5/2020    Hypertension     Viral upper respiratory tract infection 10/7/2020     Social History     Socioeconomic History    Marital status: /Civil Union     Spouse name: Not on file    Number of children: 3    Years of education: Not on file    Highest education level: High school graduate   Occupational History    Not on file   Social Needs    Financial resource strain: Not very hard    Food insecurity     Worry: Never true     Inability: Never true    Transportation needs     Medical: No     Non-medical: No   Tobacco Use    Smoking status: Current Every Day Smoker     Packs/day: 1 00     Years: 25 00     Pack years: 25 00     Types: Cigarettes     Start date: 2/18/1995    Smokeless tobacco: Never Used   Substance and Sexual Activity    Alcohol use: Yes     Frequency: 2-3 times a week     Drinks per session: 1 or 2     Binge frequency: Never    Drug use: Never    Sexual activity: Not Currently     Partners: Female     Comment: not concerned    Lifestyle    Physical activity     Days per week: 0 days     Minutes per session: 0 min    Stress: Not at all   Relationships    Social connections     Talks on phone: More than three times a week     Gets together: Once a week     Attends Scientologist service: Never     Active member of club or organization: No Attends meetings of clubs or organizations: Never     Relationship status:     Intimate partner violence     Fear of current or ex partner: No     Emotionally abused: No     Physically abused: No     Forced sexual activity: No   Other Topics Concern    Not on file   Social History Narrative    Not on file      Family History   Problem Relation Age of Onset    Diabetes Mother     Diabetes Father      No past surgical history on file      Current Outpatient Medications:     amLODIPine (NORVASC) 5 mg tablet, Take 1 tablet (5 mg total) by mouth daily, Disp: 30 tablet, Rfl: 5    aspirin (ECOTRIN LOW STRENGTH) 81 mg EC tablet, Take 1 tablet (81 mg total) by mouth daily, Disp: 90 tablet, Rfl: 2    atorvastatin (LIPITOR) 20 mg tablet, Take 1 tablet (20 mg total) by mouth daily, Disp: 90 tablet, Rfl: 3    Blood Glucose Monitoring Suppl (ONE TOUCH ULTRA 2) w/Device KIT, by Does not apply route 3 (three) times a day, Disp: 1 each, Rfl: 0    Blood Pressure Monitoring (Blood Pressure Monitor/M Cuff) MISC, Use 2 (two) times a day, Disp: 1 each, Rfl: 0    glucose blood (OneTouch Ultra) test strip, Use as instructed, Disp: 100 each, Rfl: 1    Lancets (onetouch ultrasoft) lancets, Use as instructed, Disp: 100 each, Rfl: 1    lisinopril (ZESTRIL) 5 mg tablet, Take 1 tablet (5 mg total) by mouth daily, Disp: 30 tablet, Rfl: 1    magnesium gluconate (MAGONATE) 500 mg tablet, Take 1 tablet (500 mg total) by mouth daily, Disp: 30 tablet, Rfl: 2    metFORMIN (GLUCOPHAGE-XR) 500 mg 24 hr tablet, Take 1 tablet (500 mg total) by mouth daily with lunch, Disp: 90 tablet, Rfl: 3    methocarbamol (ROBAXIN) 500 mg tablet, Take 1 tablet (500 mg total) by mouth 3 (three) times a day as needed for muscle spasms (Chest pain) (Patient not taking: Reported on 3/17/2021), Disp: 15 tablet, Rfl: 0    nicotine (NICODERM CQ) 14 mg/24hr TD 24 hr patch, Place 1 patch on the skin every 24 hours, Disp: 14 patch, Rfl: 0    sertraline (ZOLOFT) 25 mg tablet, Take 1 tablet (25 mg total) by mouth daily for 7 days, THEN 2 tablets (50 mg total) daily for 28 days  , Disp: 63 tablet, Rfl: 0  No Known Allergies    Labs:  Admission on 03/15/2021, Discharged on 03/15/2021   Component Date Value    WBC 03/15/2021 9 30     RBC 03/15/2021 4 61     Hemoglobin 03/15/2021 12 1     Hematocrit 03/15/2021 39 1     MCV 03/15/2021 85     MCH 03/15/2021 26 2*    MCHC 03/15/2021 30 9*    RDW 03/15/2021 14 4     MPV 03/15/2021 10 3     Platelets 21/20/4307 233     nRBC 03/15/2021 0     Neutrophils Relative 03/15/2021 67     Immat GRANS % 03/15/2021 0     Lymphocytes Relative 03/15/2021 22     Monocytes Relative 03/15/2021 9     Eosinophils Relative 03/15/2021 1     Basophils Relative 03/15/2021 1     Neutrophils Absolute 03/15/2021 6 25     Immature Grans Absolute 03/15/2021 0 04     Lymphocytes Absolute 03/15/2021 2 08     Monocytes Absolute 03/15/2021 0 82     Eosinophils Absolute 03/15/2021 0 06     Basophils Absolute 03/15/2021 0 05     Protime 03/15/2021 13 3     INR 03/15/2021 1 00     PTT 03/15/2021 29     Sodium 03/15/2021 141     Potassium 03/15/2021 3 3*    Chloride 03/15/2021 105     CO2 03/15/2021 26     ANION GAP 03/15/2021 10     BUN 03/15/2021 14     Creatinine 03/15/2021 0 85     Glucose 03/15/2021 115     Calcium 03/15/2021 9 1     eGFR 03/15/2021 123     TSH 3RD GENERATON 03/15/2021 1 271     Magnesium 03/15/2021 1 7     Troponin I 03/15/2021 <0 02     Troponin I 03/15/2021 <0 02     Ventricular Rate 03/15/2021 68     Atrial Rate 03/15/2021 71     UT Interval 03/15/2021 170     QRSD Interval 03/15/2021 88     QT Interval 03/15/2021 382     QTC Interval 03/15/2021 407     P Axis 03/15/2021 61     QRS Axis 03/15/2021 61     T Wave Axis 03/15/2021 51     Ventricular Rate 03/15/2021 56     Atrial Rate 03/15/2021 59     UT Interval 03/15/2021 138     QRSD Interval 03/15/2021 90     QT Interval 03/15/2021 428     QTC Interval 03/15/2021 413     P Axis 03/15/2021 58     QRS Axis 03/15/2021 68     T Wave [place] 03/15/2021 48    Admission on 03/13/2021, Discharged on 03/13/2021   Component Date Value    POC Glucose 03/13/2021 80     WBC 03/13/2021 9 05     RBC 03/13/2021 5 00     Hemoglobin 03/13/2021 13 2     Hematocrit 03/13/2021 42 5     MCV 03/13/2021 85     MCH 03/13/2021 26 4*    MCHC 03/13/2021 31 1*    RDW 03/13/2021 14 3     MPV 03/13/2021 9 8     Platelets 29/06/5457 254     nRBC 03/13/2021 0     Neutrophils Relative 03/13/2021 65     Immat GRANS % 03/13/2021 0     Lymphocytes Relative 03/13/2021 24     Monocytes Relative 03/13/2021 9     Eosinophils Relative 03/13/2021 1     Basophils Relative 03/13/2021 1     Neutrophils Absolute 03/13/2021 5 90     Immature Grans Absolute 03/13/2021 0 04     Lymphocytes Absolute 03/13/2021 2 17     Monocytes Absolute 03/13/2021 0 79     Eosinophils Absolute 03/13/2021 0 09     Basophils Absolute 03/13/2021 0 06     Sodium 03/13/2021 138     Potassium 03/13/2021 4 5     Chloride 03/13/2021 102     CO2 03/13/2021 32     ANION GAP 03/13/2021 4     BUN 03/13/2021 11     Creatinine 03/13/2021 0 95     Glucose 03/13/2021 107     Calcium 03/13/2021 9 1     AST 03/13/2021 24     ALT 03/13/2021 72     Alkaline Phosphatase 03/13/2021 88     Total Protein 03/13/2021 8 3*    Albumin 03/13/2021 4 3     Total Bilirubin 03/13/2021 0 40     eGFR 03/13/2021 112     Troponin I 03/13/2021 <0 02     Magnesium 03/13/2021 1 9     Color, UA 03/13/2021 Yellow     Clarity, UA 03/13/2021 Clear     Specific Gravity, UA 03/13/2021 <=1 005     pH, UA 03/13/2021 7 5     Leukocytes, UA 03/13/2021 Negative     Nitrite, UA 03/13/2021 Negative     Protein, UA 03/13/2021 Negative     Glucose, UA 03/13/2021 Negative     Ketones, UA 03/13/2021 Negative     Urobilinogen, UA 03/13/2021 0 2     Bilirubin, UA 03/13/2021 Negative     Blood, UA 03/13/2021 Negative     Amph/Meth UR 03/13/2021 Negative     Barbiturate Ur 03/13/2021 Negative     Benzodiazepine Urine 03/13/2021 Negative     Cocaine Urine 03/13/2021 Negative     Methadone Urine 03/13/2021 Negative     Opiate Urine 03/13/2021 Negative     PCP Ur 03/13/2021 Negative     THC Urine 03/13/2021 Negative     Oxycodone Urine 03/13/2021 Negative     Troponin I 03/13/2021 <0 02     Ventricular Rate 03/13/2021 63     Atrial Rate 03/13/2021 63     GA Interval 03/13/2021 142     QRSD Interval 03/13/2021 92     QT Interval 03/13/2021 396     QTC Interval 03/13/2021 405     P Axis 03/13/2021 18     QRS Axis 03/13/2021 55     T Wave Axis 03/13/2021 52     Ventricular Rate 03/13/2021 52     Atrial Rate 03/13/2021 52     GA Interval 03/13/2021 148     QRSD Interval 03/13/2021 90     QT Interval 03/13/2021 424     QTC Interval 03/13/2021 394     P Axis 03/13/2021 39     QRS Axis 03/13/2021 16     T Wave Axis 03/13/2021 21      Imaging: Cta Head And Neck With And Without Contrast    Result Date: 3/13/2021  Narrative: CTA NECK AND BRAIN WITH AND WITHOUT CONTRAST INDICATION: Dizziness, non-specific headache/near syncope/left sided paresthesias COMPARISON:   CTA 9/1/2020, MR 9/1/2020 TECHNIQUE:  Routine CT imaging of the Brain without contrast   Post contrast imaging was performed after administration of iodinated contrast through the neck and brain  Post contrast axial 0 625 mm images timed to opacify the arterial system  3D rendering was performed on an independent workstation  MIP reconstructions performed  Coronal reconstructions were performed of the noncontrast portion of the brain  Radiation dose length product (DLP) for this visit:  775 44 mGy-cm   This examination, like all CT scans performed in the [facility], was performed utilizing techniques to minimize radiation dose exposure, including the use of iterative  reconstruction and automated exposure control     IV Contrast:  85 mL of iohexol (OMNIPAQUE)  IMAGE QUALITY:   Diagnostic FINDINGS: NONCONTRAST BRAIN PARENCHYMA:  No intracranial mass, mass effect or midline shift  No CT signs of acute infarction  No acute parenchymal hemorrhage  VENTRICLES AND EXTRA-AXIAL SPACES:  Normal for the patient's age  VISUALIZED ORBITS AND PARANASAL SINUSES:  Unremarkable  CERVICAL VASCULATURE AORTIC ARCH AND GREAT VESSELS:  Normal aortic arch and great vessel origins  Normal visualized subclavian vessels  RIGHT VERTEBRAL ARTERY CERVICAL SEGMENT:  Normal origin  The vessel is normal in caliber throughout the neck  LEFT VERTEBRAL ARTERY CERVICAL SEGMENT:  Normal origin  The vessel is normal in caliber throughout the neck  RIGHT EXTRACRANIAL CAROTID SEGMENT:  Normal caliber common carotid artery  Normal bifurcation and cervical internal carotid artery  No stenosis or dissection  LEFT EXTRACRANIAL CAROTID SEGMENT:  Normal caliber common carotid artery  Normal bifurcation and cervical internal carotid artery  No stenosis or dissection  NASCET criteria was used to determine the degree of internal carotid artery diameter stenosis  INTRACRANIAL VASCULATURE INTERNAL CAROTID ARTERIES:  Normal enhancement of the intracranial portions of the internal carotid arteries  Normal ophthalmic artery origins  Normal ICA terminus  ANTERIOR CIRCULATION:  Symmetric A1 segments and anterior cerebral arteries with normal enhancement  Normal anterior communicating artery  MIDDLE CEREBRAL ARTERY CIRCULATION:  M1 segment and middle cerebral artery branches demonstrate normal enhancement bilaterally  DISTAL VERTEBRAL ARTERIES:  Normal distal vertebral arteries  Posterior inferior cerebellar artery origins are normal  Normal vertebral basilar junction  BASILAR ARTERY:  Basilar artery is normal in caliber  Normal superior cerebellar arteries  POSTERIOR CEREBRAL ARTERIES: Both posterior cerebral arteries demonstrate normal enhancement     Normal left posterior communicating artery  DURAL VENOUS SINUSES:  Normal  NON VASCULAR ANATOMY BONY STRUCTURES:  No acute osseous abnormality  Osteomalacia of the maxilla related to poor dentition  SOFT TISSUES OF THE NECK:  Normal  THORACIC INLET:  Several small blebs in the upper lung fields  Impression: No acute intracranial disease  Stable CTA appearance of the head and neck, no large vessel flow restrictive disease  Workstation performed: [id number]    Xr Chest 1 View Portable    Result Date: 3/15/2021  Narrative: CHEST INDICATION:   chest pain  COMPARISON:  Chest radiograph dated 3/13/2021  EXAM PERFORMED/VIEWS:  XR CHEST PORTABLE FINDINGS: Cardiomediastinal silhouette appears unremarkable  No focal airspace consolidation  No pneumothorax or pleural effusion  Osseous structures appear within normal limits for patient age  Impression: No acute cardiopulmonary disease  Workstation performed: [id number]     Xr Chest 1 View Portable    Result Date: 3/14/2021  Narrative: CHEST INDICATION:   chest pain  COMPARISON:  Chest radiograph from 1/11/2021, chest CT from 9/1/2020, and CTA of the neck from today which includes the upper chest  EXAM PERFORMED/VIEWS:  XR CHEST PORTABLE FINDINGS: Cardiomediastinal silhouette appears unremarkable  The lungs are clear  No pneumothorax or pleural effusion  Osseous structures appear within normal limits for patient age  Impression: No acute cardiopulmonary disease  Workstation performed: [id number]    Cta Dissection Protocol Chest Abdomen Pelvis W Wo Contrast    Result Date: 3/15/2021  Narrative: CT ANGIOGRAM OF THE CHEST, ABDOMEN AND PELVIS WITH AND WITHOUT IV CONTRAST INDICATION:  Chest pain radiating to the back COMPARISON: 9/1/2020 TECHNIQUE:  CT angiogram examination of the chest, abdomen and pelvis was performed according to standard protocol    This examination, like all CT scans performed in the [facility], was performed utilizing techniques to minimize radiation dose exposure, including the use of iterative reconstruction and automated exposure control  Contrast as well as noncontrast images were obtained  3D reconstructions were performed an independent workstation, and are supplied for review  Rad dose 1117 mGy-cm IV Contrast:  100 mL of iohexol (OMNIPAQUE) Enteric Contrast: FINDINGS: VASCULAR STRUCTURES: OTHER FINDINGS: CHEST: HEART:  Normal cardiac size  No pericardial effusion  LUNGS:  Right lower lobe 4 mm nodule on 5/23  PLEURA: No pleural effusion  MEDIASTINUM AND MICAH:  No mass or significant lymphadenopathy  CHEST WALL AND LOWER NECK: Normal  ABDOMEN LIVER/BILIARY TREE:  Enlarged fatty liver noted  Myra Patel GALLBLADDER:  No calcified gallstones  No pericholecystic inflammatory change  SPLEEN:  Unremarkable  Normal size  PANCREAS:  Unremarkable  ADRENAL GLANDS:  Unremarkable  KIDNEYS/URETERS:  No solid renal mass  No hydronephrosis  No urinary tract calculi  PELVIS REPRODUCTIVE ORGANS:  Unremarkable for patient's age  URINARY BLADDER:  Unremarkable  ADDITIONAL ABDOMINAL AND PELVIC STRUCTURES: STOMACH AND BOWEL:  Unremarkable  ABDOMINOPELVIC CAVITY:  No pathologically enlarged mesenteric or retroperitoneal lymph nodes  No ascites or free intraperitoneal air  ABDOMINAL WALL/INGUINAL REGIONS:  There is a small fat-containing umbilical hernia  OSSEOUS STRUCTURES:  No acute fracture or destructive osseous lesion  Impression: 1  No aortic dissection or aneurysm  2   4 mm right lower lobe pulmonary nodule  Based on current Fleischner Society 2017 Guidelines on incidental pulmonary nodule, no routine follow-up is needed if the patient is considered low risk for lung cancer  If the patient is considered high risk for lung cancer, 12 month follow-up non-contrast chest CT is recommended  The study was marked in EPIC for significant notification  Workstation performed: [id number]       Review of Systems:  Review of Systems   Respiratory: Positive for shortness of breath  Cardiovascular: Positive for chest pain  Neurological: Positive for numbness  Numbness and tingling in his left arm   Psychiatric/Behavioral: The patient is nervous/anxious  All other systems reviewed and are negative  Physical Exam:  Physical Exam  Vitals signs reviewed  Constitutional:       Appearance: Normal appearance  HENT:      Head: Normocephalic  Eyes:      Pupils: Pupils are equal, round, and reactive to light  Cardiovascular:      Rate and Rhythm: Normal rate and regular rhythm  Pulmonary:      Effort: Pulmonary effort is normal       Breath sounds: Normal breath sounds  Abdominal:      General: Bowel sounds are normal       Palpations: Abdomen is soft  Musculoskeletal: Normal range of motion  Right lower leg: No edema  Left lower leg: No edema  Skin:     General: Skin is warm and dry  Capillary Refill: Capillary refill takes less than 2 seconds  Neurological:      General: No focal deficit present  Mental Status: He is alert  Psychiatric:         Mood and Affect: Mood normal          Behavior: Behavior normal          Discussion/Summary:  1  Chest pain, appears atypical, Risk factors include family hx of CAD, DM2  HLD and tobacco abuse  Mr Gafrield Huggins a 2 D echocardiogram and exercise stress test to be done in the near future  2  Hypertension- RUE sitting 124/70 continue on Norvasc 5mg daily, do not start Lisinopril 5mg daily, 2gm sodium diet   3  Hyperlipidemia 1/11/21 , TG 83, HDL 51, LDL 90- continue on Lipitor 20mg daily at dinner or bedtime  4  DM2 HgbA1C 5 7 per PCP  5   Tobacco abuse decrease to 3-4 cigarettes a day, continue to use the nicotine patch 14mg daily one pack a day

## 2021-03-22 ENCOUNTER — HOSPITAL ENCOUNTER (OUTPATIENT)
Dept: NON INVASIVE DIAGNOSTICS | Facility: HOSPITAL | Age: 46
Discharge: HOME | End: 2021-03-22
Payer: COMMERCIAL

## 2021-03-22 DIAGNOSIS — R00.2 PALPITATIONS: ICD-10-CM

## 2021-03-22 PROCEDURE — 93306 TTE W/DOPPLER COMPLETE: CPT

## 2021-03-22 PROCEDURE — 93306 TTE W/DOPPLER COMPLETE: CPT | Performed by: INTERNAL MEDICINE

## 2021-03-28 ENCOUNTER — APPOINTMENT (EMERGENCY)
Dept: RADIOLOGY | Facility: HOSPITAL | Age: 46
End: 2021-03-28
Payer: COMMERCIAL

## 2021-03-28 ENCOUNTER — HOSPITAL ENCOUNTER (EMERGENCY)
Facility: HOSPITAL | Age: 46
Discharge: HOME | End: 2021-03-28
Attending: EMERGENCY MEDICINE
Payer: COMMERCIAL

## 2021-03-28 VITALS
OXYGEN SATURATION: 100 % | DIASTOLIC BLOOD PRESSURE: 76 MMHG | HEART RATE: 54 BPM | WEIGHT: 186 LBS | BODY MASS INDEX: 26.04 KG/M2 | TEMPERATURE: 97.7 F | RESPIRATION RATE: 16 BRPM | HEIGHT: 71 IN | SYSTOLIC BLOOD PRESSURE: 142 MMHG

## 2021-03-28 DIAGNOSIS — R07.89 ATYPICAL CHEST PAIN: Primary | ICD-10-CM

## 2021-03-28 LAB
ANION GAP SERPL CALCULATED.3IONS-SCNC: 6 MMOL/L (ref 4–13)
ATRIAL RATE: 52 BPM
BASOPHILS # BLD AUTO: 0.07 THOUSANDS/ΜL (ref 0–0.1)
BASOPHILS NFR BLD AUTO: 1 % (ref 0–1)
BUN SERPL-MCNC: 13 MG/DL (ref 5–25)
CALCIUM SERPL-MCNC: 9.5 MG/DL (ref 8.3–10.1)
CHLORIDE SERPL-SCNC: 104 MMOL/L (ref 100–108)
CO2 SERPL-SCNC: 31 MMOL/L (ref 21–32)
CREAT SERPL-MCNC: 1.09 MG/DL (ref 0.6–1.3)
EOSINOPHIL # BLD AUTO: 0.08 THOUSAND/ΜL (ref 0–0.61)
EOSINOPHIL NFR BLD AUTO: 1 % (ref 0–6)
ERYTHROCYTE [DISTWIDTH] IN BLOOD BY AUTOMATED COUNT: 14.6 % (ref 11.6–15.1)
GFR SERPL CREATININE-BSD FRML MDRD: 95 ML/MIN/1.73SQ M
GLUCOSE SERPL-MCNC: 117 MG/DL (ref 65–140)
HCT VFR BLD AUTO: 42.1 % (ref 36.5–49.3)
HGB BLD-MCNC: 13.2 G/DL (ref 12–17)
IMM GRANULOCYTES # BLD AUTO: 0.04 THOUSAND/UL (ref 0–0.2)
IMM GRANULOCYTES NFR BLD AUTO: 1 % (ref 0–2)
LYMPHOCYTES # BLD AUTO: 2.32 THOUSANDS/ΜL (ref 0.6–4.47)
LYMPHOCYTES NFR BLD AUTO: 28 % (ref 14–44)
MCH RBC QN AUTO: 26.4 PG (ref 26.8–34.3)
MCHC RBC AUTO-ENTMCNC: 31.4 G/DL (ref 31.4–37.4)
MCV RBC AUTO: 84 FL (ref 82–98)
MONOCYTES # BLD AUTO: 0.84 THOUSAND/ΜL (ref 0.17–1.22)
MONOCYTES NFR BLD AUTO: 10 % (ref 4–12)
NEUTROPHILS # BLD AUTO: 4.93 THOUSANDS/ΜL (ref 1.85–7.62)
NEUTS SEG NFR BLD AUTO: 59 % (ref 43–75)
NRBC BLD AUTO-RTO: 0 /100 WBCS
P AXIS: 30 DEGREES
PLATELET # BLD AUTO: 251 THOUSANDS/UL (ref 149–390)
PMV BLD AUTO: 9.8 FL (ref 8.9–12.7)
POTASSIUM SERPL-SCNC: 4 MMOL/L (ref 3.5–5.3)
PR INTERVAL: 158 MS
QRS AXIS: 50 DEGREES
QRSD INTERVAL: 86 MS
QT INTERVAL: 434 MS
QTC INTERVAL: 403 MS
RBC # BLD AUTO: 5 MILLION/UL (ref 3.88–5.62)
SODIUM SERPL-SCNC: 141 MMOL/L (ref 136–145)
T WAVE AXIS: 59 DEGREES
TROPONIN I SERPL-MCNC: <0.02 NG/ML
TROPONIN I SERPL-MCNC: <0.02 NG/ML
VENTRICULAR RATE: 52 BPM
WBC # BLD AUTO: 8.28 THOUSAND/UL (ref 4.31–10.16)

## 2021-03-28 PROCEDURE — 84484 ASSAY OF TROPONIN QUANT: CPT | Performed by: EMERGENCY MEDICINE

## 2021-03-28 PROCEDURE — 93005 ELECTROCARDIOGRAM TRACING: CPT

## 2021-03-28 PROCEDURE — 85025 COMPLETE CBC W/AUTO DIFF WBC: CPT | Performed by: EMERGENCY MEDICINE

## 2021-03-28 PROCEDURE — 80048 BASIC METABOLIC PNL TOTAL CA: CPT | Performed by: EMERGENCY MEDICINE

## 2021-03-28 PROCEDURE — 71045 X-RAY EXAM CHEST 1 VIEW: CPT

## 2021-03-28 PROCEDURE — 36415 COLL VENOUS BLD VENIPUNCTURE: CPT | Performed by: EMERGENCY MEDICINE

## 2021-03-28 PROCEDURE — 99285 EMERGENCY DEPT VISIT HI MDM: CPT

## 2021-03-28 PROCEDURE — 99285 EMERGENCY DEPT VISIT HI MDM: CPT | Performed by: EMERGENCY MEDICINE

## 2021-03-28 PROCEDURE — 93010 ELECTROCARDIOGRAM REPORT: CPT | Performed by: INTERNAL MEDICINE

## 2021-03-28 RX ORDER — SODIUM CHLORIDE 9 MG/ML
3 INJECTION INTRAVENOUS
Status: DISCONTINUED | OUTPATIENT
Start: 2021-03-28 | End: 2021-03-28 | Stop reason: HOSPADM

## 2021-03-28 RX ADMIN — SODIUM CHLORIDE 3 ML: 9 INJECTION, SOLUTION INTRAMUSCULAR; INTRAVENOUS; SUBCUTANEOUS at 20:18

## 2021-03-28 NOTE — Clinical Note
Heidy Germania was seen and treated in our emergency department on 3/28/2021  Diagnosis:     Amber Linda  may return to work on return date  He may return on this date: 04/02/2021         If you have any questions or concerns, please don't hesitate to call        Dada Edge DO    ______________________________           _______________          _______________  Hospital Representative                              Date                                Time

## 2021-03-28 NOTE — ED PROVIDER NOTES
History  Chief Complaint   Patient presents with    Chest Pain     pt presents with left sided chest pain that started while driving, states was seen 2 weeks ago for similar symptoms      Patient is a 43-year-old male with a history of diabetes and hypertension who presents with chest pain and left-sided numbness  Patient states he was driving when he developed numbness in the left neck  He states that it went into his left arm into his fingers and his left leg  He states that the numbness lasted about 40 minutes and resolved  However, shortly after the numbness began he developed left-sided chest pain  He has had similar episodes of chest pain and numbness previously  He had a normal echocardiogram recently and is scheduled to have a stress test this Thursday, 4/1/21  He states that the chest pain has been intermittent since onset today  He denies any numbness, tingling, weakness, speech difficulty, gait instability or other concerns at this time  History provided by:  Patient  Chest Pain  Pain location:  L chest  Pain radiates to:  Does not radiate  Pain radiates to the back: no    Timing:  Intermittent  Chronicity:  New  Ineffective treatments:  None tried  Associated symptoms: numbness (resolved)    Associated symptoms: no abdominal pain, no back pain, no cough, no diaphoresis, no dizziness, no dysphagia, no fever, no headache, no nausea, no palpitations, no shortness of breath and not vomiting        Prior to Admission Medications   Prescriptions Last Dose Informant Patient Reported? Taking?    Blood Glucose Monitoring Suppl (ONE TOUCH ULTRA 2) w/Device KIT  Self No No   Sig: by Does not apply route 3 (three) times a day   Blood Pressure Monitoring (Blood Pressure Monitor/M Cuff) MISC  Self No No   Sig: Use 2 (two) times a day   Lancets (onetouch ultrasoft) lancets  Self No No   Sig: Use as instructed   amLODIPine (NORVASC) 5 mg tablet  Self No No   Sig: Take 1 tablet (5 mg total) by mouth daily aspirin (ECOTRIN LOW STRENGTH) 81 mg EC tablet  Self No No   Sig: Take 1 tablet (81 mg total) by mouth daily   atorvastatin (LIPITOR) 20 mg tablet  Self No No   Sig: Take 1 tablet (20 mg total) by mouth daily   glucose blood (OneTouch Ultra) test strip  Self No No   Sig: Use as instructed   magnesium gluconate (MAGONATE) 500 mg tablet  Self No No   Sig: Take 1 tablet (500 mg total) by mouth daily   Patient not taking: Reported on 3/18/2021   metFORMIN (GLUCOPHAGE-XR) 500 mg 24 hr tablet  Self No No   Sig: Take 1 tablet (500 mg total) by mouth daily with lunch   Patient not taking: Reported on 3/18/2021   methocarbamol (ROBAXIN) 500 mg tablet  Self No No   Sig: Take 1 tablet (500 mg total) by mouth 3 (three) times a day as needed for muscle spasms (Chest pain)   Patient not taking: Reported on 3/17/2021   nicotine (NICODERM CQ) 14 mg/24hr TD 24 hr patch  Self No No   Sig: Place 1 patch on the skin every 24 hours   Patient not taking: Reported on 3/18/2021   sertraline (ZOLOFT) 25 mg tablet  Self No No   Sig: Take 1 tablet (25 mg total) by mouth daily for 7 days, THEN 2 tablets (50 mg total) daily for 28 days  Patient not taking: Reported on 3/18/2021      Facility-Administered Medications: None       Past Medical History:   Diagnosis Date    Chest pain 1/12/2021    Diabetes mellitus ([facility] [address] )     Exposure to SARS-associated coronavirus 10/5/2020    Hypertension     Viral upper respiratory tract infection 10/7/2020       History reviewed  No pertinent surgical history  Family History   Problem Relation Age of Onset    Diabetes Mother     Diabetes Father      I have reviewed and agree with the history as documented      E-Cigarette/Vaping    E-Cigarette Use Never User      E-Cigarette/Vaping Substances     Social History     Tobacco Use    Smoking status: Current Every Day Smoker     Packs/day: 1 00     Years: 25 00     Pack years: 25 00     Types: Cigarettes     Start date: 2/18/1995    Smokeless tobacco: Never Used   Substance Use Topics    Alcohol use: Yes     Frequency: 2-3 times a week     Drinks per session: 1 or 2     Binge frequency: Never    Drug use: Never       Review of Systems   Constitutional: Negative for chills, diaphoresis and fever  HENT: Negative for nosebleeds, sore throat and trouble swallowing  Eyes: Negative for photophobia, pain and visual disturbance  Respiratory: Negative for cough, chest tightness and shortness of breath  Cardiovascular: Positive for chest pain  Negative for palpitations and leg swelling  Gastrointestinal: Negative for abdominal pain, constipation, diarrhea, nausea and vomiting  Endocrine: Negative for polydipsia and polyuria  Genitourinary: Negative for difficulty urinating, dysuria and hematuria  Musculoskeletal: Negative for back pain, neck pain and neck stiffness  Skin: Negative for pallor and rash  Neurological: Positive for numbness (resolved)  Negative for dizziness, syncope, light-headedness and headaches  All other systems reviewed and are negative  Physical Exam  Physical Exam  Vitals signs and nursing note reviewed  Constitutional:       General: He is not in acute distress  Appearance: He is well-developed  HENT:      Head: Normocephalic and atraumatic  Eyes:      Pupils: Pupils are equal, round, and reactive to light  Neck:      Musculoskeletal: Normal range of motion and neck supple  Cardiovascular:      Rate and Rhythm: Normal rate and regular rhythm  Pulses: Normal pulses  Heart sounds: Normal heart sounds  Pulmonary:      Effort: Pulmonary effort is normal  No respiratory distress  Breath sounds: Normal breath sounds  Abdominal:      General: There is no distension  Palpations: Abdomen is soft  Abdomen is not rigid  Tenderness: There is no abdominal tenderness  There is no guarding or rebound  Musculoskeletal: Normal range of motion  General: No tenderness     Lymphadenopathy: Cervical: No cervical adenopathy  Skin:     General: Skin is warm and dry  Capillary Refill: Capillary refill takes less than 2 seconds  Neurological:      Mental Status: He is alert and oriented to person, place, and time  Cranial Nerves: No cranial nerve deficit  Sensory: No sensory deficit           Vital Signs  ED Triage Vitals   Temperature Pulse Respirations Blood Pressure SpO2   03/28/21 1714 03/28/21 1714 03/28/21 1714 03/28/21 1715 03/28/21 1714   97 7 °F (36 5 °C) 73 18 (!) 174/84 97 %      Temp Source Heart Rate Source Patient Position - Orthostatic VS BP Location FiO2 (%)   03/28/21 1714 03/28/21 1714 03/28/21 2002 03/28/21 2002 --   Oral Monitor Sitting Right arm       Pain Score       --                  Vitals:    03/28/21 1714 03/28/21 1715 03/28/21 2002 03/28/21 2150   BP:  (!) 174/84 147/83 142/76   Pulse: 73  55 (!) 54   Patient Position - Orthostatic VS:   Sitting Sitting         Visual Acuity      ED Medications  Medications   sodium chloride (PF) 0 9 % injection 3 mL (3 mL Intravenous Given 3/28/21 2018)       Diagnostic Studies  Results Reviewed     Procedure Component Value Units Date/Time    Troponin I [[id number]]  (Normal) Collected: 03/28/21 2113    Lab Status: Final result Specimen: Blood from Arm, Left Updated: 03/28/21 2142     Troponin I <0 02 ng/mL     Troponin I [[id number]]  (Normal) Collected: 03/28/21 1733    Lab Status: Final result Specimen: Blood from Arm, Left Updated: 03/28/21 1802     Troponin I <0 02 ng/mL     Basic metabolic panel [[id number]] Collected: 03/28/21 1733    Lab Status: Final result Specimen: Blood from Arm, Left Updated: 03/28/21 1753     Sodium 141 mmol/L      Potassium 4 0 mmol/L      Chloride 104 mmol/L      CO2 31 mmol/L      ANION GAP 6 mmol/L      BUN 13 mg/dL      Creatinine 1 09 mg/dL      Glucose 117 mg/dL      Calcium 9 5 mg/dL      eGFR 95 ml/min/1 73sq m     Narrative:      [place] guidelines for Chronic Kidney Disease (CKD):     Stage 1 with normal or high GFR (GFR > 90 mL/min/1 73 square meters)    Stage 2 Mild CKD (GFR = 60-89 mL/min/1 73 square meters)    Stage 3A Moderate CKD (GFR = 45-59 mL/min/1 73 square meters)    Stage 3B Moderate CKD (GFR = 30-44 mL/min/1 73 square meters)    Stage 4 Severe CKD (GFR = 15-29 mL/min/1 73 square meters)    Stage 5 End Stage CKD (GFR <15 mL/min/1 73 square meters)  Note: GFR calculation is accurate only with a steady state creatinine    CBC and differential [[id number]]  (Abnormal) Collected: 03/28/21 1733    Lab Status: Final result Specimen: Blood from Arm, Left Updated: 03/28/21 1740     WBC 8 28 Thousand/uL      RBC 5 00 Million/uL      Hemoglobin 13 2 g/dL      Hematocrit 42 1 %      MCV 84 fL      MCH 26 4 pg      MCHC 31 4 g/dL      RDW 14 6 %      MPV 9 8 fL      Platelets 606 Thousands/uL      nRBC 0 /100 WBCs      Neutrophils Relative 59 %      Immat GRANS % 1 %      Lymphocytes Relative 28 %      Monocytes Relative 10 %      Eosinophils Relative 1 %      Basophils Relative 1 %      Neutrophils Absolute 4 93 Thousands/µL      Immature Grans Absolute 0 04 Thousand/uL      Lymphocytes Absolute 2 32 Thousands/µL      Monocytes Absolute 0 84 Thousand/µL      Eosinophils Absolute 0 08 Thousand/µL      Basophils Absolute 0 07 Thousands/µL                  X-ray chest 1 view portable   ED Interpretation by Dada Edge DO (03/28 1801)   No widened mediastinum  No infiltrates  No cardiomegaly  Procedures  ECG 12 Lead Documentation Only    Date/Time: 3/28/2021 5:37 PM  Performed by: Dada Edge DO  Authorized by: Dada Edge DO     ECG reviewed by me, the ED Provider: yes    Patient location:  ED  Previous ECG:     Previous ECG:  Compared to current    Similarity:  No change    Comparison to cardiac monitor: Yes    Comments:      Normal sinus rhythm at a rate of 62 beats per minute  Normal intervals  Normal axis  Normal QRS  Nonspecific ST T wave abnormalities  Similar to previous from 03/15/2021  ECG 12 Lead Documentation Only    Date/Time: 3/28/2021 9:36 PM  Performed by: Wilda Vargas DO  Authorized by: Wilda Vargas DO     ECG reviewed by me, the ED Provider: yes    Patient location:  ED  Previous ECG:     Previous ECG:  Compared to current    Similarity:  Changes noted    Comparison to cardiac monitor: Yes    Comments:      Sinus bradycardia rate of 52 beats per minute  Normal intervals  Normal Axis  Normal QRS  No ST T wave abnormalities  Rate change from previous EKG from 03/28/2021  ED Course  ED Course as of Mar 28 2345   Layla Morales Mar 28, 2021   1806 Reviewed previous medical records  Patient has presented similar left-sided numbness previously  I have low suspicion for TIA/CVA  NIHSS = 0        1836 Patient had CTA head and neck within the past 2 weeks and it was unremarkable  He also had a CTA chest/abdomen/pelvis which revealed no dissection  HEART Risk Score      Most Recent Value   Heart Score Risk Calculator   History  1 Filed at: 03/28/2021 1805   ECG  0 Filed at: 03/28/2021 1805   Age  0 Filed at: 03/28/2021 1805   Risk Factors  2 Filed at: 03/28/2021 1805   Troponin  0 Filed at: 03/28/2021 1805   HEART Score  3 Filed at: 03/28/2021 1805                                    MDM  Number of Diagnoses or Management Options  Atypical chest pain: new and requires workup  Diagnosis management comments: Patient presents with an episode of chest pain as well as left-sided numbness  Symptoms resolved prior to arrival   He has had similar episodes previously  On previous ED visits, he has had an unremarkable CTA head and neck  He has also had a negative CTA chest/abdomen/pelvis  He has followed up with cardiology who believes that his pain is atypical for ACS  He had a normal echocardiogram recently  He is scheduled for a stress test within the next week  HEART score is 3   EKG x2 with no evidence of ischemia  Troponin x2 negative  Patient remains asymptomatic and is stable for outpatient follow-up  Do not suspect TIA/CVA  Also do not suspect ACS  Patient is very well-appearing and stable for discharge  He agrees to return to ED immediately if symptoms recur  Amount and/or Complexity of Data Reviewed  Clinical lab tests: ordered and reviewed  Tests in the radiology section of CPT®: ordered and reviewed  Tests in the medicine section of CPT®: ordered and reviewed  Review and summarize past medical records: yes  Independent visualization of images, tracings, or specimens: yes    Risk of Complications, Morbidity, and/or Mortality  Presenting problems: high  Diagnostic procedures: moderate  Management options: moderate    Patient Progress  Patient progress: resolved      Disposition  Final diagnoses:   Atypical chest pain     Time reflects when diagnosis was documented in both MDM as applicable and the Disposition within this note     Time User Action Codes Description Comment    3/28/2021  9:44 PM Cinthya Reap Add [R07 89] Atypical chest pain       ED Disposition     ED Disposition Condition Date/Time Comment    Discharge Stable Sun Mar 28, 2021  9:44 PM Chelsey Goodwin discharge to home/self care              Follow-up Information     Follow up With Specialties Details Why Contact Info    Merary Souza MD Internal Medicine Schedule an appointment as soon as possible for a visit  Return to ED sooner if symptoms worsen or persist  [address] [facility]  [phone]            Discharge Medication List as of 3/28/2021  9:44 PM      CONTINUE these medications which have NOT CHANGED    Details   amLODIPine (NORVASC) 5 mg tablet Take 1 tablet (5 mg total) by mouth daily, Starting Tue 3/9/2021, Normal      aspirin (ECOTRIN LOW STRENGTH) 81 mg EC tablet Take 1 tablet (81 mg total) by mouth daily, Starting Tue 3/9/2021, Normal      atorvastatin (LIPITOR) 20 mg tablet Take 1 tablet (20 mg total) by mouth daily, Starting Tue 3/9/2021, Normal      Blood Glucose Monitoring Suppl (ONE TOUCH ULTRA 2) w/Device KIT by Does not apply route 3 (three) times a day, Starting Thu 9/3/2020, Normal      Blood Pressure Monitoring (Blood Pressure Monitor/M Cuff) MISC Use 2 (two) times a day, Starting Wed 3/10/2021, Normal      glucose blood (OneTouch Ultra) test strip Use as instructed, Normal      Lancets (onetouch ultrasoft) lancets Use as instructed, Normal      magnesium gluconate (MAGONATE) 500 mg tablet Take 1 tablet (500 mg total) by mouth daily, Starting Wed 3/17/2021, Normal      metFORMIN (GLUCOPHAGE-XR) 500 mg 24 hr tablet Take 1 tablet (500 mg total) by mouth daily with lunch, Starting Tue 3/9/2021, Normal      methocarbamol (ROBAXIN) 500 mg tablet Take 1 tablet (500 mg total) by mouth 3 (three) times a day as needed for muscle spasms (Chest pain), Starting Tue 1/12/2021, Normal      nicotine (NICODERM CQ) 14 mg/24hr TD 24 hr patch Place 1 patch on the skin every 24 hours, Starting Wed 3/17/2021, No Print      sertraline (ZOLOFT) 25 mg tablet Multiple Dosages:Starting Wed 3/17/2021, Last dose on Tue 3/23/2021, THEN Starting Wed 3/24/2021, Last dose on Tue 4/20/2021Take 1 tablet (25 mg total) by mouth daily for 7 days, THEN 2 tablets (50 mg total) daily for 28 days  , Normal           No discharge procedures on file      PDMP Review     None          ED Provider  Electronically Signed by           Bunny Presley DO  03/28/21 4431

## 2021-03-29 LAB
ATRIAL RATE: 62 BPM
P AXIS: 47 DEGREES
PR INTERVAL: 160 MS
QRS AXIS: 68 DEGREES
QRSD INTERVAL: 90 MS
QT INTERVAL: 406 MS
QTC INTERVAL: 412 MS
T WAVE AXIS: 72 DEGREES
VENTRICULAR RATE: 62 BPM

## 2021-03-29 PROCEDURE — 93010 ELECTROCARDIOGRAM REPORT: CPT | Performed by: INTERNAL MEDICINE

## 2021-03-30 ENCOUNTER — TELEPHONE (OUTPATIENT)
Dept: NON INVASIVE DIAGNOSTICS | Facility: CLINIC | Age: 46
End: 2021-03-30

## 2021-04-01 ENCOUNTER — HOSPITAL ENCOUNTER (OUTPATIENT)
Dept: NON INVASIVE DIAGNOSTICS | Facility: CLINIC | Age: 46
Discharge: HOME | End: 2021-04-01
Payer: COMMERCIAL

## 2021-04-01 DIAGNOSIS — R07.9 CHEST PAIN: ICD-10-CM

## 2021-04-01 PROCEDURE — 93017 CV STRESS TEST TRACING ONLY: CPT

## 2021-04-01 PROCEDURE — 93018 CV STRESS TEST I&R ONLY: CPT | Performed by: INTERNAL MEDICINE

## 2021-04-01 PROCEDURE — 93016 CV STRESS TEST SUPVJ ONLY: CPT | Performed by: INTERNAL MEDICINE

## 2021-04-02 ENCOUNTER — TELEPHONE (OUTPATIENT)
Dept: INTERNAL MEDICINE CLINIC | Facility: CLINIC | Age: 46
End: 2021-04-02

## 2021-04-02 LAB
CHEST PAIN STATEMENT: NORMAL
MAX DIASTOLIC BP: 76 MMHG
MAX HEART RATE: 166 BPM
MAX PREDICTED HEART RATE: 176 BPM
MAX. SYSTOLIC BP: 184 MMHG
PROTOCOL NAME: NORMAL
REASON FOR TERMINATION: NORMAL
TARGET HR FORMULA: NORMAL
TEST INDICATION: NORMAL
TIME IN EXERCISE PHASE: NORMAL

## 2021-04-05 ENCOUNTER — TELEPHONE (OUTPATIENT)
Dept: INTERNAL MEDICINE CLINIC | Facility: CLINIC | Age: 46
End: 2021-04-05

## 2021-04-05 NOTE — TELEPHONE ENCOUNTER
I called patient to inform results of echo and stress test, left voicemail to call back, or we can discuss next office visit in 1 week

## 2021-04-06 ENCOUNTER — TELEPHONE (OUTPATIENT)
Dept: INTERNAL MEDICINE CLINIC | Facility: CLINIC | Age: 46
End: 2021-04-06

## 2021-04-06 NOTE — TELEPHONE ENCOUNTER
Kallie Alexandre left a voicemail regarding his [place] paperwork  Yaniquedimitrios Saldivar had questions regarding his paperwork and had wanted to know if he would need an appointment to discuss his questions or if a telephone call is all that is needed  Will call back to clarify his questions

## 2021-04-08 ENCOUNTER — TELEPHONE (OUTPATIENT)
Dept: INTERNAL MEDICINE CLINIC | Facility: CLINIC | Age: 46
End: 2021-04-08

## 2021-04-08 ENCOUNTER — TELEMEDICINE (OUTPATIENT)
Dept: INTERNAL MEDICINE CLINIC | Facility: CLINIC | Age: 46
End: 2021-04-08
Payer: COMMERCIAL

## 2021-04-08 DIAGNOSIS — B34.9 VIRAL INFECTION, UNSPECIFIED: ICD-10-CM

## 2021-04-08 DIAGNOSIS — Z03.818 ENCOUNTER FOR OBSERVATION FOR SUSPECTED EXPOSURE TO OTHER BIOLOGICAL AGENTS RULED OUT: ICD-10-CM

## 2021-04-08 PROCEDURE — U0005 INFEC AGEN DETEC AMPLI PROBE: HCPCS | Performed by: INTERNAL MEDICINE

## 2021-04-08 PROCEDURE — 99213 OFFICE O/P EST LOW 20 MIN: CPT | Performed by: INTERNAL MEDICINE

## 2021-04-08 PROCEDURE — U0003 INFECTIOUS AGENT DETECTION BY NUCLEIC ACID (DNA OR RNA); SEVERE ACUTE RESPIRATORY SYNDROME CORONAVIRUS 2 (SARS-COV-2) (CORONAVIRUS DISEASE [COVID-19]), AMPLIFIED PROBE TECHNIQUE, MAKING USE OF HIGH THROUGHPUT TECHNOLOGIES AS DESCRIBED BY CMS-2020-01-R: HCPCS | Performed by: INTERNAL MEDICINE

## 2021-04-08 NOTE — PROGRESS NOTES
COVID-19 Outpatient Progress Note    Assessment/Plan:    Problem List Items Addressed This Visit     None      Visit Diagnoses     Encounter for observation for suspected exposure to other biological agents ruled out        Relevant Orders    Novel Coronavirus (Covid-19),PCR [facility] - Collected at [facility] or [facility]    Viral infection, unspecified        Relevant Orders    Novel Coronavirus (Covid-19),PCR [facility] - Collected at [place] [id number] or [facility]         Disposition:     I referred patient to one of our centralized sites for a COVID-19 swab  Recommended symptomatic management with Tylenol 1 g every 6 hours as needed or appropriate 600 mg every 6 hours as needed for pain or headaches  Multivitamins daily for now  Patient was instructed to self-isolate until results are back  Do not share utensils, towels, and have  bathroom  Patient should wipe all the surfaces that he comes in contact with, and use mask if cannot stay more than 6 ft apart from other people  Patient was advised to call us if there is any new or worsening symptoms including fever, productive cough or shortness of breath  If patient feels sick enough to call 911  Follow-up in 1 day  I have spent 16 minutes directly with the patient  Greater than 50% of this time was spent in counseling/coordination of care regarding: risks and benefits of treatment options, instructions for management, patient and family education and risk factor reductions          Encounter provider Brandi Putnam MD    Provider located at [address] [place] [address] [address] [place] [address]  [phone]    Recent Visits  Date Type Provider Dept   04/06/21 Telephone Ana Alvarez [facility] [place]   04/05/21 Telephone Brandi Putnam MD [facility] [place]   04/02/21 Telephone Teresa Robbins RN [facility] recent visits within past 7 days and meeting all other requirements     Today's Visits  Date Type Provider Dept   04/08/21 Telemedicine Jayant Yi MD [facility]   04/08/21 Telephone Nicki Scott [facility] [place]   Showing today's visits and meeting all other requirements     Future Appointments  No visits were found meeting these conditions  Showing future appointments within next 150 days and meeting all other requirements        Patient agrees to participate in a virtual check in via telephone or video visit instead of presenting to the office to address urgent/immediate medical needs  Patient is aware this is a billable service  After connecting through Telephone, the patient was identified by name and date of birth  Pawan Carmen was informed that this was a telemedicine visit and that the exam was being conducted confidentially over secure lines  My office door was closed  No one else was in the room  Pawan Carmen acknowledged consent and understanding of privacy and security of the telemedicine visit  I informed the patient that I have reviewed his record in Epic and presented the opportunity for him to ask any questions regarding the visit today  The patient agreed to participate  It was my intent to perform this visit via video technology but the patient was not able to do a video connection so the visit was completed via audio telephone only  Subjective:   Pawan Carmen is a 41 y o  male who is concerned about COVID-19  Patient's symptoms include chills, fatigue, malaise, nasal congestion, sore throat, chest tightness, abdominal pain, nausea, diarrhea and myalgias  Patient denies fever, rhinorrhea, anosmia, loss of taste, cough, shortness of breath, vomiting and headaches  Date of symptom onset: 4/8/2021  Date of exposure: 4/6/2021      Patient had an  High risk exposure to a co-worker  Suspected to have COVID-19 infection on Tuesday April 6        Lab Results   Component Value Date    SARSCOV2 Not Detected 10/26/2020     Past Medical History:   Diagnosis Date    Chest pain 1/12/2021    Diabetes mellitus ([facility] [address] )     Exposure to SARS-associated coronavirus 10/5/2020    Hypertension     Viral upper respiratory tract infection 10/7/2020     No past surgical history on file  Current Outpatient Medications   Medication Sig Dispense Refill    amLODIPine (NORVASC) 5 mg tablet Take 1 tablet (5 mg total) by mouth daily 30 tablet 5    aspirin (ECOTRIN LOW STRENGTH) 81 mg EC tablet Take 1 tablet (81 mg total) by mouth daily 90 tablet 2    atorvastatin (LIPITOR) 20 mg tablet Take 1 tablet (20 mg total) by mouth daily 90 tablet 3    Blood Glucose Monitoring Suppl (ONE TOUCH ULTRA 2) w/Device KIT by Does not apply route 3 (three) times a day 1 each 0    Blood Pressure Monitoring (Blood Pressure Monitor/M Cuff) MISC Use 2 (two) times a day 1 each 0    glucose blood (OneTouch Ultra) test strip Use as instructed 100 each 1    Lancets (onetouch ultrasoft) lancets Use as instructed 100 each 1    magnesium gluconate (MAGONATE) 500 mg tablet Take 1 tablet (500 mg total) by mouth daily (Patient not taking: Reported on 3/18/2021) 30 tablet 2    metFORMIN (GLUCOPHAGE-XR) 500 mg 24 hr tablet Take 1 tablet (500 mg total) by mouth daily with lunch (Patient not taking: Reported on 3/18/2021) 90 tablet 3    methocarbamol (ROBAXIN) 500 mg tablet Take 1 tablet (500 mg total) by mouth 3 (three) times a day as needed for muscle spasms (Chest pain) (Patient not taking: Reported on 3/17/2021) 15 tablet 0    nicotine (NICODERM CQ) 14 mg/24hr TD 24 hr patch Place 1 patch on the skin every 24 hours (Patient not taking: Reported on 3/18/2021) 14 patch 0    sertraline (ZOLOFT) 25 mg tablet Take 1 tablet (25 mg total) by mouth daily for 7 days, THEN 2 tablets (50 mg total) daily for 28 days  (Patient not taking: Reported on 3/18/2021) 63 tablet 0     No current facility-administered medications for this visit        No Known Allergies    Review of Systems   Constitutional: Positive for chills and fatigue  Negative for fever  HENT: Positive for congestion and sore throat  Negative for rhinorrhea  Respiratory: Positive for chest tightness  Negative for cough and shortness of breath  Cardiovascular: Negative for chest pain, palpitations and leg swelling  Gastrointestinal: Positive for abdominal pain, diarrhea and nausea  Negative for vomiting  Genitourinary: Negative for difficulty urinating, frequency and hematuria  Musculoskeletal: Positive for myalgias  Skin: Negative for rash  Neurological: Negative for dizziness, light-headedness and headaches  Psychiatric/Behavioral: Negative for confusion and sleep disturbance  Objective: There were no vitals filed for this visit  Patient sounded in no distress over the phone, no conversational dyspnea, no audible wheezing or cough during our conversation  Patient was alert and oriented, able to answer all questions appropriately  VIRTUAL VISIT DISCLAIMER    Rosilyn Gitelman acknowledges that he has consented to an online visit or consultation  He understands that the online visit is based solely on information provided by him, and that, in the absence of a face-to-face physical evaluation by the physician, the diagnosis he receives is both limited and provisional in terms of accuracy and completeness  This is not intended to replace a full medical face-to-face evaluation by the physician  Rosilyn Gitelman understands and accepts these terms

## 2021-04-08 NOTE — PATIENT INSTRUCTIONS
Take  Multivitamins daily  Take Tylenol 1 g every 6 hours as needed or ibuprofen 600 mg every 6 hours as needed for pain, fever or headaches      COVID-19 (Coronavirus Disease 2019)   WHAT YOU NEED TO KNOW:   COVID-19 is the disease caused by the novel (new) coronavirus first discovered in December 2019  Coronaviruses generally cause upper respiratory (nose, throat, and lung) infections, such as a cold  The new virus can also cause serious lower respiratory conditions, such as pneumonia or acute respiratory distress syndrome (ARDS)  Anyone can develop serious problems from the new virus, but your risk is higher if you are 65 or older  A weak immune system, diabetes, or a heart or lung condition can also increase your risk  DISCHARGE INSTRUCTIONS:   If you think you or someone you know may be infected:  Do the following to protect others:  · If emergency care is needed,  tell the  about the possible infection, or call ahead and tell the emergency department  · Call a healthcare provider  for instructions if symptoms are mild  Anyone who may be infected should not  arrive without calling first  The provider will need to protect staff members and other patients  · The person who may be infected needs to wear a face covering  while getting medical care  This will help lower the risk of infecting others  Coverings are not used for anyone who is younger than 2 years, has breathing problems, or cannot remove it  The provider can give you instructions for anyone who cannot wear a covering  Call your local emergency number (911 in the [address] [place] [address],[address]) or go to the emergency department if:   · You have trouble breathing or shortness of breath at rest     · You have chest pain or pressure that lasts longer than 5 minutes  · You become confused or hard to wake  · Your lips or face are blue  · You have a fever of 104°F (40°C) or higher      Call your doctor if:   · You do not  have symptoms of COVID-19 but had close physical contact within 14 days with someone who tested positive  · You have questions or concerns about your condition or care  Medicines: You may need any of the following for mild symptoms:  · Decongestants  help reduce nasal congestion and help you breathe more easily  If you take decongestant pills, they may make you feel restless or cause problems with your sleep  Do not use decongestant sprays for more than a few days  · Cough suppressants  help reduce coughing  Ask your healthcare provider which type of cough medicine is best for you  · Acetaminophen  decreases pain and fever  It is available without a doctor's order  Ask how much to take and how often to take it  Follow directions  Read the labels of all other medicines you are using to see if they also contain acetaminophen, or ask your doctor or pharmacist  Acetaminophen can cause liver damage if not taken correctly  Do not use more than 4 grams (4,000 milligrams) total of acetaminophen in one day  · NSAIDs , such as ibuprofen, help decrease swelling, pain, and fever  NSAIDs can cause stomach bleeding or kidney problems in certain people  If you take blood thinner medicine, always ask your healthcare provider if NSAIDs are safe for you  Always read the medicine label and follow directions  · Take your medicine as directed  Contact your healthcare provider if you think your medicine is not helping or if you have side effects  Tell him or her if you are allergic to any medicine  Keep a list of the medicines, vitamins, and herbs you take  Include the amounts, and when and why you take them  Bring the list or the pill bottles to follow-up visits  Carry your medicine list with you in case of an emergency  How the 2019 coronavirus spreads: The virus spreads quickly and easily  You can become infected if you are in contact with a large amount of the virus, even for a short time   You can also become infected by being around a small amount of virus for a long time  The following are ways the virus is thought to spread, but more information may be coming:  · Droplets are the most common way all coronaviruses spread  The virus can travel in droplets that form when a person talks, coughs, or sneezes  Anyone who breathes in the droplets or gets them in his or her eyes can become infected with the virus  Close personal contact with an infected person is thought to be the main way the virus spreads  Close personal contact means you are within 6 feet (2 meters) of the person  · Person-to-person contact can spread the virus  For example, a person with the virus on his or her hands can spread it by shaking hands with someone  At this time, it does not appear that the virus can be passed to a baby during pregnancy or delivery  The baby can be infected after he or she is born through person-to-person contact  The virus also does not appear to spread in breast milk  If you are pregnant or breastfeeding, talk to your healthcare provider or obstetrician about any concerns you have  · The virus can stay on objects and surfaces  A person can get the virus on his or her hands by touching the object or surface  Infection happens if the person then touches his or her eyes or mouth with unwashed hands  It is not yet known how long the virus can stay on an object or surface  That is why it is important to clean all surfaces that are used regularly  · An infected animal may be able to infect a person who touches it  This may happen at live markets or on a farm  How everyone can lower the risk for COVID-19:  The best way to prevent infection is to avoid anyone who is infected, but this can be hard to do  An infected person can spread the virus before signs or symptoms begin, or even if signs or symptoms never develop  The following can help lower the risk for infection:      · Wash your hands often throughout the day  Use soap and water   Rub your soapy hands together, lacing your fingers  Wash the front and back of each hand, and in between your fingers  Use the fingers of one hand to scrub under the fingernails of the other hand  Wash for at least 20 seconds  Rinse with warm, running water for several seconds  Then dry your hands with a clean towel or paper towel  Use hand  that contains alcohol if soap and water are not available  Do not touch your eyes, nose, or mouth without washing your hands first  Teach children how to wash their hands and use hand   · Cover a sneeze or cough  This prevents droplets from traveling from you to others  Turn your face away and cover your mouth and nose with a tissue  Throw the tissue away  Use the bend of your arm if a tissue is not available  Then wash your hands well with soap and water or use hand   Turn and cover your face if you are around someone who is sneezing or coughing  Teach children how to cover a cough or sneeze  · Follow worldwide, national, and local social distancing guidelines  Social distancing means people avoid close physical contact so the virus cannot spread from one person to another  Keep at least 6 feet (2 meters) between you and others  Also keep this distance from anyone who comes to your home, such as someone making a delivery  · Make a habit of not touching your face  It is not known how long the virus can stay on objects and surfaces  If you get the virus on your hands, you can transfer it to your eyes, nose, or mouth and become infected  You can also transfer it to objects, surfaces, or people  Be aware of what you touch when you go out  Examples include handrails and elevator buttons  Try not to touch anything with bare hands unless it is necessary  Wash your hands before you leave your home and when you return  · Clean and disinfect high-touch surfaces and objects often  Use a disinfecting solution or wipes   You can make a solution by diluting 4 teaspoons of bleach in 1 quart (4 cups) of water  Clean and disinfect even if you think no one living in or coming to your home is infected with the virus  You can wipe items with a disinfecting cloth before you bring them into your home  Wash your hands after you handle anything you bring into your home  · Make your immune system as healthy as possible  A weakened immune system makes you more vulnerable to the new coronavirus  No COVID-19 vaccine is available yet  Vaccines such as the flu and pneumonia vaccines can help your immune system  Your healthcare provider can tell you which vaccines to get, and when to get them  Keep your immune system as strong as possible  Do not smoke  Eat healthy foods, exercise regularly, and try to manage stress  Go to bed and wake up at the same times each day  Social distancing guidelines:  National and local social distancing rules vary  Rules may change over time as restrictions are lifted  Restrictions may return if an outbreak happens where you live  It is important to know and follow all current social distancing rules in your area  The following are general guidelines:  · Limit trips out of your home  You may be able to have food, medicines, and other supplies delivered  If possible, have delivered items left at your door or other area  Try not to have someone hand you an item  You will be so close to the person that the virus can spread between you  · Do not have close physical contact with anyone who does not live in your home  Do not shake hands with, hug, or kiss a person as a greeting  Stand or walk as far from others as possible  If you must use public transportation (such as a bus or subway), try to sit or stand away from others  You can stay safely connected with others through phone calls, e-mail messages, social media websites, and video chats  Check in on anyone who may be having a hard time socially distancing, or who lives alone   Ask administrators at nursing homes or long-term care facilities how you can safely communicate with someone living there  · Wear a cloth face covering around others who do not live in your home  Face coverings help prevent the virus from spreading to others in droplets  You can use a clear face covering if someone needs to read your lips  This is a cloth covering that has plastic over the mouth area so your lips can be seen  Do not use coverings that have breathing valves or vents  The virus can travel out of the valve or vent and be spread to others  Do not take your covering off to talk, cough, or sneeze  Do not use coverings on children younger than 2 years or on anyone who has breathing problems or cannot remove it  · Only allow medical or other necessary professionals into your home  Wear your face covering, and remind professionals to wear a face covering  Remind them to wash their hands when they arrive and before they leave  Do not  let anyone who does not live in your home in, even if the person is not sick  A person can pass the virus to others before symptoms of COVID-19 begin  Some people never even develop symptoms  Children commonly have mild symptoms or no symptoms  It may be hard to tell a child not to hug or kiss you  Explain that this is how he or she can help you stay healthy  · Do not go to someone else's home unless it is necessary  Do not go over to visit, even if the person is lonely  Only go if you need to help him or her  Make sure you both wear face coverings while you are there  · Avoid large gatherings and crowds  Gatherings or crowds of 10 or more individuals can cause the virus to spread  Examples of gatherings include parties, sporting events, [employer] services, and conferences  Crowds may form at beaches, hill, and tourist attractions  Protect yourself by staying away from large gatherings and crowds  · Ask your healthcare provider for other ways to have appointments    You may be able to have appointments without having to go into the provider's office  Some providers offer phone, video, or other types of appointments  You may also be able to get prescriptions for a few months of your medicines at a time  · Stay safe if you must go out to work  You may have a job that can only be done outside your home  Keep physical distance between you and other workers as much as possible  Follow your employer's rules so everyone stays safe  If you have COVID-19 and are recovering at home:  Healthcare providers will give you specific instructions to follow  The following are general guidelines to remind you how to keep others safe until you are well:  · Wash your hands often  Use soap and water as much as possible  You can use hand  that contains alcohol if soap and water are not available  Do not share towels with anyone  If you use paper towels, throw them away in a lined trash can kept in your room or area  Use a covered trash can, if possible  · Do not go out of your home unless it is necessary  You may have to go to your healthcare provider's office for check-ups or to get prescription refills  Do not arrive at the provider's office without an appointment  Providers have to make their offices safe for staff and other patients  · Do not have close physical contact with anyone unless it is necessary  Only have close physical contact with a person giving direct care, or a baby or child you must care for  Family members and friends should not visit you  If possible, stay in a separate area or room of your home if you live with others  No one should go into the area or room except to give you care  You can visit with others by phone, video chat, e-mail, or similar systems  It is important to stay connected with others in your life while you recover  · Wear a face covering while others are near you    This can help prevent droplets from spreading the virus when you talk, sneeze, or cough  Put the covering on before anyone comes into your room or area  Remind the person to cover his or her nose and mouth before going in to provide care for you  · Do not share items  Do not share dishes, towels, or other items with anyone  Items need to be washed after you use them  · Protect your baby  Wash your hands with soap and water often throughout the day  Wear a clean face covering while you breastfeed, or while you express or pump breast milk  If possible, ask someone who is well to care for your baby  You can put breast milk in bottles for the person to use, if needed  Talk to your healthcare provider if you have any questions or concerns about caring for or bonding with your baby  He or she will tell you when to bring your baby in for check-ups and vaccines  He or she will also tell you what to do if you think your baby was infected with the new virus  · Do not handle live animals  Until more is known, it is best not to touch, play with, or handle live animals  Some animals, including pets, have been infected with the new coronavirus  Do not handle or care for animals until you are well  Care includes feeding, petting, and cuddling your pet  Do not let your pet lick you or share your food  Ask someone who is not infected to take care of your pet, if possible  If you must care for a pet, wear a face covering  Wash your hands before and after you give care  · Follow directions from your healthcare provider for being around others after you recover  You will need to wait at least 10 days after symptoms first appeared  Then you will need to have no fever for 24 hours without fever medicine, and no other symptoms  A loss of taste or smell may continue for several months  It is considered okay to be around others if this is your only symptom  It is not known for sure if or for how long a recovered person can pass the virus to others   Your provider may give you instructions, such as continuing social distancing or wearing a face covering around others  How to take care of someone who has COVID-19:  If the person lives in another home, arrange for a time to give care  Remember to bring a few pairs of disposable gloves and a cloth face covering  The following are general guidelines to help you safely care for anyone who has COVID-19:  · Wash your hands often  Wash before and after you go into the person's home, area, or room  Throw paper towels away in a lined trash can that has a lid, if possible  · Do not allow others to go near the person  No one should come into the person's home unless it is necessary  If possible, the person should be in a separate area or room if he or she lives with others  Keep the room's door shut unless you need to go in or out  Have others call, video chat, or e-mail the person if he or she is feeling well enough  The person may feel lonely if he or she is kept separate for a long period of time  Safe communication can help him or her stay connected to family and friends  · Make sure the person's room has good air flow  You may be able to open the window if the weather allows  An air conditioner can also be turned on to help air move  · Contact the person before you go in to give care  Make sure the person is wearing a face covering  Remind him or her to wash his or her hands with soap and water  He or she can use hand  that contains alcohol if soap and water are not available  Put on a face covering before you go in to give care  · Wear gloves while you give care and clean  Clean items the person uses often  Clean countertops, cooking surfaces, and the fronts and insides of the microwave and refrigerator  Clean the shower, toilet, the area around the toilet, the sink, the area around the sink, and faucets  Gather used laundry or bedding  Wash and dry items on the warmest settings the fabric allows   Wash dishes and silverware in hot, soapy water or in a   · Anything you throw away needs to go into a lined trash can  When you need to empty the trash, close the open end of the lining and tie it closed  This helps prevent items the virus is on from spilling out of the trash  Remove your gloves and throw them away  Wash your hands  Follow up with your doctor as directed:  Write down your questions so you remember to ask them during your visits  For more information:   · Centers for Disease Control and Prevention  [address] , [address]  Phone: 3- 464 - 918-8224  Web Address: [employer] com br    © Copyright [address] Information is for End User's use only and may not be sold, redistributed or otherwise used for commercial purposes  All illustrations and images included in CareNotes® are the copyrighted property of [employer]  or [facility] Keren Martinez   The above information is an  only  It is not intended as medical advice for individual conditions or treatments  Talk to your doctor, nurse or pharmacist before following any medical regimen to see if it is safe and effective for you

## 2021-04-09 ENCOUNTER — TELEMEDICINE (OUTPATIENT)
Dept: INTERNAL MEDICINE CLINIC | Facility: CLINIC | Age: 46
End: 2021-04-09
Payer: COMMERCIAL

## 2021-04-09 DIAGNOSIS — Z20.822 ENCOUNTER BY TELEHEALTH FOR SUSPECTED COVID-19: Primary | ICD-10-CM

## 2021-04-09 LAB — SARS-COV-2 RNA RESP QL NAA+PROBE: NEGATIVE

## 2021-04-09 PROCEDURE — 99212 OFFICE O/P EST SF 10 MIN: CPT | Performed by: HOSPITALIST

## 2021-04-09 NOTE — PROGRESS NOTES
Pt's covid 19 results-negative  Called and  updated him  Virtual Brief Visit    Assessment/Plan:    Problem List Items Addressed This Visit     None      URI-patient has tested negative for COVID-19  Being treated symptomatically for URI symptoms  Patient feels well  Reason for visit is   Chief Complaint   Patient presents with    Virtual Brief Visit    called to inform patient that his COVID test is negative and he mostly  has a viral URI  Continue symptomatic measures for now  Encouraged continuing wearing a mask and social distance feeling given the surgeon COVID-19 cases  Encounter provider Russ Sheehan MD    Provider located at [address] [place] [address] [address] [place] [address]  [phone]    Recent Visits  Date Type Provider Dept   04/08/21 Telemedicine Lorenzo Ornelas MD  [facility] [place]   04/08/21 Telephone Alyssa Ceja [facility] [place]   04/06/21 Telephone Alyssa Ceja  [facility] [place]   04/05/21 Telephone Lorenzo Ornelas MD  [facility] [place]   04/02/21 Telephone Nathaniel Colunga RN  [facility] [place]   Showing recent visits within past 7 days and meeting all other requirements     Today's Visits  Date Type Provider Dept   04/09/21 Telemedicine Russ Sheehan MD  [facility] [place]   Showing today's visits and meeting all other requirements     Future Appointments  No visits were found meeting these conditions  Showing future appointments within next 150 days and meeting all other requirements        After connecting through telephone, the patient was identified by name and date of birth  Guero Acuña was informed that this is a telemedicine visit and that the visit is being conducted through telephone  My office door was closed  No one else was in the room  He acknowledged consent and understanding of privacy and security of the platform   The patient has agreed to participate and understands he can discontinue the visit at any time  Patient is aware this is a billable service  Subjective    Ximena Grant is a 41 y o  male who was tested for COVID-19 yesterday after he called in with symptoms of chills, fatigue, malaise, nasal congestion, sore throat and some nausea  Had high risk exposure to co worker suspected to have COVID-19 on April 6th     Patient without any shortness of breath, diarrhea, loss of taste, loss of smell, headaches, chest pains  HPI     Past Medical History:   Diagnosis Date    Chest pain 1/12/2021    Diabetes mellitus ([facility] [address] )     Exposure to SARS-associated coronavirus 10/5/2020    Hypertension     Viral upper respiratory tract infection 10/7/2020       No past surgical history on file      Current Outpatient Medications   Medication Sig Dispense Refill    amLODIPine (NORVASC) 5 mg tablet Take 1 tablet (5 mg total) by mouth daily 30 tablet 5    aspirin (ECOTRIN LOW STRENGTH) 81 mg EC tablet Take 1 tablet (81 mg total) by mouth daily 90 tablet 2    atorvastatin (LIPITOR) 20 mg tablet Take 1 tablet (20 mg total) by mouth daily 90 tablet 3    Blood Glucose Monitoring Suppl (ONE TOUCH ULTRA 2) w/Device KIT by Does not apply route 3 (three) times a day 1 each 0    Blood Pressure Monitoring (Blood Pressure Monitor/M Cuff) MISC Use 2 (two) times a day 1 each 0    glucose blood (OneTouch Ultra) test strip Use as instructed 100 each 1    Lancets (onetouch ultrasoft) lancets Use as instructed 100 each 1    magnesium gluconate (MAGONATE) 500 mg tablet Take 1 tablet (500 mg total) by mouth daily (Patient not taking: Reported on 3/18/2021) 30 tablet 2    metFORMIN (GLUCOPHAGE-XR) 500 mg 24 hr tablet Take 1 tablet (500 mg total) by mouth daily with lunch (Patient not taking: Reported on 3/18/2021) 90 tablet 3    methocarbamol (ROBAXIN) 500 mg tablet Take 1 tablet (500 mg total) by mouth 3 (three) times a day as needed for muscle spasms (Chest pain) (Patient not taking: Reported on 3/17/2021) 15 tablet 0    nicotine (NICODERM CQ) 14 mg/24hr TD 24 hr patch Place 1 patch on the skin every 24 hours (Patient not taking: Reported on 3/18/2021) 14 patch 0    sertraline (ZOLOFT) 25 mg tablet Take 1 tablet (25 mg total) by mouth daily for 7 days, THEN 2 tablets (50 mg total) daily for 28 days  (Patient not taking: Reported on 3/18/2021) 63 tablet 0     No current facility-administered medications for this visit  No Known Allergies    Review of Systems all other review of symptoms negative unless specified otherwise    There were no vitals filed for this visit  On my interaction with him over the phone he appeared well with no signs of respiratory distress  There was no audible wheezing  Oz were relevant into the point  I spent 10 minutes with patient today in which greater than 50% of the time was spent in counseling/coordination of care     VIRTUAL VISIT [address] [employer]  acknowledges that he has consented to an online visit or consultation  He understands that the online visit is based solely on information provided by him, and that, in the absence of a face-to-face physical evaluation by the physician, the diagnosis he receives is both limited and provisional in terms of accuracy and completeness  This is not intended to replace a full medical face-to-face evaluation by the physician  Celeste Elizondo understands and accepts these terms

## 2021-04-13 ENCOUNTER — OFFICE VISIT (OUTPATIENT)
Dept: INTERNAL MEDICINE CLINIC | Facility: CLINIC | Age: 46
End: 2021-04-13
Payer: COMMERCIAL

## 2021-04-13 VITALS
HEART RATE: 79 BPM | BODY MASS INDEX: 26.64 KG/M2 | RESPIRATION RATE: 20 BRPM | SYSTOLIC BLOOD PRESSURE: 142 MMHG | DIASTOLIC BLOOD PRESSURE: 80 MMHG | OXYGEN SATURATION: 97 % | TEMPERATURE: 98.6 F | WEIGHT: 191 LBS

## 2021-04-13 DIAGNOSIS — E11.9 TYPE 2 DIABETES MELLITUS WITHOUT COMPLICATION, WITHOUT LONG-TERM CURRENT USE OF INSULIN (HCC): ICD-10-CM

## 2021-04-13 DIAGNOSIS — F41.9 ANXIETY: ICD-10-CM

## 2021-04-13 DIAGNOSIS — I10 ESSENTIAL HYPERTENSION: ICD-10-CM

## 2021-04-13 DIAGNOSIS — F17.210 CONTINUOUS DEPENDENCE ON CIGARETTE SMOKING: ICD-10-CM

## 2021-04-13 DIAGNOSIS — I10 HYPERTENSION, UNSPECIFIED TYPE: Primary | ICD-10-CM

## 2021-04-13 DIAGNOSIS — R91.1 LUNG NODULE < 6CM ON CT: ICD-10-CM

## 2021-04-13 PROBLEM — R42 LIGHTHEADEDNESS: Status: RESOLVED | Noted: 2021-03-09 | Resolved: 2021-04-13

## 2021-04-13 PROBLEM — Z03.818 ENCOUNTER FOR OBSERVATION FOR SUSPECTED EXPOSURE TO OTHER BIOLOGICAL AGENTS RULED OUT: Status: RESOLVED | Noted: 2021-04-08 | Resolved: 2021-04-13

## 2021-04-13 PROBLEM — IMO0001 LUNG NODULE < 6CM ON CT: Status: ACTIVE | Noted: 2021-04-13

## 2021-04-13 PROBLEM — B34.9 VIRAL INFECTION, UNSPECIFIED: Status: RESOLVED | Noted: 2021-04-08 | Resolved: 2021-04-13

## 2021-04-13 PROCEDURE — 3077F SYST BP >= 140 MM HG: CPT | Performed by: INTERNAL MEDICINE

## 2021-04-13 PROCEDURE — 3079F DIAST BP 80-89 MM HG: CPT | Performed by: INTERNAL MEDICINE

## 2021-04-13 PROCEDURE — 4004F PT TOBACCO SCREEN RCVD TLK: CPT | Performed by: INTERNAL MEDICINE

## 2021-04-13 PROCEDURE — 99214 OFFICE O/P EST MOD 30 MIN: CPT | Performed by: INTERNAL MEDICINE

## 2021-04-13 RX ORDER — VARENICLINE TARTRATE 1 MG/1
1 TABLET, FILM COATED ORAL 2 TIMES DAILY
Qty: 60 TABLET | Refills: 0 | Status: SHIPPED | OUTPATIENT
Start: 2021-04-13 | End: 2021-05-01 | Stop reason: HOSPADM

## 2021-04-13 RX ORDER — AMLODIPINE BESYLATE 10 MG/1
10 TABLET ORAL DAILY
Qty: 90 TABLET | Refills: 0 | Status: SHIPPED | OUTPATIENT
Start: 2021-04-13 | End: 2021-07-21 | Stop reason: SDUPTHER

## 2021-04-13 RX ORDER — VARENICLINE TARTRATE 25 MG
KIT ORAL
Qty: 53 TABLET | Refills: 0 | Status: SHIPPED | OUTPATIENT
Start: 2021-04-13 | End: 2021-05-01 | Stop reason: HOSPADM

## 2021-04-13 NOTE — ASSESSMENT & PLAN NOTE
Patient has cut down to 3-5 cigarettes a day, feels some anxiety with the nicotine patch  Patient has tried chantix before, and would like to try again  · Prescribed chantix pax and maintance 1mg dose for 12 weeks  · Discussed adverse effects and discussed that patient should call us if he experiences any SI or severe depression     · Will f/u in 4 weeks to see how patient is tolerating the chantix

## 2021-04-13 NOTE — ASSESSMENT & PLAN NOTE
Patient has been checking his BP daily and has been in 140s/80s, which is better than previous pressures of 160, but still not at goal      · Increase norvasc to 10 mg QD   · F/u for annual physical in 4 weeks

## 2021-04-13 NOTE — PROGRESS NOTES
Assessment/Plan:    Lung nodule < 6cm on CT  Patient had a 4 mm RLL lung nodule requiring monitoring in 12 months  Anxiety  Patient is currently taking zoloft 50 mg QD and has had no more episodes of chest pressure, palpitations and left arm numbness  Discussed that since patient is not having any more episode and is tolerating the medication well, we will continue zolfot at it's current dose  Continuous dependence on cigarette smoking  Patient has cut down to 3-5 cigarettes a day, feels some anxiety with the nicotine patch  Patient has tried chantix before, and would like to try again  · Prescribed chantix pax and maintance 1mg dose for 12 weeks  · Discussed adverse effects and discussed that patient should call us if he experiences any SI or severe depression  · Will f/u in 4 weeks to see how patient is tolerating the chantix    Hypertension  Patient has been checking his BP daily and has been in 140s/80s, which is better than previous pressures of 160, but still not at goal      · Increase norvasc to 10 mg QD   · F/u for annual physical in 4 weeks  Type 2 diabetes mellitus without complication, without long-term current use of insulin ([facility])    Lab Results   Component Value Date    HGBA1C 5 7 (H) 01/11/2021   Patient has been doing well and taking metformin 500 mg qd  · Continue metformin   · Will obtain A1c in 6 months       Diagnoses and all orders for this visit:    Hypertension, unspecified type  -     amLODIPine (NORVASC) 10 mg tablet; Take 1 tablet (10 mg total) by mouth daily    Type 2 diabetes mellitus without complication, without long-term current use of insulin ([facility])    Essential hypertension    Continuous dependence on cigarette smoking  -     varenicline (CHANTIX GIOVANI) 0 5 MG X 11 & 1 MG X 42 tablet; Take one 0 5 mg tablet by mouth once daily for 3 days, then one 0 5 mg tablet by mouth twice daily for 4 days, then one 1 mg tablet by mouth twice daily    -     varenicline (CHANTIX) 1 mg tablet; Take 1 tablet (1 mg total) by mouth 2 (two) times a day Start after completion of chantix pax ( varenicline)  Lung nodule < 6cm on CT  -     Cancel: CT chest wo contrast; Future  -     CT chest wo contrast; Future    Anxiety        Subjective:      Patient ID: Andreia Beck is a 41 y o  male  Here today for follow-up visit  Patient has been tolerating all his medications without any difficulties  Patient no longer has episodes chest pain/palpitations/left arm numbness  Patient checks his blood pressures daily, and has been checking his blood sugars daily as  Patient's blood pressures have been the 140s over 80s and blood sugars have always been between 100-130  Patient is here to discuss his recent cardiac stress test and echocardiogram     Patient has been cutting back on his smoking, and is interested today in other options to help him quit smoking  The following portions of the patient's history were reviewed and updated as appropriate: allergies, current medications, past family history, past medical history, past social history, past surgical history and problem list     Review of Systems   Constitutional: Negative for chills, fatigue and fever  HENT: Negative for ear discharge, ear pain and sore throat  Eyes: Negative for pain and discharge  Respiratory: Negative for apnea, chest tightness, shortness of breath and wheezing  Cardiovascular: Negative for chest pain and palpitations  Gastrointestinal: Negative for abdominal distention and abdominal pain  Endocrine: Negative for polyphagia and polyuria  Genitourinary: Negative for difficulty urinating, dysuria and frequency  Musculoskeletal: Negative for arthralgias and joint swelling  Neurological: Negative for dizziness, tremors, syncope, facial asymmetry, weakness, light-headedness and headaches  Hematological: Does not bruise/bleed easily     Psychiatric/Behavioral: Negative for agitation and behavioral problems  Objective:      /80 (BP Location: Left arm, Patient Position: Sitting, Cuff Size: Large)   Pulse 79   Temp 98 6 °F (37 °C)   Resp 20   Wt 86 6 kg (191 lb)   SpO2 97%   BMI 26 64 kg/m²        Physical Exam  Vitals signs reviewed  Constitutional:       Appearance: Normal appearance  HENT:      Head: Normocephalic and atraumatic  Cardiovascular:      Rate and Rhythm: Normal rate and regular rhythm  Pulses: Normal pulses  Heart sounds: No murmur  No gallop  Pulmonary:      Effort: Pulmonary effort is normal  No respiratory distress  Breath sounds: No stridor  No wheezing  Chest:      Chest wall: No tenderness  Abdominal:      General: Abdomen is flat  Bowel sounds are normal  There is no distension  Palpations: Abdomen is soft  Tenderness: There is no abdominal tenderness  Musculoskeletal:         General: No swelling  Right lower leg: No edema  Left lower leg: No edema  Skin:     General: Skin is warm and dry  Capillary Refill: Capillary refill takes less than 2 seconds  Comments: Significant bilateral clubbing   Neurological:      General: No focal deficit present  Mental Status: He is alert and oriented to person, place, and time  Cranial Nerves: No cranial nerve deficit  Motor: No weakness  Psychiatric:         Mood and Affect: Mood normal          Behavior: Behavior normal        Nutrition Assessment and Intervention:       Other interventions: Discussed with patient's importance cutting down red meat and dairy  Patient is going to try to stop eating cheese

## 2021-04-13 NOTE — ASSESSMENT & PLAN NOTE
Patient is currently taking zoloft 50 mg QD and has had no more episodes of chest pressure, palpitations and left arm numbness  Discussed that since patient is not having any more episode and is tolerating the medication well, we will continue zolfot at it's current dose

## 2021-04-13 NOTE — ASSESSMENT & PLAN NOTE
Lab Results   Component Value Date    HGBA1C 5 7 (H) 01/11/2021   Patient has been doing well and taking metformin 500 mg qd       · Continue metformin   · Will obtain A1c in 6 months

## 2021-04-16 ENCOUNTER — TELEPHONE (OUTPATIENT)
Dept: INTERNAL MEDICINE CLINIC | Facility: CLINIC | Age: 46
End: 2021-04-16

## 2021-04-16 NOTE — TELEPHONE ENCOUNTER
Ralph Rodas has called the office in regards to his [facility] paperwork  [employer] had called to ask if the [facility] paperwork had been signed and faxed  This had been done on 03/29/2021  [employer] still has questions regarding his [facility] paperwork which he would like to discuss with Dr Don Neal via telephone call  Asked Del if he would like to set up a virtual visit to discuss his concerns  [employer] said no as he had no other questions aside from the [facility] paperwork

## 2021-04-16 NOTE — TELEPHONE ENCOUNTER
I called the patient and I need to modify his FMLA paperwork, patient states he asked to fax it  to out office yesterday, please verify if the document is available and I will fill it  Thank you!

## 2021-04-17 DIAGNOSIS — F41.9 ANXIETY: ICD-10-CM

## 2021-04-30 ENCOUNTER — HOSPITAL ENCOUNTER (OUTPATIENT)
Facility: HOSPITAL | Age: 46
Setting detail: OBSERVATION
Discharge: HOME | End: 2021-05-01
Admitting: INTERNAL MEDICINE
Payer: COMMERCIAL

## 2021-04-30 DIAGNOSIS — R07.9 CHEST PAIN, UNSPECIFIED TYPE: Primary | ICD-10-CM

## 2021-04-30 DIAGNOSIS — R55 SYNCOPE, NEAR: ICD-10-CM

## 2021-04-30 DIAGNOSIS — R91.1 LUNG NODULE < 6CM ON CT: ICD-10-CM

## 2021-04-30 LAB
ALBUMIN SERPL BCP-MCNC: 4.5 G/DL (ref 3.4–4.8)
ALP SERPL-CCNC: 77.5 U/L (ref 10–129)
ALT SERPL W P-5'-P-CCNC: 33 U/L (ref 5–63)
ANION GAP SERPL CALCULATED.3IONS-SCNC: 7 MMOL/L (ref 4–13)
AST SERPL W P-5'-P-CCNC: 20 U/L (ref 15–41)
BASOPHILS # BLD AUTO: 0.04 THOUSANDS/ΜL (ref 0–0.1)
BASOPHILS NFR BLD AUTO: 1 % (ref 0–1)
BILIRUB SERPL-MCNC: 0.38 MG/DL (ref 0.3–1.2)
BUN SERPL-MCNC: 17 MG/DL (ref 6–20)
CALCIUM SERPL-MCNC: 9.5 MG/DL (ref 8.4–10.2)
CHLORIDE SERPL-SCNC: 101 MMOL/L (ref 96–108)
CO2 SERPL-SCNC: 29 MMOL/L (ref 22–33)
CREAT SERPL-MCNC: 1.08 MG/DL (ref 0.5–1.2)
D DIMER PPP FEU-MCNC: <0.19 MG/L FEU (ref 0.19–0.49)
EOSINOPHIL # BLD AUTO: 0.12 THOUSAND/ΜL (ref 0–0.61)
EOSINOPHIL NFR BLD AUTO: 2 % (ref 0–6)
ERYTHROCYTE [DISTWIDTH] IN BLOOD BY AUTOMATED COUNT: 15.4 % (ref 11.6–15.1)
GFR SERPL CREATININE-BSD FRML MDRD: 96 ML/MIN/1.73SQ M
GLUCOSE SERPL-MCNC: 110 MG/DL (ref 65–140)
HCT VFR BLD AUTO: 37.8 % (ref 36.5–49.3)
HGB BLD-MCNC: 12.3 G/DL (ref 12–17)
IMM GRANULOCYTES # BLD AUTO: 0.03 THOUSAND/UL (ref 0–0.2)
IMM GRANULOCYTES NFR BLD AUTO: 0 % (ref 0–2)
LIPASE SERPL-CCNC: 49 U/L (ref 13–60)
LYMPHOCYTES # BLD AUTO: 2.52 THOUSANDS/ΜL (ref 0.6–4.47)
LYMPHOCYTES NFR BLD AUTO: 33 % (ref 14–44)
MCH RBC QN AUTO: 26.9 PG (ref 26.8–34.3)
MCHC RBC AUTO-ENTMCNC: 32.5 G/DL (ref 31.4–37.4)
MCV RBC AUTO: 83 FL (ref 82–98)
MONOCYTES # BLD AUTO: 0.9 THOUSAND/ΜL (ref 0.17–1.22)
MONOCYTES NFR BLD AUTO: 12 % (ref 4–12)
NEUTROPHILS # BLD AUTO: 4.04 THOUSANDS/ΜL (ref 1.85–7.62)
NEUTS SEG NFR BLD AUTO: 52 % (ref 43–75)
PLATELET # BLD AUTO: 259 THOUSANDS/UL (ref 149–390)
PMV BLD AUTO: 9.7 FL (ref 8.9–12.7)
POTASSIUM SERPL-SCNC: 3.8 MMOL/L (ref 3.5–5)
PROT SERPL-MCNC: 7.4 G/DL (ref 6.4–8.3)
RBC # BLD AUTO: 4.58 MILLION/UL (ref 3.88–5.62)
SODIUM SERPL-SCNC: 137 MMOL/L (ref 133–145)
TROPONIN I SERPL-MCNC: <0.03 NG/ML (ref 0–0.07)
WBC # BLD AUTO: 7.65 THOUSAND/UL (ref 4.31–10.16)

## 2021-04-30 PROCEDURE — 99284 EMERGENCY DEPT VISIT MOD MDM: CPT

## 2021-04-30 PROCEDURE — 93005 ELECTROCARDIOGRAM TRACING: CPT

## 2021-04-30 PROCEDURE — 80053 COMPREHEN METABOLIC PANEL: CPT

## 2021-04-30 PROCEDURE — 83690 ASSAY OF LIPASE: CPT

## 2021-04-30 PROCEDURE — 84484 ASSAY OF TROPONIN QUANT: CPT | Performed by: INTERNAL MEDICINE

## 2021-04-30 PROCEDURE — 84484 ASSAY OF TROPONIN QUANT: CPT

## 2021-04-30 PROCEDURE — 85379 FIBRIN DEGRADATION QUANT: CPT

## 2021-04-30 PROCEDURE — 99285 EMERGENCY DEPT VISIT HI MDM: CPT

## 2021-04-30 PROCEDURE — 36415 COLL VENOUS BLD VENIPUNCTURE: CPT

## 2021-04-30 PROCEDURE — 85025 COMPLETE CBC W/AUTO DIFF WBC: CPT

## 2021-04-30 RX ORDER — UREA 10 %
500 LOTION (ML) TOPICAL DAILY
Status: DISCONTINUED | OUTPATIENT
Start: 2021-05-01 | End: 2021-05-01 | Stop reason: HOSPADM

## 2021-04-30 RX ORDER — ASPIRIN 81 MG/1
81 TABLET ORAL DAILY
Status: DISCONTINUED | OUTPATIENT
Start: 2021-05-01 | End: 2021-05-01 | Stop reason: HOSPADM

## 2021-04-30 RX ORDER — ONDANSETRON 2 MG/ML
4 INJECTION INTRAMUSCULAR; INTRAVENOUS EVERY 6 HOURS PRN
Status: DISCONTINUED | OUTPATIENT
Start: 2021-04-30 | End: 2021-05-01 | Stop reason: HOSPADM

## 2021-04-30 RX ORDER — AMLODIPINE BESYLATE 10 MG/1
10 TABLET ORAL DAILY
Status: DISCONTINUED | OUTPATIENT
Start: 2021-05-01 | End: 2021-05-01 | Stop reason: HOSPADM

## 2021-04-30 RX ORDER — ACETAMINOPHEN 325 MG/1
650 TABLET ORAL EVERY 6 HOURS PRN
Status: DISCONTINUED | OUTPATIENT
Start: 2021-04-30 | End: 2021-05-01 | Stop reason: HOSPADM

## 2021-04-30 RX ORDER — ATORVASTATIN CALCIUM 20 MG/1
20 TABLET, FILM COATED ORAL DAILY
Status: DISCONTINUED | OUTPATIENT
Start: 2021-05-01 | End: 2021-05-01 | Stop reason: HOSPADM

## 2021-04-30 RX ORDER — METFORMIN HYDROCHLORIDE 500 MG/1
500 TABLET, EXTENDED RELEASE ORAL
Status: DISCONTINUED | OUTPATIENT
Start: 2021-05-01 | End: 2021-05-01

## 2021-04-30 NOTE — ED PROVIDER NOTES
History  Chief Complaint   Patient presents with    Chest Pain     pt with a hx of angina presents with similar symptoms as previous chest pain events, chest tightness starting today after work aroud 3 pm       81-year-old male known history of adult onset diabetes hypertension presents secondary to having chest pressure associated with near syncopal sensation today  Patient was at work when he started feeling lightheaded had a pressure sensation in his chest   Patient states that he tried to drive himself here because he recently had evaluation in the emergency department chest pain were called EMS from work and the workup was negative so he felt embarrassed to call for EMS again  Patient apparently in the past month has had an echocardiogram which is demonstrated no acute findings a regular stress test demonstrated no acute findings patient denies having any intermittent recent chest pain or pressure but states today he started having sensation of lightheadedness associated with a pressure sensation in his chest   He is awake alert on my evaluation stating that he feels somewhat better at this point  Prior to Admission Medications   Prescriptions Last Dose Informant Patient Reported? Taking?    Blood Glucose Monitoring Suppl (ONE TOUCH ULTRA 2) w/Device KIT  Self No No   Sig: by Does not apply route 3 (three) times a day   Blood Pressure Monitoring (Blood Pressure Monitor/M Cuff) MISC  Self No No   Sig: Use 2 (two) times a day   Lancets (onetouch ultrasoft) lancets  Self No No   Sig: Use as instructed   amLODIPine (NORVASC) 10 mg tablet   No No   Sig: Take 1 tablet (10 mg total) by mouth daily   aspirin (ECOTRIN LOW STRENGTH) 81 mg EC tablet  Self No No   Sig: Take 1 tablet (81 mg total) by mouth daily   atorvastatin (LIPITOR) 20 mg tablet  Self No No   Sig: Take 1 tablet (20 mg total) by mouth daily   glucose blood (OneTouch Ultra) test strip  Self No No   Sig: Use as instructed   magnesium gluconate (MAGONATE) 500 mg tablet  Self No No   Sig: Take 1 tablet (500 mg total) by mouth daily   metFORMIN (GLUCOPHAGE-XR) 500 mg 24 hr tablet  Self No No   Sig: Take 1 tablet (500 mg total) by mouth daily with lunch   sertraline (ZOLOFT) 50 mg tablet   No No   Sig: Take 1 tablet (50 mg total) by mouth daily   varenicline (CHANTIX GIOVANI) 0 5 MG X 11 & 1 MG X 42 tablet   No No   Sig: Take one 0 5 mg tablet by mouth once daily for 3 days, then one 0 5 mg tablet by mouth twice daily for 4 days, then one 1 mg tablet by mouth twice daily  varenicline (CHANTIX) 1 mg tablet   No No   Sig: Take 1 tablet (1 mg total) by mouth 2 (two) times a day Start after completion of chantix pax ( varenicline)  Facility-Administered Medications: None       Past Medical History:   Diagnosis Date    Chest pain 1/12/2021    Diabetes mellitus ([facility] [address] )     Exposure to SARS-associated coronavirus 10/5/2020    Hypertension     Viral upper respiratory tract infection 10/7/2020       History reviewed  No pertinent surgical history  Family History   Problem Relation Age of Onset    Diabetes Mother     Diabetes Father      I have reviewed and agree with the history as documented  E-Cigarette/Vaping    E-Cigarette Use Never User      E-Cigarette/Vaping Substances     Social History     Tobacco Use    Smoking status: Current Every Day Smoker     Packs/day: 1 00     Years: 25 00     Pack years: 25 00     Types: Cigarettes     Start date: 2/18/1995    Smokeless tobacco: Never Used   Substance Use Topics    Alcohol use: Yes     Frequency: 2-3 times a week     Drinks per session: 1 or 2     Binge frequency: Never    Drug use: Never       Review of Systems   Cardiovascular: Positive for chest pain and palpitations  Neurological: Positive for weakness  Physical Exam  Physical Exam  Vitals signs and nursing note reviewed  Constitutional:       Appearance: He is well-developed  HENT:      Head: Normocephalic and atraumatic     Eyes: Conjunctiva/sclera: Conjunctivae normal       Pupils: Pupils are equal, round, and reactive to light  Neck:      Musculoskeletal: Normal range of motion and neck supple  Cardiovascular:      Rate and Rhythm: Normal rate and regular rhythm  Heart sounds: Normal heart sounds  Pulmonary:      Effort: Pulmonary effort is normal       Breath sounds: Normal breath sounds  Abdominal:      General: Bowel sounds are normal       Palpations: Abdomen is soft  Musculoskeletal: Normal range of motion  Skin:     General: Skin is warm and dry  Capillary Refill: Capillary refill takes less than 2 seconds  Neurological:      Mental Status: He is alert and oriented to person, place, and time     Psychiatric:         Behavior: Behavior normal          Vital Signs  ED Triage Vitals [04/30/21 1557]   Temp Pulse Respirations Blood Pressure SpO2   -- 74 16 146/88 99 %      Temp src Heart Rate Source Patient Position - Orthostatic VS BP Location FiO2 (%)   -- -- Lying Left arm --      Pain Score       4           Vitals:    04/30/21 1557   BP: 146/88   Pulse: 74   Patient Position - Orthostatic VS: Lying         Visual Acuity      ED Medications  Medications - No data to display    Diagnostic Studies  Results Reviewed     Procedure Component Value Units Date/Time    Troponin I [[id number]]  (Normal) Collected: 04/30/21 1616    Lab Status: Final result Specimen: Blood from Arm, Right Updated: 04/30/21 1640     Troponin I <0 03 ng/mL     Comprehensive metabolic panel [[id number]] Collected: 04/30/21 1616    Lab Status: Final result Specimen: Blood from Arm, Right Updated: 04/30/21 1638     Sodium 137 mmol/L      Potassium 3 8 mmol/L      Chloride 101 mmol/L      CO2 29 mmol/L      ANION GAP 7 mmol/L      BUN 17 mg/dL      Creatinine 1 08 mg/dL      Glucose 110 mg/dL      Calcium 9 5 mg/dL      AST 20 U/L      ALT 33 U/L      Alkaline Phosphatase 77 5 U/L      Total Protein 7 4 g/dL      Albumin 4 5 g/dL      Total Bilirubin 0 38 mg/dL      eGFR 96 ml/min/1 73sq m     Narrative:      National Kidney Disease Foundation guidelines for Chronic Kidney Disease (CKD):     Stage 1 with normal or high GFR (GFR > 90 mL/min/1 73 square meters)    Stage 2 Mild CKD (GFR = 60-89 mL/min/1 73 square meters)    Stage 3A Moderate CKD (GFR = 45-59 mL/min/1 73 square meters)    Stage 3B Moderate CKD (GFR = 30-44 mL/min/1 73 square meters)    Stage 4 Severe CKD (GFR = 15-29 mL/min/1 73 square meters)    Stage 5 End Stage CKD (GFR <15 mL/min/1 73 square meters)  Note: GFR calculation is accurate only with a steady state creatinine    Lipase [[id number]]  (Normal) Collected: 04/30/21 1616    Lab Status: Final result Specimen: Blood from Arm, Right Updated: 04/30/21 1638     Lipase 49 u/L     CBC and differential [[id number]]  (Abnormal) Collected: 04/30/21 1616    Lab Status: Final result Specimen: Blood from Arm, Right Updated: 04/30/21 1621     WBC 7 65 Thousand/uL      RBC 4 58 Million/uL      Hemoglobin 12 3 g/dL      Hematocrit 37 8 %      MCV 83 fL      MCH 26 9 pg      MCHC 32 5 g/dL      RDW 15 4 %      MPV 9 7 fL      Platelets 658 Thousands/uL      Neutrophils Relative 52 %      Immat GRANS % 0 %      Lymphocytes Relative 33 %      Monocytes Relative 12 %      Eosinophils Relative 2 %      Basophils Relative 1 %      Neutrophils Absolute 4 04 Thousands/µL      Immature Grans Absolute 0 03 Thousand/uL      Lymphocytes Absolute 2 52 Thousands/µL      Monocytes Absolute 0 90 Thousand/µL      Eosinophils Absolute 0 12 Thousand/µL      Basophils Absolute 0 04 Thousands/µL                  No orders to display              Procedures  Procedures         ED Course                             SBIRT 22yo+      Most Recent Value   SBIRT (24 yo +)   In order to provide better care to our patients, we are screening all of our patients for alcohol and drug use  Would it be okay to ask you these screening questions?   Yes Filed at: 04/30/2021 4771 Initial Alcohol Screen: US AUDIT-C    1  How often do you have a drink containing alcohol?  0 Filed at: 04/30/2021 1618   2  How many drinks containing alcohol do you have on a typical day you are drinking? 0 Filed at: 04/30/2021 1618   3a  Male UNDER 65: How often do you have five or more drinks on one occasion? 0 Filed at: 04/30/2021 1618   3b  FEMALE Any Age, or MALE 65+: How often do you have 4 or more drinks on one occassion? 0 Filed at: 04/30/2021 1618   Audit-C Score  0 Filed at: 04/30/2021 1618   ZANA: How many times in the past year have you    Used an illegal drug or used a prescription medication for non-medical reasons? Never Filed at: 04/30/2021 1618                    MDM  Number of Diagnoses or Management Options  Diagnosis management comments: EKG demonstrates normal sinus rhythm no acute ST T wave abnormalities patient was on the cardiac monitor remained in normal sinus rhythm no significant arrhythmias no significant bradycardias or blocks patient's lab work was all within normal limits CBC chemistry troponin  A consult with Cardiology on-call they felt that it given the fact that his recent diagnostic testing has been normal as well his diagnostic testing here and normal is reassuring however the concern is the near syncope associated with the chest pressure in a man with significant risk factors  So plan will be to admit the patient for cardiac monitoring cardiology consultation  Disposition  Final diagnoses:   None     ED Disposition     None      Follow-up Information    None         Patient's Medications   Discharge Prescriptions    No medications on file     No discharge procedures on file      PDMP Review     None          ED Provider  Electronically Signed by           Jose Seay MD  04/30/21 9786

## 2021-05-01 VITALS
DIASTOLIC BLOOD PRESSURE: 66 MMHG | OXYGEN SATURATION: 97 % | BODY MASS INDEX: 26.74 KG/M2 | WEIGHT: 191 LBS | HEART RATE: 66 BPM | SYSTOLIC BLOOD PRESSURE: 122 MMHG | TEMPERATURE: 98.1 F | RESPIRATION RATE: 18 BRPM | HEIGHT: 71 IN

## 2021-05-01 LAB
AMPHETAMINES SERPL QL SCN: NEGATIVE
ANION GAP SERPL CALCULATED.3IONS-SCNC: 4 MMOL/L (ref 4–13)
ATRIAL RATE: 73 BPM
BARBITURATES UR QL: NEGATIVE
BASOPHILS # BLD AUTO: 0.05 THOUSANDS/ΜL (ref 0–0.1)
BASOPHILS NFR BLD AUTO: 1 % (ref 0–1)
BENZODIAZ UR QL: NEGATIVE
BUN SERPL-MCNC: 14 MG/DL (ref 6–20)
CALCIUM SERPL-MCNC: 9.3 MG/DL (ref 8.4–10.2)
CHLORIDE SERPL-SCNC: 104 MMOL/L (ref 96–108)
CK SERPL-CCNC: 91.9 U/L (ref 49–397)
CO2 SERPL-SCNC: 30 MMOL/L (ref 22–33)
COCAINE UR QL: NEGATIVE
CREAT SERPL-MCNC: 0.91 MG/DL (ref 0.5–1.2)
EOSINOPHIL # BLD AUTO: 0.17 THOUSAND/ΜL (ref 0–0.61)
EOSINOPHIL NFR BLD AUTO: 3 % (ref 0–6)
ERYTHROCYTE [DISTWIDTH] IN BLOOD BY AUTOMATED COUNT: 15.8 % (ref 11.6–15.1)
GFR SERPL CREATININE-BSD FRML MDRD: 118 ML/MIN/1.73SQ M
GLUCOSE P FAST SERPL-MCNC: 108 MG/DL (ref 70–105)
GLUCOSE SERPL-MCNC: 104 MG/DL (ref 65–140)
GLUCOSE SERPL-MCNC: 108 MG/DL (ref 65–140)
GLUCOSE SERPL-MCNC: 137 MG/DL (ref 65–140)
GLUCOSE SERPL-MCNC: 150 MG/DL (ref 65–140)
HCT VFR BLD AUTO: 40.1 % (ref 36.5–49.3)
HGB BLD-MCNC: 13.1 G/DL (ref 12–17)
IMM GRANULOCYTES # BLD AUTO: 0.03 THOUSAND/UL (ref 0–0.2)
IMM GRANULOCYTES NFR BLD AUTO: 0 % (ref 0–2)
LYMPHOCYTES # BLD AUTO: 1.93 THOUSANDS/ΜL (ref 0.6–4.47)
LYMPHOCYTES NFR BLD AUTO: 29 % (ref 14–44)
MCH RBC QN AUTO: 26.8 PG (ref 26.8–34.3)
MCHC RBC AUTO-ENTMCNC: 32.7 G/DL (ref 31.4–37.4)
MCV RBC AUTO: 82 FL (ref 82–98)
METHADONE UR QL: NEGATIVE
MONOCYTES # BLD AUTO: 0.72 THOUSAND/ΜL (ref 0.17–1.22)
MONOCYTES NFR BLD AUTO: 11 % (ref 4–12)
NEUTROPHILS # BLD AUTO: 3.79 THOUSANDS/ΜL (ref 1.85–7.62)
NEUTS SEG NFR BLD AUTO: 56 % (ref 43–75)
OPIATES UR QL SCN: NEGATIVE
OXYCODONE+OXYMORPHONE UR QL SCN: NEGATIVE
P AXIS: 10 DEGREES
PCP UR QL: NEGATIVE
PLATELET # BLD AUTO: 269 THOUSANDS/UL (ref 149–390)
PMV BLD AUTO: 10.1 FL (ref 8.9–12.7)
POTASSIUM SERPL-SCNC: 4 MMOL/L (ref 3.5–5)
PR INTERVAL: 144 MS
QRS AXIS: 40 DEGREES
QRSD INTERVAL: 94 MS
QT INTERVAL: 398 MS
QTC INTERVAL: 439 MS
RBC # BLD AUTO: 4.89 MILLION/UL (ref 3.88–5.62)
SODIUM SERPL-SCNC: 138 MMOL/L (ref 133–145)
T WAVE AXIS: 40 DEGREES
THC UR QL: NEGATIVE
TROPONIN I SERPL-MCNC: <0.03 NG/ML (ref 0–0.07)
TROPONIN I SERPL-MCNC: <0.03 NG/ML (ref 0–0.07)
VENTRICULAR RATE: 73 BPM
WBC # BLD AUTO: 6.69 THOUSAND/UL (ref 4.31–10.16)

## 2021-05-01 PROCEDURE — 99217 PR OBSERVATION CARE DISCHARGE MANAGEMENT: CPT | Performed by: INTERNAL MEDICINE

## 2021-05-01 PROCEDURE — 82948 REAGENT STRIP/BLOOD GLUCOSE: CPT

## 2021-05-01 PROCEDURE — 80048 BASIC METABOLIC PNL TOTAL CA: CPT | Performed by: INTERNAL MEDICINE

## 2021-05-01 PROCEDURE — 85025 COMPLETE CBC W/AUTO DIFF WBC: CPT | Performed by: INTERNAL MEDICINE

## 2021-05-01 PROCEDURE — 80307 DRUG TEST PRSMV CHEM ANLYZR: CPT | Performed by: INTERNAL MEDICINE

## 2021-05-01 PROCEDURE — 82550 ASSAY OF CK (CPK): CPT | Performed by: INTERNAL MEDICINE

## 2021-05-01 PROCEDURE — 84484 ASSAY OF TROPONIN QUANT: CPT | Performed by: INTERNAL MEDICINE

## 2021-05-01 PROCEDURE — C9113 INJ PANTOPRAZOLE SODIUM, VIA: HCPCS | Performed by: INTERNAL MEDICINE

## 2021-05-01 PROCEDURE — 93010 ELECTROCARDIOGRAM REPORT: CPT | Performed by: INTERNAL MEDICINE

## 2021-05-01 RX ORDER — BUPROPION HYDROCHLORIDE 150 MG/1
150 TABLET, EXTENDED RELEASE ORAL DAILY
Qty: 30 TABLET | Refills: 0 | Status: SHIPPED | OUTPATIENT
Start: 2021-05-01 | End: 2021-09-30

## 2021-05-01 RX ORDER — PANTOPRAZOLE SODIUM 40 MG/1
40 INJECTION, POWDER, FOR SOLUTION INTRAVENOUS ONCE
Status: COMPLETED | OUTPATIENT
Start: 2021-05-01 | End: 2021-05-01

## 2021-05-01 RX ORDER — PANTOPRAZOLE SODIUM 40 MG/1
40 TABLET, DELAYED RELEASE ORAL
Qty: 60 TABLET | Refills: 0 | Status: SHIPPED | OUTPATIENT
Start: 2021-05-01 | End: 2021-06-21 | Stop reason: SDUPTHER

## 2021-05-01 RX ORDER — PANTOPRAZOLE SODIUM 40 MG/1
40 TABLET, DELAYED RELEASE ORAL
Status: DISCONTINUED | OUTPATIENT
Start: 2021-05-01 | End: 2021-05-01 | Stop reason: HOSPADM

## 2021-05-01 RX ORDER — NICOTINE 21 MG/24HR
14 PATCH, TRANSDERMAL 24 HOURS TRANSDERMAL DAILY
Status: DISCONTINUED | OUTPATIENT
Start: 2021-05-01 | End: 2021-05-01 | Stop reason: HOSPADM

## 2021-05-01 RX ADMIN — PANTOPRAZOLE SODIUM 40 MG: 40 INJECTION, POWDER, FOR SOLUTION INTRAVENOUS at 14:35

## 2021-05-01 RX ADMIN — AMLODIPINE BESYLATE 10 MG: 10 TABLET ORAL at 09:05

## 2021-05-01 RX ADMIN — Medication 500 MG: at 09:05

## 2021-05-01 RX ADMIN — NICOTINE 14 MG: 14 PATCH, EXTENDED RELEASE TRANSDERMAL at 09:06

## 2021-05-01 RX ADMIN — ASPIRIN 81 MG: 81 TABLET, COATED ORAL at 09:05

## 2021-05-01 RX ADMIN — ATORVASTATIN CALCIUM 20 MG: 20 TABLET, FILM COATED ORAL at 09:05

## 2021-05-01 NOTE — UTILIZATION REVIEW
Initial Clinical Review    Admission: Date/Time/Statement:   Admission Orders (From admission, onward)     Ordered        04/30/21 1750  Place in Observation  Once                   Orders Placed This Encounter   Procedures    Place in Observation     Standing Status:   Standing     Number of Occurrences:   1     Order Specific Question:   Level of Care     Answer:   Med Surg [16]     ED Arrival Information     Expected Arrival Acuity Means of Arrival Escorted By Service Admission Type    - 4/30/2021 15:50 Urgent Walk-In Self Hospitalist Urgent    Arrival Complaint    CHEST PAIN        Chief Complaint   Patient presents with    Chest Pain     pt with a hx of angina presents with similar symptoms as previous chest pain events, chest tightness starting today after work aroud 3 pm         Initial Presentation: 41 y o  male who presents with history of hypertension diabetes and tobacco use, patient has episodic exertional nonexertional chest pain for the past 6 months and has been evaluated by Cardiology with the recent exercise stress test which was negative for ischemia and a normal echocardiogram   Patient was at work as a  when he experienced chest pressure which is centrally located without any radiation and associated shortness of breath and some lightheadedness  Patient was able to drive to his home to take rest but with worsening chest pressure he decided to come to the hospital   Denies any fever chills cough leg swelling or changes in the nature of his chest pain    Hypertension  Assessment & Plan  Blood pressure not on goal, aim for blood pressure of 130/80  Continue home antihypertensives      Continuous dependence on cigarette smoking  Assessment & Plan  Continues to smoke half pack of cigarettes daily, has reduced from 1 pack  Wants to try Chantix 1 out of the hospital  Advised tobacco cessation, nicotine patch made available      * Chest pain  Assessment & Plan  Patient admitted for nonexertional chest pain/pressure associated with shortness of breath  Patient previously evaluated by Cardiology, recent stress test and echocardiogram have been unremarkable without any evidence of ischemia  Admission EKG has been without ST T wave changes  Cardiology consulted over the left by ER physician and was advised follow-up in outpatient office  Continue aspirin, atorvastatin and amlodipine         VTE Prophylaxis: Enoxaparin (Lovenox)  / sequential compression device   Code Status:  Full code  POLST: There is no POLST form on file for this patient (pre-hospital)  Discussion with family:  Patient will discuss with his spouse     Anticipated Length of Stay:  Patient will be admitted on an Observation basis with an anticipated length of stay of  less than 2 midnights     Justification for Hospital Stay:  Chest pain, with history of diabetes and hypertension for monitoring of troponins and telemetry  Date:    Day 2:     ED Triage Vitals   Temperature Pulse Respirations Blood Pressure SpO2   04/30/21 1855 04/30/21 1557 04/30/21 1557 04/30/21 1557 04/30/21 1557   98 2 °F (36 8 °C) 74 16 146/88 99 %      Temp Source Heart Rate Source Patient Position - Orthostatic VS BP Location FiO2 (%)   04/30/21 1855 04/30/21 1855 04/30/21 1557 04/30/21 1557 --   Tympanic Monitor Lying Left arm       Pain Score       04/30/21 1557       4          Wt Readings from Last 1 Encounters:   05/01/21 86 6 kg (191 lb)     Additional Vital Signs:   Pertinent Labs/Diagnostic Test Results:       Results from last 7 days   Lab Units 05/01/21  0524 04/30/21  1616   WBC Thousand/uL 6 69 7 65   HEMOGLOBIN g/dL 13 1 12 3   HEMATOCRIT % 40 1 37 8   PLATELETS Thousands/uL 269 259   NEUTROS ABS Thousands/µL 3 79 4 04         Results from last 7 days   Lab Units 05/01/21  0525 04/30/21  1616   SODIUM mmol/L 138 137   POTASSIUM mmol/L 4 0 3 8   CHLORIDE mmol/L 104 101   CO2 mmol/L 30 29   ANION GAP mmol/L 4 7   BUN mg/dL 14 17   CREATININE mg/dL 0 91 1 08   EGFR ml/min/1 73sq m 118 96   CALCIUM mg/dL 9 3 9 5     Results from last 7 days   Lab Units 04/30/21  1616   AST U/L 20   ALT U/L 33   ALK PHOS U/L 77 5   TOTAL PROTEIN g/dL 7 4   ALBUMIN g/dL 4 5   TOTAL BILIRUBIN mg/dL 0 38         Results from last 7 days   Lab Units 05/01/21  0525 04/30/21  1616   GLUCOSE RANDOM mg/dL 108 110             No results found for: BETA-HYDROXYBUTYRATE                   Results from last 7 days   Lab Units 05/01/21  0524 04/30/21  2326 04/30/21  1616   TROPONIN I ng/mL <0 03 <0 03 <0 03     Results from last 7 days   Lab Units 04/30/21  1818   D-DIMER QUANTITATIVE mg/L FEU <0 19*                                         Results from last 7 days   Lab Units 04/30/21  1616   LIPASE u/L 49                                                           ED Treatment:   Medication Administration from 04/30/2021 1550 to 04/30/2021 1845     None        Past Medical History:   Diagnosis Date    Chest pain 1/12/2021    Diabetes mellitus ([facility] [address] )     Exposure to SARS-associated coronavirus 10/5/2020    Hypertension     Viral upper respiratory tract infection 10/7/2020     Present on Admission:   Continuous dependence on cigarette smoking   Hypertension      Admitting Diagnosis: Syncope, near [R55]  Chest pain, unspecified [R07 9]  Chest pain, unspecified type [R07 9]  Age/Sex: 41 y o  male  Admission Orders:  Scheduled Medications:  amLODIPine, 10 mg, Oral, Daily  aspirin, 81 mg, Oral, Daily  atorvastatin, 20 mg, Oral, Daily  enoxaparin, 40 mg, Subcutaneous, Daily  insulin lispro, 1-6 Units, Subcutaneous, TID AC  magnesium gluconate, 500 mg, Oral, Daily  metFORMIN, 500 mg, Oral, Daily With Lunch  nicotine, 14 mg, Transdermal, Daily  sertraline, 50 mg, Oral, Daily      Continuous IV Infusions:     PRN Meds:  acetaminophen, 650 mg, Oral, Q6H PRN  ondansetron, 4 mg, Intravenous, Q6H PRN        None    Network Utilization Review Department  ATTENTION: Please call with any questions or concerns to [phone] and carefully listen to the prompts so that you are directed to the right person  All voicemails are confidential   [facility] all requests for admission clinical reviews, approved or denied determinations and any other requests to dedicated fax number below belonging to the campus where the patient is receiving treatment   List of dedicated fax numbers for the Facilities:  [address] DENIALS (Administrative/Medical Necessity) [phone]   [address] (Maternity/NICU/Pediatrics) [phone]   [address] [id number] [place] [address] [address] [place] [address] [phone]

## 2021-05-01 NOTE — ASSESSMENT & PLAN NOTE
Continues to smoke half pack of cigarettes daily, has reduced from 1 pack  Wants to try Chantix 1 out of the hospital  Advised tobacco cessation, nicotine patch made available

## 2021-05-01 NOTE — DISCHARGE INSTRUCTIONS
Chest Pain   WHAT YOU NEED TO KNOW:   Chest pain can be caused by a range of conditions, from not serious to life-threatening  Chest pain can be a symptom of a digestive problem, such as acid reflux or a stomach ulcer  An anxiety attack or a strong emotion, such as anger, can also cause chest pain  Infection, inflammation, or a fracture in the bones or cartilage in your chest can cause pain or discomfort  Sometimes chest pain or pressure is caused by poor blood flow to your heart (angina)  Chest pain may also be caused by life-threatening conditions such as a heart attack or blood clot in your lungs  DISCHARGE INSTRUCTIONS:   Call your local emergency number (911 in the [address] [facility]) or have someone call if:   · You have any of the following signs of a heart attack:      ? Squeezing, pressure, or pain in your chest    ? You may  also have any of the following:     § Discomfort or pain in your back, neck, jaw, stomach, or arm    § Shortness of breath    § Nausea or vomiting    § Lightheadedness or a sudden cold sweat      Return to the emergency department if:   · You have chest discomfort that gets worse, even with medicine  · You cough or vomit blood  · Your bowel movements are black or bloody  · You cannot stop vomiting, or it hurts to swallow  Call your doctor if:   · You have questions or concerns about your condition or care  Medicines:   · Medicines  may be given to treat the cause of your chest pain  Examples include pain medicine, anxiety medicine, or medicines to increase blood flow to your heart  · Do not take certain medicines without asking your healthcare provider first   These include NSAIDs, herbal or vitamin supplements, or hormones (estrogen or progestin)  · Take your medicine as directed  Contact your healthcare provider if you think your medicine is not helping or if you have side effects  Tell him or her if you are allergic to any medicine   Keep a list of the medicines, vitamins, and herbs you take  Include the amounts, and when and why you take them  Bring the list or the pill bottles to follow-up visits  Carry your medicine list with you in case of an emergency  Healthy living tips: The following are general healthy guidelines  If the cause of your chest pain is known, your healthcare provider will give you specific guidelines to follow  · Do not smoke  Nicotine and other chemicals in cigarettes and cigars can cause lung and heart damage  Ask your healthcare provider for information if you currently smoke and need help to quit  E-cigarettes or smokeless tobacco still contain nicotine  Talk to your healthcare provider before you use these products  · Choose a variety of healthy foods as often as possible  Include fresh, frozen, or canned fruits and vegetables  Also include low-fat dairy products, fish, chicken (without skin), and lean meats  Your healthcare provider or a dietitian can help you create meal plans  You may need to avoid certain foods or drinks if your pain is caused by a digestion problem  · Lower your sodium (salt) intake  Limit foods that are high in sodium, such as canned foods, salty snacks, and cold cuts  If you add salt when you cook food, do not add more at the table  Choose low-sodium canned foods as much as possible  · Drink plenty of water every day  Water helps your body to control your temperature and blood pressure  Ask your healthcare provider how much water you should drink every day  · Ask about activity  Your healthcare provider will tell you which activities to limit or avoid  Ask when you can drive, return to work, and have sex  Ask about the best exercise plan for you  · Maintain a healthy weight  Ask your healthcare provider what a healthy weight is for you  Ask him or her to help you create a safe weight loss plan if you are overweight  · Ask about vaccines you may need    Get the influenza (flu) vaccine every year as soon as recommended, usually in September or October  You may also need a pneumococcal vaccine to prevent pneumonia  The vaccine is usually given every 5 years, starting at age 72  Your healthcare provider can tell you if should get other vaccines, and when to get them  Follow up with your healthcare provider within 72 hours, or as directed: You may need to return for more tests to find the cause of your chest pain  You may be referred to a specialist, such as a cardiologist or gastroenterologist  Write down your questions so you remember to ask them during your visits  © Copyright [employer] 2021 Information is for End User's use only and may not be sold, redistributed or otherwise used for commercial purposes  All illustrations and images included in CareNotes® are the copyrighted property of A D A M , Inc  or Georges Martinez   The above information is an  only  It is not intended as medical advice for individual conditions or treatments  Talk to your doctor, nurse or pharmacist before following any medical regimen to see if it is safe and effective for you  Gastroesophageal Reflux Disease   WHAT YOU NEED TO KNOW:   Gastroesophageal reflux disease (GERD) is reflux that occurs more than twice a week for a few weeks  Reflux means acid and food in the stomach back up into the esophagus  It usually causes heartburn and other symptoms  GERD can cause other health problems over time if it is not treated  DISCHARGE INSTRUCTIONS:   Call your local emergency number (911 in the [address] [place] [address],[address]) if:   · You have severe chest pain and sudden trouble breathing  Seek care immediately if:   · You have trouble breathing after you vomit  · You have trouble swallowing, or pain with swallowing  · Your bowel movements are black, bloody, or tarry-looking  · Your vomit looks like coffee grounds or has blood in it      Call your doctor or gastroenterologist if:   · You feel full and cannot burp or vomit     · You vomit large amounts, or you vomit often  · You are losing weight without trying  · Your symptoms get worse or do not improve with treatment  · You have questions or concerns about your condition or care  Medicines:   · Medicines  are used to decrease stomach acid  Medicine may also be used to help your lower esophageal sphincter and stomach contract (tighten) more  · Take your medicine as directed  Contact your healthcare provider if you think your medicine is not helping or if you have side effects  Tell him or her if you are allergic to any medicine  Keep a list of the medicines, vitamins, and herbs you take  Include the amounts, and when and why you take them  Bring the list or the pill bottles to follow-up visits  Carry your medicine list with you in case of an emergency  Manage GERD:   · Do not have foods or drinks that may increase heartburn  These include chocolate, peppermint, fried or fatty foods, drinks that contain caffeine, or carbonated drinks (soda)  Other foods include spicy foods, onions, tomatoes, and tomato-based foods  Do not have foods or drinks that can irritate your esophagus, such as citrus fruits, juices, and alcohol  · Do not eat large meals  When you eat a lot of food at one time, your stomach needs more acid to digest it  Eat 6 small meals each day instead of 3 large ones, and eat slowly  Do not eat meals 2 to 3 hours before bedtime  · Elevate the head of your bed  Place 6-inch blocks under the head of your bed frame  You may also use more than one pillow under your head and shoulders while you sleep  · Maintain a healthy weight  If you are overweight, weight loss may help relieve symptoms of GERD  · Do not smoke  Smoking weakens the lower esophageal sphincter and increases the risk of GERD  Ask your healthcare provider for information if you currently smoke and need help to quit  E-cigarettes or smokeless tobacco still contain nicotine  Talk to your healthcare provider before you use these products  · Do not wear clothing that is tight around your waist   Tight clothing can put pressure on your stomach and cause or worsen GERD symptoms  Follow up with your doctor or gastroenterologist as directed:  Write down your questions so you remember to ask them during your visits  © Copyright [address] Information is for End User's use only and may not be sold, redistributed or otherwise used for commercial purposes  All illustrations and images included in CareNotes® are the copyrighted property of A D A [employer]  or [address] [place], [place] [address]jesus   The above information is an  only  It is not intended as medical advice for individual conditions or treatments  Talk to your doctor, nurse or pharmacist before following any medical regimen to see if it is safe and effective for you

## 2021-05-01 NOTE — ASSESSMENT & PLAN NOTE
Lab Results   Component Value Date    HGBA1C 5 7 (H) 01/11/2021     A1c at goal  -will continue home metformin along with sliding scale and Accu-Cheks  Hold metformin if plan for any procedure in the a m  No results for input(s): POCGLU in the last 72 hours      Blood Sugar Average: Last 72 hrs:

## 2021-05-01 NOTE — H&P
Aury U  67   H&P- Cristobal Garcia 1975, 41 y o  male MRN: [id number]  Unit/Bed#: -01 Encounter: [id number]  Primary Care Provider: Olamide Vernon MD   Date and time admitted to hospital: 4/30/2021  3:52 PM    Hypertension  Assessment & Plan  Blood pressure not on goal, aim for blood pressure of 130/80  Continue home antihypertensives  Continuous dependence on cigarette smoking  Assessment & Plan  Continues to smoke half pack of cigarettes daily, has reduced from 1 pack  Wants to try Chantix 1 out of the hospital  Advised tobacco cessation, nicotine patch made available  * Chest pain  Assessment & Plan  Patient admitted for nonexertional chest pain/pressure associated with shortness of breath  Patient previously evaluated by Cardiology, recent stress test and echocardiogram have been unremarkable without any evidence of ischemia  Admission EKG has been without ST T wave changes  Cardiology consulted over the left by ER physician and was advised follow-up in outpatient office  Continue aspirin, atorvastatin and amlodipine  VTE Prophylaxis: Enoxaparin (Lovenox)  / sequential compression device   Code Status:  Full code  POLST: There is no POLST form on file for this patient (pre-hospital)  Discussion with family:  Patient will discuss with his spouse    Anticipated Length of Stay:  Patient will be admitted on an Observation basis with an anticipated length of stay of  less than 2 midnights  Justification for Hospital Stay:  Chest pain, with history of diabetes and hypertension for monitoring of troponins and telemetry    Total Time for Visit, including Counseling / Coordination of Care: 45 minutes  Greater than 50% of this total time spent on direct patient counseling and coordination of care      Chief Complaint:   Chest pain    History of Present Illness:    Cristobal Garcia is a 41 y o  male who presents with history of hypertension diabetes and tobacco use, patient has episodic exertional nonexertional chest pain for the past 6 months and has been evaluated by Cardiology with the recent exercise stress test which was negative for ischemia and a normal echocardiogram   Patient was at work as a  when he experienced chest pressure which is centrally located without any radiation and associated shortness of breath and some lightheadedness  Patient was able to drive to his home to take rest but with worsening chest pressure he decided to come to the hospital   Denies any fever chills cough leg swelling or changes in the nature of his chest pain  Blood pressures have not been at goal, admission EKG and chest x-ray was unremarkable,   Initial troponins negative    Review of Systems:    Review of Systems   Constitutional: Negative for activity change, appetite change, chills, diaphoresis, fatigue, fever and unexpected weight change  HENT: Negative for congestion and sore throat  Respiratory: Positive for chest tightness and shortness of breath  Negative for apnea, cough, choking, wheezing and stridor  Cardiovascular: Positive for chest pain  Negative for palpitations and leg swelling  Gastrointestinal: Negative for abdominal distention, abdominal pain, blood in stool, constipation, nausea and rectal pain  Genitourinary: Negative for dysuria, flank pain, frequency and urgency  Musculoskeletal: Negative for arthralgias, back pain, gait problem, joint swelling and myalgias  Skin: Negative for color change, pallor and rash  Neurological: Positive for light-headedness  Negative for dizziness, seizures, syncope and headaches  Psychiatric/Behavioral: Negative for agitation, behavioral problems, confusion, dysphoric mood, sleep disturbance and suicidal ideas         Past Medical and Surgical History:     Past Medical History:   Diagnosis Date    Chest pain 1/12/2021    Diabetes mellitus ([facility] [address] )     Exposure to SARS-associated coronavirus 10/5/2020    Hypertension     Viral upper respiratory tract infection 10/7/2020       History reviewed  No pertinent surgical history  Meds/Allergies:    Prior to Admission medications    Medication Sig Start Date End Date Taking? Authorizing Provider   amLODIPine (NORVASC) 10 mg tablet Take 1 tablet (10 mg total) by mouth daily 4/13/21 7/12/21  Juan David Claudio MD   aspirin (ECOTRIN LOW STRENGTH) 81 mg EC tablet Take 1 tablet (81 mg total) by mouth daily 3/9/21   Estelle Story MD   atorvastatin (LIPITOR) 20 mg tablet Take 1 tablet (20 mg total) by mouth daily 3/9/21   Estelle Story MD   Blood Glucose Monitoring Suppl (ONE TOUCH ULTRA 2) w/Device KIT by Does not apply route 3 (three) times a day 9/3/20   Indy Mills MD   Blood Pressure Monitoring (Blood Pressure Monitor/M Cuff) MISC Use 2 (two) times a day 3/10/21   Indy Mills MD   glucose blood (OneTouch Ultra) test strip Use as instructed 1/12/21   Donald Ceballos MD   Lancets (onetouch ultrasoft) lancets Use as instructed 1/12/21   Donald Ceballos MD   magnesium gluconate (MAGONATE) 500 mg tablet Take 1 tablet (500 mg total) by mouth daily 3/17/21   Indy Mills MD   metFORMIN (GLUCOPHAGE-XR) 500 mg 24 hr tablet Take 1 tablet (500 mg total) by mouth daily with lunch 3/9/21   Estelle Story MD   sertraline (ZOLOFT) 50 mg tablet Take 1 tablet (50 mg total) by mouth daily 4/19/21 7/18/21  Indy Mills MD   varenicline (CHANTIX GIOVANI) 0 5 MG X 11 & 1 MG X 42 tablet Take one 0 5 mg tablet by mouth once daily for 3 days, then one 0 5 mg tablet by mouth twice daily for 4 days, then one 1 mg tablet by mouth twice daily  4/13/21   Juan David Claudio MD   varenicline (CHANTIX) 1 mg tablet Take 1 tablet (1 mg total) by mouth 2 (two) times a day Start after completion of chantix pax ( varenicline)  4/13/21 5/13/21  Juan David Claudio MD     I have reviewed home medications with patient personally      Allergies: No Known Allergies    Social History:     Marital Status: /Civil Union   Occupation:    Patient Pre-hospital Living Situation:  Home  Patient Pre-hospital Level of Mobility:  No restrictions  Patient Pre-hospital Diet Restrictions:  Cardiovascular diet  Substance Use History:   Social History     Substance and Sexual Activity   Alcohol Use Yes    Frequency: 2-3 times a week    Drinks per session: 1 or 2    Binge frequency: Never     Social History     Tobacco Use   Smoking Status Current Every Day Smoker    Packs/day: 1 00    Years: 25 00    Pack years: 25 00    Types: Cigarettes    Start date: 1995   Smokeless Tobacco Never Used     Social History     Substance and Sexual Activity   Drug Use Never       Family History:    Father had history of hypertension and diabetes and  of myocardial infarction at age of 61    Physical Exam:     Vitals:   Blood Pressure: 131/74 (21)  Pulse: 71 (21)  Temperature: 97 9 °F (36 6 °C) (21)  Temp Source: Tympanic (21)  Respirations: 16 (21)  SpO2: 97 % (21)    Physical Exam    (  Be Sure to Include Physical Exam: Delete this entire line when you have entered your exam)    Additional Data:     Lab Results: I have personally reviewed pertinent reports  Results from last 7 days   Lab Units 21  1616   WBC Thousand/uL 7 65   HEMOGLOBIN g/dL 12 3   HEMATOCRIT % 37 8   PLATELETS Thousands/uL 259   NEUTROS PCT % 52   LYMPHS PCT % 33   MONOS PCT % 12   EOS PCT % 2     Results from last 7 days   Lab Units 21  1616   SODIUM mmol/L 137   POTASSIUM mmol/L 3 8   CHLORIDE mmol/L 101   CO2 mmol/L 29   BUN mg/dL 17   CREATININE mg/dL 1 08   ANION GAP mmol/L 7   CALCIUM mg/dL 9 5   ALBUMIN g/dL 4 5   TOTAL BILIRUBIN mg/dL 0 38   ALK PHOS U/L 77 5   ALT U/L 33   AST U/L 20   GLUCOSE RANDOM mg/dL 110                       Imaging: I have personally reviewed pertinent reports        No orders to display       EKG, Pathology, and Other Studies Reviewed on Admission:   · EKG:  Normal sinus rhythm no ST T-wave changes    Allscripts / Epic Records Reviewed: Yes     ** Please Note: This note has been constructed using a voice recognition system   **

## 2021-05-01 NOTE — PLAN OF CARE
Problem: PAIN - ADULT  Goal: Verbalizes/displays adequate comfort level or baseline comfort level  Description: Interventions:  - Encourage patient to monitor pain and request assistance  - Assess pain using appropriate pain scale  - Administer analgesics based on type and severity of pain and evaluate response  - Implement non-pharmacological measures as appropriate and evaluate response  - Consider cultural and social influences on pain and pain management  - Notify physician/advanced practitioner if interventions unsuccessful or patient reports new pain  Outcome: Progressing     Problem: INFECTION - ADULT  Goal: Absence or prevention of progression during hospitalization  Description: INTERVENTIONS:  - Assess and monitor for signs and symptoms of infection  - Monitor lab/diagnostic results  - Monitor all insertion sites, i e  indwelling lines, tubes, and drains  - Monitor endotracheal if appropriate and nasal secretions for changes in amount and color  - Vancleve appropriate cooling/warming therapies per order  - Administer medications as ordered  - Instruct and encourage patient and family to use good hand hygiene technique  - Identify and instruct in appropriate isolation precautions for identified infection/condition  Outcome: Progressing  Goal: Absence of fever/infection during neutropenic period  Description: INTERVENTIONS:  - Monitor WBC    Outcome: Progressing     Problem: SAFETY ADULT  Goal: Patient will remain free of falls  Description: INTERVENTIONS:  - Assess patient frequently for physical needs  -  Identify cognitive and physical deficits and behaviors that affect risk of falls    -  Vancleve fall precautions as indicated by assessment   - Educate patient/family on patient safety including physical limitations  - Instruct patient to call for assistance with activity based on assessment  - Modify environment to reduce risk of injury  - Consider OT/PT consult to assist with strengthening/mobility  Outcome: Progressing  Goal: Maintain or return to baseline ADL function  Description: INTERVENTIONS:  -  Assess patient's ability to carry out ADLs; assess patient's baseline for ADL function and identify physical deficits which impact ability to perform ADLs (bathing, care of mouth/teeth, toileting, grooming, dressing, etc )  - Assess/evaluate cause of self-care deficits   - Assess range of motion  - Assess patient's mobility; develop plan if impaired  - Assess patient's need for assistive devices and provide as appropriate  - Encourage maximum independence but intervene and supervise when necessary  - Involve family in performance of ADLs  - Assess for home care needs following discharge   - Consider OT consult to assist with ADL evaluation and planning for discharge  - Provide patient education as appropriate  Outcome: Progressing  Goal: Maintain or return mobility status to optimal level  Description: INTERVENTIONS:  - Assess patient's baseline mobility status (ambulation, transfers, stairs, etc )    - Identify cognitive and physical deficits and behaviors that affect mobility  - Identify mobility aids required to assist with transfers and/or ambulation (gait belt, sit-to-stand, lift, walker, cane, etc )  - [place] fall precautions as indicated by assessment  - Record patient progress and toleration of activity level on Mobility SBAR; progress patient to next Phase/Stage  - Instruct patient to call for assistance with activity based on assessment  - Consider rehabilitation consult to assist with strengthening/weightbearing, etc   Outcome: Progressing     Problem: DISCHARGE PLANNING  Goal: Discharge to home or other facility with appropriate resources  Description: INTERVENTIONS:  - Identify barriers to discharge w/patient and caregiver  - Arrange for needed discharge resources and transportation as appropriate  - Identify discharge learning needs (meds, wound care, etc )  - Arrange for interpretive services to assist at discharge as needed  - Refer to Case Management Department for coordinating discharge planning if the patient needs post-hospital services based on physician/advanced practitioner order or complex needs related to functional status, cognitive ability, or social support system  Outcome: Progressing     Problem: Knowledge Deficit  Goal: Patient/family/caregiver demonstrates understanding of disease process, treatment plan, medications, and discharge instructions  Description: Complete learning assessment and assess knowledge base    Interventions:  - Provide teaching at level of understanding  - Provide teaching via preferred learning methods  Outcome: Progressing

## 2021-05-01 NOTE — ASSESSMENT & PLAN NOTE
Lab Results   Component Value Date    HGBA1C 5 7 (H) 01/11/2021       Recent Labs     05/01/21  0813 05/01/21  1054 05/01/21  1603   POCGLU 104 137 150*       Blood Sugar Average: Last 72 hrs:  (P) 088 [id number]     Continue with home metformin 500 mg daily  Follow-up with PCP  Remain consistent with carbohydrate consistent diet

## 2021-05-01 NOTE — ASSESSMENT & PLAN NOTE
Patient admitted for nonexertional chest pain/pressure associated with shortness of breath  Patient previously evaluated by Cardiology, recent stress test and echocardiogram have been unremarkable without any evidence of ischemia  Admission EKG has been without ST T wave changes  Cardiology consulted over the left by ER physician and was advised follow-up in outpatient office with possible plan for Holter monitor  Continue aspirin, atorvastatin and amlodipine

## 2021-05-01 NOTE — DISCHARGE INSTR - AVS FIRST PAGE
Please follow-up with your PCP within 1 week concerning recent hospitalization    Please follow-up with GI for necessary scoping and GERD follow-up    Continue to take bupropion 150 mg once daily for 7 days, followed by 150 mg twice a day    Continue to follow-up with PCP

## 2021-05-01 NOTE — DISCHARGE SUMMARY
Aury U  67   Discharge- Ricardo Sam 1975, 41 y o  male MRN: [id number]  Unit/Bed#: -01 Encounter: [id number]  Primary Care Provider: Ken Chavarria MD   Date and time admitted to hospital: 4/30/2021  3:52 PM    Type 2 diabetes mellitus [facility])  Assessment & Plan  Lab Results   Component Value Date    HGBA1C 5 7 (H) 01/11/2021       Recent Labs     05/01/21  0813 05/01/21  1054 05/01/21  1603   POCGLU 104 137 150*       Blood Sugar Average: Last 72 hrs:  (P) 166 [id number]     Continue with home metformin 500 mg daily  Follow-up with PCP  Remain consistent with carbohydrate consistent diet    Hypertension  Assessment & Plan  Continue Norvasc 10 mg p o  Daily    Continuous dependence on cigarette smoking  Assessment & Plan  Patient will be discharged on bupropion 150 mg p o  Daily for 7 days, followed by 150 mg twice a day  Outpatient PCP follow-up is required for dosage adjustment    * Chest pain  Assessment & Plan  Presented with atypical chest pain, nonreproducible, non pleuritic, not associated with activity, burning in nature, centrally located  Cardiac workup done recently-unremarkable in terms of stress test and echocardiogram     Troponin x3-negative, EKG normal sinus rhythm no ST T wave changes    Patient is discharged on pantoprazole 40 mg p o  B i d    Outpatient GI follow-up is advised for possible scoping              Resolved Problems  Date Reviewed: 5/1/2021    None          Admission Date:   Admission Orders (From admission, onward)     Ordered        04/30/21 1750  Place in Observation  Once                     Admitting Diagnosis: Syncope, near [R55]  Chest pain, unspecified [R07 9]  Chest pain, unspecified type [R07 9]    HPI:  As per,Willi Suazo MD on 05/01/2021Steve Angelica Munoz is a 41 y o  male who presents with history of hypertension diabetes and tobacco use, patient has episodic exertional nonexertional chest pain for the past 6 months and has been evaluated by Cardiology with the recent exercise stress test which was negative for ischemia and a normal echocardiogram   Patient was at work as a  when he experienced chest pressure which is centrally located without any radiation and associated shortness of breath and some lightheadedness  Patient was able to drive to his home to take rest but with worsening chest pressure he decided to come to the hospital   Denies any fever chills cough leg swelling or changes in the nature of his chest pain      Blood pressures have not been at goal, admission EKG and chest x-ray was unremarkable,   Initial troponins negative       Procedures Performed: No orders of the defined types were placed in this encounter  Summary of Hospital Course: Throughout the hospital stay, patient remained hemodynamically stable patient was put on telemetry for observation, no rhythm abnormality or advance heart block were seen  Troponins were traced time 3-which were negative with no EKG changes     Shunt had a ETTA score of 2, had no episode of chest pain or presyncopal symptoms during hospital stay  He was able to ambulate in his room comfortably  Case was also discussed with on-call cardiologist, who deferred any further treatment  Patient is advised to have outpatient PCP, GI and Cardiology follow-up for further workup  Significant Findings, Care, Treatment and Services Provided:  None    Complications:  None    Condition at Discharge: stable         Discharge instructions/Information to patient and family:   See after visit summary for information provided to patient and family  Provisions for Follow-Up Care:  See after visit summary for information related to follow-up care and any pertinent home health orders  PCP: Merary Souza MD    Disposition: Home    Planned Readmission: No    Discharge Statement   I spent 35 minutes discharging the patient   This time was spent on the day of discharge  I had direct contact with the patient on the day of discharge  Additional documentation is required if more than 30 minutes were spent on discharge  Discharge Medications:  See after visit summary for reconciled discharge medications provided to patient and family

## 2021-05-01 NOTE — ASSESSMENT & PLAN NOTE
Patient will be discharged on bupropion 150 mg p o   Daily for 7 days, followed by 150 mg twice a day  Outpatient PCP follow-up is required for dosage adjustment

## 2021-05-01 NOTE — ASSESSMENT & PLAN NOTE
Presented with atypical chest pain, nonreproducible, non pleuritic, not associated with activity, burning in nature, centrally located  Cardiac workup done recently-unremarkable in terms of stress test and echocardiogram     Troponin x3-negative, EKG normal sinus rhythm no ST T wave changes    Patient is discharged on pantoprazole 40 mg p o  B i d    Outpatient GI follow-up is advised for possible scoping

## 2021-05-06 ENCOUNTER — TELEPHONE (OUTPATIENT)
Dept: INTERNAL MEDICINE CLINIC | Facility: CLINIC | Age: 46
End: 2021-05-06

## 2021-05-06 NOTE — TELEPHONE ENCOUNTER
Pt called and would like you to update his FMLA to state 2 times per month and 3 days per episode  I have printed the paperwork of 3/22 for update   Thank you

## 2021-05-06 NOTE — TELEPHONE ENCOUNTER
I have updated his FMLA last month, cannot see on Media files    not sure what happened to the last paper filled

## 2021-05-14 ENCOUNTER — TELEPHONE (OUTPATIENT)
Dept: NEUROLOGY | Facility: CLINIC | Age: 46
End: 2021-05-14

## 2021-06-10 ENCOUNTER — APPOINTMENT (EMERGENCY)
Dept: RADIOLOGY | Facility: HOSPITAL | Age: 46
End: 2021-06-10
Payer: COMMERCIAL

## 2021-06-10 ENCOUNTER — HOSPITAL ENCOUNTER (EMERGENCY)
Facility: HOSPITAL | Age: 46
Discharge: HOME | End: 2021-06-10
Attending: INTERNAL MEDICINE
Payer: COMMERCIAL

## 2021-06-10 VITALS
HEIGHT: 71 IN | HEART RATE: 58 BPM | BODY MASS INDEX: 26.54 KG/M2 | SYSTOLIC BLOOD PRESSURE: 135 MMHG | RESPIRATION RATE: 18 BRPM | WEIGHT: 189.6 LBS | TEMPERATURE: 99.5 F | DIASTOLIC BLOOD PRESSURE: 79 MMHG | OXYGEN SATURATION: 98 %

## 2021-06-10 DIAGNOSIS — R07.89 ATYPICAL CHEST PAIN: Primary | ICD-10-CM

## 2021-06-10 LAB
ALBUMIN SERPL BCP-MCNC: 4.4 G/DL (ref 3.4–4.8)
ALP SERPL-CCNC: 68.5 U/L (ref 10–129)
ALT SERPL W P-5'-P-CCNC: 33 U/L (ref 5–63)
ANION GAP SERPL CALCULATED.3IONS-SCNC: 9 MMOL/L (ref 4–13)
AST SERPL W P-5'-P-CCNC: 18 U/L (ref 15–41)
BASOPHILS # BLD AUTO: 0.04 THOUSANDS/ΜL (ref 0–0.1)
BASOPHILS NFR BLD AUTO: 1 % (ref 0–1)
BILIRUB SERPL-MCNC: 0.33 MG/DL (ref 0.3–1.2)
BUN SERPL-MCNC: 12 MG/DL (ref 6–20)
CALCIUM SERPL-MCNC: 9.3 MG/DL (ref 8.4–10.2)
CHLORIDE SERPL-SCNC: 104 MMOL/L (ref 96–108)
CO2 SERPL-SCNC: 26 MMOL/L (ref 22–33)
CREAT SERPL-MCNC: 0.94 MG/DL (ref 0.5–1.2)
EOSINOPHIL # BLD AUTO: 0.08 THOUSAND/ΜL (ref 0–0.61)
EOSINOPHIL NFR BLD AUTO: 1 % (ref 0–6)
ERYTHROCYTE [DISTWIDTH] IN BLOOD BY AUTOMATED COUNT: 14.9 % (ref 11.6–15.1)
GFR SERPL CREATININE-BSD FRML MDRD: 113 ML/MIN/1.73SQ M
GLUCOSE SERPL-MCNC: 122 MG/DL (ref 65–140)
HCT VFR BLD AUTO: 40.1 % (ref 36.5–49.3)
HGB BLD-MCNC: 13 G/DL (ref 12–17)
IMM GRANULOCYTES # BLD AUTO: 0.03 THOUSAND/UL (ref 0–0.2)
IMM GRANULOCYTES NFR BLD AUTO: 0 % (ref 0–2)
LYMPHOCYTES # BLD AUTO: 1.66 THOUSANDS/ΜL (ref 0.6–4.47)
LYMPHOCYTES NFR BLD AUTO: 24 % (ref 14–44)
MCH RBC QN AUTO: 26.7 PG (ref 26.8–34.3)
MCHC RBC AUTO-ENTMCNC: 32.4 G/DL (ref 31.4–37.4)
MCV RBC AUTO: 83 FL (ref 82–98)
MONOCYTES # BLD AUTO: 0.67 THOUSAND/ΜL (ref 0.17–1.22)
MONOCYTES NFR BLD AUTO: 10 % (ref 4–12)
NEUTROPHILS # BLD AUTO: 4.45 THOUSANDS/ΜL (ref 1.85–7.62)
NEUTS SEG NFR BLD AUTO: 64 % (ref 43–75)
PLATELET # BLD AUTO: 244 THOUSANDS/UL (ref 149–390)
PMV BLD AUTO: 10.6 FL (ref 8.9–12.7)
POTASSIUM SERPL-SCNC: 3.8 MMOL/L (ref 3.5–5)
PROT SERPL-MCNC: 7.5 G/DL (ref 6.4–8.3)
RBC # BLD AUTO: 4.86 MILLION/UL (ref 3.88–5.62)
SODIUM SERPL-SCNC: 139 MMOL/L (ref 133–145)
TROPONIN I SERPL-MCNC: <0.03 NG/ML (ref 0–0.07)
WBC # BLD AUTO: 6.93 THOUSAND/UL (ref 4.31–10.16)

## 2021-06-10 PROCEDURE — 71046 X-RAY EXAM CHEST 2 VIEWS: CPT

## 2021-06-10 PROCEDURE — 80053 COMPREHEN METABOLIC PANEL: CPT

## 2021-06-10 PROCEDURE — 99285 EMERGENCY DEPT VISIT HI MDM: CPT

## 2021-06-10 PROCEDURE — 85025 COMPLETE CBC W/AUTO DIFF WBC: CPT

## 2021-06-10 PROCEDURE — 84484 ASSAY OF TROPONIN QUANT: CPT

## 2021-06-10 PROCEDURE — 96374 THER/PROPH/DIAG INJ IV PUSH: CPT

## 2021-06-10 PROCEDURE — 99285 EMERGENCY DEPT VISIT HI MDM: CPT | Performed by: PHYSICIAN ASSISTANT

## 2021-06-10 PROCEDURE — 93005 ELECTROCARDIOGRAM TRACING: CPT

## 2021-06-10 PROCEDURE — 36415 COLL VENOUS BLD VENIPUNCTURE: CPT

## 2021-06-10 RX ORDER — KETOROLAC TROMETHAMINE 30 MG/ML
30 INJECTION, SOLUTION INTRAMUSCULAR; INTRAVENOUS ONCE
Status: COMPLETED | OUTPATIENT
Start: 2021-06-10 | End: 2021-06-10

## 2021-06-10 RX ADMIN — KETOROLAC TROMETHAMINE 30 MG: 30 INJECTION, SOLUTION INTRAMUSCULAR at 11:43

## 2021-06-10 NOTE — ED PROVIDER NOTES
History  Chief Complaint   Patient presents with    Chest Pain     L sided chest pain radiating to neck since this AM, neck is "stiff", tender to palpation     Pt with Past Medical History: Chest pain, Diabetes mellitus, Hypertension, Viral upper respiratory tract infection   No PSH  Presents to ED c/o diffuse L-sided chest pain radiating up whole left side chest into neck since this AM, some SOB, no fever, no abd pain, no NVD, no LE edema, some intermittent dizziness;  did not take anything for sx PTA  Sx began at work although pt denies any heavy lifting  Recently admitted for CP, dc 5/1/21 trop x 3 neg, no other abnormalities  Cardiology: 4/1/21, exercise stress test:  ECG conclusions: The stress ECG was negative for ischemia  There were no stress arrhythmias or conduction abnormalities  PCP: Johanne Peña MD               Prior to Admission Medications   Prescriptions Last Dose Informant Patient Reported? Taking?    Blood Glucose Monitoring Suppl (ONE TOUCH ULTRA 2) w/Device KIT Unknown at Unknown time Self No No   Sig: by Does not apply route 3 (three) times a day   Blood Pressure Monitoring (Blood Pressure Monitor/M Cuff) MISC Unknown at Unknown time Self No No   Sig: Use 2 (two) times a day   Lancets (onetouch ultrasoft) lancets Unknown at Unknown time Self No No   Sig: Use as instructed   amLODIPine (NORVASC) 10 mg tablet 6/9/2021 at Unknown time  No Yes   Sig: Take 1 tablet (10 mg total) by mouth daily   aspirin (ECOTRIN LOW STRENGTH) 81 mg EC tablet Past Week at Unknown time Self No Yes   Sig: Take 1 tablet (81 mg total) by mouth daily   atorvastatin (LIPITOR) 20 mg tablet 6/9/2021 at Unknown time Self No Yes   Sig: Take 1 tablet (20 mg total) by mouth daily   buPROPion (ZYBAN) 150 MG 12 hr tablet Not Taking at Unknown time  No No   Sig: Take 1 tablet (150 mg total) by mouth daily   Patient not taking: Reported on 6/10/2021   glucose blood (OneTouch Ultra) test strip Unknown at Unknown time Self No No   Sig: Use as instructed   magnesium gluconate (MAGONATE) 500 mg tablet Past Week at Unknown time Self No Yes   Sig: Take 1 tablet (500 mg total) by mouth daily   metFORMIN (GLUCOPHAGE-XR) 500 mg 24 hr tablet 6/9/2021 at Unknown time Self No Yes   Sig: Take 1 tablet (500 mg total) by mouth daily with lunch   pantoprazole (PROTONIX) 40 mg tablet 6/9/2021 at Unknown time  No Yes   Sig: Take 1 tablet (40 mg total) by mouth 2 (two) times a day before meals   sertraline (ZOLOFT) 50 mg tablet More than a month at Unknown time  No No   Sig: Take 1 tablet (50 mg total) by mouth daily      Facility-Administered Medications: None       Past Medical History:   Diagnosis Date    Chest pain 1/12/2021    [facility] mellitus ([facility] [address] )     Exposure to SARS-associated coronavirus 10/5/2020    Hypertension     Viral upper respiratory tract infection 10/7/2020       History reviewed  No pertinent surgical history  Family History   Problem Relation Age of Onset    Diabetes Mother     Diabetes Father      I have reviewed and agree with the history as documented  E-Cigarette/Vaping    E-Cigarette Use Never User      E-Cigarette/Vaping Substances     Social History     Tobacco Use    Smoking status: Current Every Day Smoker     Packs/day: 0 50     Years: 25 00     Pack years: 12 50     Types: Cigarettes     Start date: 2/18/1995    Smokeless tobacco: Never Used   Substance Use Topics    Alcohol use: Yes     Frequency: 2-3 times a week     Drinks per session: 1 or 2     Binge frequency: Never    Drug use: Never       Review of Systems   Constitutional: Negative for chills and fever  HENT: Negative for ear pain, hearing loss, mouth sores, sore throat and trouble swallowing  Eyes: Negative for visual disturbance  Respiratory: Negative for cough and shortness of breath  Cardiovascular: Positive for chest pain  Negative for leg swelling  Gastrointestinal: Negative for abdominal pain and vomiting     Genitourinary: Negative for dysuria and frequency  Musculoskeletal: Positive for myalgias and neck pain  Negative for arthralgias  Skin: Negative for color change and pallor  Neurological: Positive for dizziness  Negative for weakness, light-headedness and headaches  Hematological: Does not bruise/bleed easily  Psychiatric/Behavioral: Negative for behavioral problems  All other systems reviewed and are negative  Physical Exam  Physical Exam  Vitals signs and nursing note reviewed  Constitutional:       General: He is in acute distress (mild)  Appearance: He is well-developed  HENT:      Head: Normocephalic and atraumatic  Right Ear: External ear normal       Left Ear: External ear normal       Nose: Nose normal       Mouth/Throat:      Mouth: Mucous membranes are moist       Pharynx: Oropharynx is clear  Eyes:      Conjunctiva/sclera: Conjunctivae normal       Pupils: Pupils are equal, round, and reactive to light  Neck:      Musculoskeletal: Normal range of motion and neck supple  Vascular: No JVD  Cardiovascular:      Rate and Rhythm: Normal rate and regular rhythm  Heart sounds: Normal heart sounds  Pulmonary:      Effort: Pulmonary effort is normal  No respiratory distress  Breath sounds: Normal breath sounds  Chest:      Chest wall: No edema  Abdominal:      General: Bowel sounds are normal       Palpations: Abdomen is soft  Musculoskeletal: Normal range of motion  Right lower leg: No edema  Left lower leg: No edema  Skin:     General: Skin is warm and dry  Capillary Refill: Capillary refill takes less than 2 seconds  Findings: No rash  Neurological:      General: No focal deficit present  Mental Status: He is alert and oriented to person, place, and time     Psychiatric:         Behavior: Behavior normal          Vital Signs  ED Triage Vitals [06/10/21 1107]   Temperature Pulse Respirations Blood Pressure SpO2   99 5 °F (37 5 °C) 64 18 162/90 99 %      Temp Source Heart Rate Source Patient Position - Orthostatic VS BP Location FiO2 (%)   Oral Monitor Lying Right arm --      Pain Score       No Pain           Vitals:    06/10/21 1107 06/10/21 1241   BP: 162/90 135/79   Pulse: 64 58   Patient Position - Orthostatic VS: Lying Lying         Visual Acuity      ED Medications  Medications   ketorolac (TORADOL) injection 30 mg (30 mg Intravenous Given 6/10/21 1143)       Diagnostic Studies  Results Reviewed     Procedure Component Value Units Date/Time    Troponin I [[id number]]  (Normal) Collected: 06/10/21 1137    Lab Status: Final result Specimen: Blood from Arm, Left Updated: 06/10/21 1200     Troponin I <0 03 ng/mL     Comprehensive metabolic panel [[id number]] Collected: 06/10/21 1137    Lab Status: Final result Specimen: Blood from Arm, Left Updated: 06/10/21 1200     Sodium 139 mmol/L      Potassium 3 8 mmol/L      Chloride 104 mmol/L      CO2 26 mmol/L      ANION GAP 9 mmol/L      BUN 12 mg/dL      Creatinine 0 94 mg/dL      Glucose 122 mg/dL      Calcium 9 3 mg/dL      AST 18 U/L      ALT 33 U/L      Alkaline Phosphatase 68 5 U/L      Total Protein 7 5 g/dL      Albumin 4 4 g/dL      Total Bilirubin 0 33 mg/dL      eGFR 113 ml/min/1 73sq m     Narrative:      Brett guidelines for Chronic Kidney Disease (CKD):     Stage 1 with normal or high GFR (GFR > 90 mL/min/1 73 square meters)    Stage 2 Mild CKD (GFR = 60-89 mL/min/1 73 square meters)    Stage 3A Moderate CKD (GFR = 45-59 mL/min/1 73 square meters)    Stage 3B Moderate CKD (GFR = 30-44 mL/min/1 73 square meters)    Stage 4 Severe CKD (GFR = 15-29 mL/min/1 73 square meters)    Stage 5 End Stage CKD (GFR <15 mL/min/1 73 square meters)  Note: GFR calculation is accurate only with a steady state creatinine    CBC and differential [[id number]]  (Abnormal) Collected: 06/10/21 1137    Lab Status: Final result Specimen: Blood from Arm, Left Updated: 06/10/21 1143 WBC 6 93 Thousand/uL      RBC 4 86 Million/uL      Hemoglobin 13 0 g/dL      Hematocrit 40 1 %      MCV 83 fL      MCH 26 7 pg      MCHC 32 4 g/dL      RDW 14 9 %      MPV 10 6 fL      Platelets 344 Thousands/uL      Neutrophils Relative 64 %      Immat GRANS % 0 %      Lymphocytes Relative 24 %      Monocytes Relative 10 %      Eosinophils Relative 1 %      Basophils Relative 1 %      Neutrophils Absolute 4 45 Thousands/µL      Immature Grans Absolute 0 03 Thousand/uL      Lymphocytes Absolute 1 66 Thousands/µL      Monocytes Absolute 0 67 Thousand/µL      Eosinophils Absolute 0 08 Thousand/µL      Basophils Absolute 0 04 Thousands/µL                  XR chest 2 views    (Results Pending)              Procedures  ECG 12 Lead Documentation Only    Date/Time: 6/10/2021 11:39 AM  Performed by: Alan Ken PA-C  Authorized by: Alan Ken PA-C     Indications / Diagnosis:  Cp  ECG reviewed by me, the ED Provider: yes    Patient location:  ED  Previous ECG:     Comparison to cardiac monitor: Yes    Interpretation:     Interpretation: normal    Rate:     ECG rate:  64    ECG rate assessment: normal    Rhythm:     Rhythm: sinus rhythm    Comments:      No acute ischemic changes             ED Course             HEART Risk Score      Most Recent Value   Heart Score Risk Calculator   History  0 Filed at: 06/10/2021 1228   ECG  0 Filed at: 06/10/2021 1228   Age  0 Filed at: 06/10/2021 1228   Risk Factors  1 Filed at: 06/10/2021 1228   Troponin  0 Filed at: 06/10/2021 1228   HEART Score  1 Filed at: 06/10/2021 1228                                    MDM  Number of Diagnoses or Management Options  Diagnosis management comments: HEART score 1 and pt just evaluated in ED for cp, had recent cardiac evaluation, wnl, pain less likely cardiac in nature, will DC to FU       Amount and/or Complexity of Data Reviewed  Clinical lab tests: reviewed and ordered  Tests in the radiology section of CPT®: reviewed and ordered  Review and summarize past medical records: yes        Disposition  Final diagnoses:   Atypical chest pain     Time reflects when diagnosis was documented in both MDM as applicable and the Disposition within this note     Time User Action Codes Description Comment    6/10/2021 12:42 PM Darrell Pallas Add [R07 89] Atypical chest pain       ED Disposition     ED Disposition Condition Date/Time Comment    Discharge Stable u Helder 10, 2021 12:42 PM Hi Mcnulty discharge to home/self care  Follow-up Information     Follow up With Specialties Details Why Contact Info    Johanne Peña MD Internal Medicine  As needed [id number] [facility]  [phone]            Patient's Medications   Discharge Prescriptions    No medications on file     No discharge procedures on file      PDMP Review     None          ED Provider  Electronically Signed by           Julia Hwang PA-C  06/10/21 5177 No

## 2021-06-16 LAB
ATRIAL RATE: 64 BPM
P AXIS: 23 DEGREES
PR INTERVAL: 141 MS
QRS AXIS: 67 DEGREES
QRSD INTERVAL: 92 MS
QT INTERVAL: 397 MS
QTC INTERVAL: 410 MS
T WAVE AXIS: 60 DEGREES
VENTRICULAR RATE: 64 BPM

## 2021-06-16 PROCEDURE — 93010 ELECTROCARDIOGRAM REPORT: CPT | Performed by: INTERNAL MEDICINE

## 2021-06-21 DIAGNOSIS — R07.9 CHEST PAIN, UNSPECIFIED TYPE: ICD-10-CM

## 2021-06-21 RX ORDER — PANTOPRAZOLE SODIUM 40 MG/1
40 TABLET, DELAYED RELEASE ORAL
Qty: 180 TABLET | Refills: 1 | Status: SHIPPED | OUTPATIENT
Start: 2021-06-21 | End: 2021-10-28 | Stop reason: SDUPTHER

## 2021-07-11 ENCOUNTER — APPOINTMENT (EMERGENCY)
Dept: RADIOLOGY | Facility: HOSPITAL | Age: 46
End: 2021-07-11
Payer: COMMERCIAL

## 2021-07-11 ENCOUNTER — HOSPITAL ENCOUNTER (EMERGENCY)
Facility: HOSPITAL | Age: 46
Discharge: HOME | End: 2021-07-11
Attending: EMERGENCY MEDICINE
Payer: COMMERCIAL

## 2021-07-11 VITALS
WEIGHT: 191 LBS | SYSTOLIC BLOOD PRESSURE: 139 MMHG | TEMPERATURE: 98.8 F | BODY MASS INDEX: 26.64 KG/M2 | HEART RATE: 52 BPM | RESPIRATION RATE: 12 BRPM | OXYGEN SATURATION: 96 % | DIASTOLIC BLOOD PRESSURE: 78 MMHG

## 2021-07-11 DIAGNOSIS — I10 HYPERTENSION, UNSPECIFIED TYPE: ICD-10-CM

## 2021-07-11 DIAGNOSIS — R07.9 CHEST PAIN: Primary | ICD-10-CM

## 2021-07-11 LAB
ALBUMIN SERPL BCP-MCNC: 3.8 G/DL (ref 3.5–5)
ALP SERPL-CCNC: 83 U/L (ref 46–116)
ALT SERPL W P-5'-P-CCNC: 66 U/L (ref 12–78)
ANION GAP SERPL CALCULATED.3IONS-SCNC: 4 MMOL/L (ref 4–13)
AST SERPL W P-5'-P-CCNC: 24 U/L (ref 5–45)
BASOPHILS # BLD AUTO: 0.06 THOUSANDS/ΜL (ref 0–0.1)
BASOPHILS NFR BLD AUTO: 1 % (ref 0–1)
BILIRUB SERPL-MCNC: 0.17 MG/DL (ref 0.2–1)
BUN SERPL-MCNC: 9 MG/DL (ref 5–25)
CALCIUM SERPL-MCNC: 8.5 MG/DL (ref 8.3–10.1)
CHLORIDE SERPL-SCNC: 107 MMOL/L (ref 100–108)
CO2 SERPL-SCNC: 32 MMOL/L (ref 21–32)
CREAT SERPL-MCNC: 1.08 MG/DL (ref 0.6–1.3)
D DIMER PPP FEU-MCNC: <0.27 UG/ML FEU
EOSINOPHIL # BLD AUTO: 0.18 THOUSAND/ΜL (ref 0–0.61)
EOSINOPHIL NFR BLD AUTO: 3 % (ref 0–6)
ERYTHROCYTE [DISTWIDTH] IN BLOOD BY AUTOMATED COUNT: 14.4 % (ref 11.6–15.1)
GFR SERPL CREATININE-BSD FRML MDRD: 95 ML/MIN/1.73SQ M
GLUCOSE SERPL-MCNC: 124 MG/DL (ref 65–140)
HCT VFR BLD AUTO: 40.5 % (ref 36.5–49.3)
HGB BLD-MCNC: 12.7 G/DL (ref 12–17)
IMM GRANULOCYTES # BLD AUTO: 0.05 THOUSAND/UL (ref 0–0.2)
IMM GRANULOCYTES NFR BLD AUTO: 1 % (ref 0–2)
LYMPHOCYTES # BLD AUTO: 1.56 THOUSANDS/ΜL (ref 0.6–4.47)
LYMPHOCYTES NFR BLD AUTO: 23 % (ref 14–44)
MCH RBC QN AUTO: 27.1 PG (ref 26.8–34.3)
MCHC RBC AUTO-ENTMCNC: 31.4 G/DL (ref 31.4–37.4)
MCV RBC AUTO: 86 FL (ref 82–98)
MONOCYTES # BLD AUTO: 0.58 THOUSAND/ΜL (ref 0.17–1.22)
MONOCYTES NFR BLD AUTO: 8 % (ref 4–12)
NEUTROPHILS # BLD AUTO: 4.51 THOUSANDS/ΜL (ref 1.85–7.62)
NEUTS SEG NFR BLD AUTO: 64 % (ref 43–75)
NRBC BLD AUTO-RTO: 0 /100 WBCS
PLATELET # BLD AUTO: 265 THOUSANDS/UL (ref 149–390)
PMV BLD AUTO: 10 FL (ref 8.9–12.7)
POTASSIUM SERPL-SCNC: 4.9 MMOL/L (ref 3.5–5.3)
PROT SERPL-MCNC: 7.4 G/DL (ref 6.4–8.2)
RBC # BLD AUTO: 4.69 MILLION/UL (ref 3.88–5.62)
SODIUM SERPL-SCNC: 143 MMOL/L (ref 136–145)
TROPONIN I SERPL-MCNC: <0.02 NG/ML
TROPONIN I SERPL-MCNC: <0.02 NG/ML
WBC # BLD AUTO: 6.94 THOUSAND/UL (ref 4.31–10.16)

## 2021-07-11 PROCEDURE — 80053 COMPREHEN METABOLIC PANEL: CPT | Performed by: EMERGENCY MEDICINE

## 2021-07-11 PROCEDURE — 85025 COMPLETE CBC W/AUTO DIFF WBC: CPT | Performed by: EMERGENCY MEDICINE

## 2021-07-11 PROCEDURE — 99285 EMERGENCY DEPT VISIT HI MDM: CPT | Performed by: EMERGENCY MEDICINE

## 2021-07-11 PROCEDURE — 93005 ELECTROCARDIOGRAM TRACING: CPT

## 2021-07-11 PROCEDURE — 71045 X-RAY EXAM CHEST 1 VIEW: CPT

## 2021-07-11 PROCEDURE — 85379 FIBRIN DEGRADATION QUANT: CPT | Performed by: EMERGENCY MEDICINE

## 2021-07-11 PROCEDURE — 36415 COLL VENOUS BLD VENIPUNCTURE: CPT | Performed by: EMERGENCY MEDICINE

## 2021-07-11 PROCEDURE — 99285 EMERGENCY DEPT VISIT HI MDM: CPT

## 2021-07-11 PROCEDURE — 84484 ASSAY OF TROPONIN QUANT: CPT | Performed by: EMERGENCY MEDICINE

## 2021-07-11 RX ORDER — ASPIRIN 325 MG
325 TABLET ORAL ONCE
Status: COMPLETED | OUTPATIENT
Start: 2021-07-11 | End: 2021-07-11

## 2021-07-11 RX ADMIN — ASPIRIN 325 MG: 325 TABLET ORAL at 14:42

## 2021-07-11 NOTE — ED PROVIDER NOTES
History  Chief Complaint   Patient presents with    Chest Pain     Reported of sharp CP that went away right now  Reported of numbess to L arm, R arm and L sided of neck  Co of lightheadedness as well  NIH 0    Numbness     Patient states he was well today and coming back from the store when he noted sense of malaise and uneasiness  Started having numbness in the left arm and then is switched over to the right arm  He had some lightheadedness and mild shortness of breath  Patient states he pulled over in into our parking lot to wait and see if he should come in for evaluation  Patient states he has had episodes of chest pain, and has had a recent stress test which she reports was normal   He has no known CAD  He also recently has stopped his medication          Prior to Admission Medications   Prescriptions Last Dose Informant Patient Reported? Taking?    Blood Glucose Monitoring Suppl (ONE TOUCH ULTRA 2) w/Device KIT  Self No No   Sig: by Does not apply route 3 (three) times a day   Blood Pressure Monitoring (Blood Pressure Monitor/M Cuff) MISC  Self No No   Sig: Use 2 (two) times a day   Lancets (onetouch ultrasoft) lancets  Self No No   Sig: Use as instructed   amLODIPine (NORVASC) 10 mg tablet   No No   Sig: Take 1 tablet (10 mg total) by mouth daily   aspirin (ECOTRIN LOW STRENGTH) 81 mg EC tablet  Self No No   Sig: Take 1 tablet (81 mg total) by mouth daily   atorvastatin (LIPITOR) 20 mg tablet  Self No No   Sig: Take 1 tablet (20 mg total) by mouth daily   buPROPion (ZYBAN) 150 MG 12 hr tablet   No No   Sig: Take 1 tablet (150 mg total) by mouth daily   Patient not taking: Reported on 6/10/2021   glucose blood (OneTouch Ultra) test strip  Self No No   Sig: Use as instructed   magnesium gluconate (MAGONATE) 500 mg tablet  Self No No   Sig: Take 1 tablet (500 mg total) by mouth daily   metFORMIN (GLUCOPHAGE-XR) 500 mg 24 hr tablet  Self No No   Sig: Take 1 tablet (500 mg total) by mouth daily with lunch pantoprazole (PROTONIX) 40 mg tablet   No No   Sig: Take 1 tablet (40 mg total) by mouth 2 (two) times a day before meals   sertraline (ZOLOFT) 50 mg tablet   No No   Sig: Take 1 tablet (50 mg total) by mouth daily      Facility-Administered Medications: None       Past Medical History:   Diagnosis Date    Chest pain 1/12/2021    Diabetes mellitus ([facility] [address] )     Exposure to SARS-associated coronavirus 10/5/2020    Hypertension     Viral upper respiratory tract infection 10/7/2020       History reviewed  No pertinent surgical history  Family History   Problem Relation Age of Onset    Diabetes Mother     Diabetes Father      I have reviewed and agree with the history as documented  E-Cigarette/Vaping    E-Cigarette Use Never User      E-Cigarette/Vaping Substances     Social History     Tobacco Use    Smoking status: Current Every Day Smoker     Packs/day: 0 50     Years: 25 00     Pack years: 12 50     Types: Cigarettes     Start date: 2/18/1995    Smokeless tobacco: Never Used   Vaping Use    Vaping Use: Never used   Substance Use Topics    Alcohol use: Yes    Drug use: Never       Review of Systems   Constitutional: Negative for chills and fever  HENT: Negative for congestion and sore throat  Eyes: Negative for visual disturbance  Respiratory: Positive for shortness of breath  Cardiovascular: Positive for chest pain  Gastrointestinal: Negative for abdominal pain and vomiting  Genitourinary: Negative for dysuria  Musculoskeletal: Positive for arthralgias  Negative for back pain  Skin: Negative for rash  Neurological: Positive for weakness and numbness  Negative for syncope  Hematological: Does not bruise/bleed easily  Psychiatric/Behavioral: The patient is nervous/anxious  All other systems reviewed and are negative  Physical Exam  Physical Exam  Vitals and nursing note reviewed  Constitutional:       Appearance: He is well-developed     HENT:      Head: Normocephalic  Eyes:      Extraocular Movements: Extraocular movements intact  Pupils: Pupils are equal, round, and reactive to light  Cardiovascular:      Rate and Rhythm: Normal rate and regular rhythm  Heart sounds: Normal heart sounds  Pulmonary:      Effort: Pulmonary effort is normal       Breath sounds: Normal breath sounds  Chest:      Chest wall: Tenderness present  Abdominal:      General: Bowel sounds are normal       Palpations: Abdomen is soft  Musculoskeletal:         General: Normal range of motion  Cervical back: Normal range of motion and neck supple  Right lower leg: No tenderness  No edema  Left lower leg: No tenderness  No edema  Skin:     General: Skin is warm and dry  Capillary Refill: Capillary refill takes less than 2 seconds  Neurological:      General: No focal deficit present  Mental Status: He is alert     Psychiatric:         Mood and Affect: Mood normal          Behavior: Behavior normal          Vital Signs  ED Triage Vitals [07/11/21 1401]   Temperature Pulse Respirations Blood Pressure SpO2   98 8 °F (37 1 °C) 76 20 163/81 99 %      Temp Source Heart Rate Source Patient Position - Orthostatic VS BP Location FiO2 (%)   Oral Monitor Sitting Right arm --      Pain Score       No Pain           Vitals:    07/11/21 1530 07/11/21 1600 07/11/21 1700 07/11/21 1730   BP: 129/75 143/77 146/81 139/78   Pulse: 56 (!) 54 (!) 52 (!) 52   Patient Position - Orthostatic VS:             Visual Acuity      ED Medications  Medications   aspirin tablet 325 mg (325 mg Oral Given 7/11/21 1442)       Diagnostic Studies  Results Reviewed     Procedure Component Value Units Date/Time    Troponin I repeat in 3hrs [[id number]]  (Normal) Collected: 07/11/21 1715    Lab Status: Final result Specimen: Blood from Arm, Left Updated: 07/11/21 1738     Troponin I <0 02 ng/mL     Troponin I [[id number]]  (Normal) Collected: 07/11/21 1439    Lab Status: Final result Specimen: Blood from Arm, Left Updated: 07/11/21 1510     Troponin I <0 02 ng/mL     Comprehensive metabolic panel [[id number]]  (Abnormal) Collected: 07/11/21 1439    Lab Status: Final result Specimen: Blood from Arm, Left Updated: 07/11/21 1505     Sodium 143 mmol/L      Potassium 4 9 mmol/L      Chloride 107 mmol/L      CO2 32 mmol/L      ANION GAP 4 mmol/L      BUN 9 mg/dL      Creatinine 1 08 mg/dL      Glucose 124 mg/dL      Calcium 8 5 mg/dL      AST 24 U/L      ALT 66 U/L      Alkaline Phosphatase 83 U/L      Total Protein 7 4 g/dL      Albumin 3 8 g/dL      Total Bilirubin 0 17 mg/dL      eGFR 95 ml/min/1 73sq m     Narrative:      [employer] guidelines for Chronic Kidney Disease (CKD):     Stage 1 with normal or high GFR (GFR > 90 mL/min/1 73 square meters)    Stage 2 Mild CKD (GFR = 60-89 mL/min/1 73 square meters)    Stage 3A Moderate CKD (GFR = 45-59 mL/min/1 73 square meters)    Stage 3B Moderate CKD (GFR = 30-44 mL/min/1 73 square meters)    Stage 4 Severe CKD (GFR = 15-29 mL/min/1 73 square meters)    Stage 5 End Stage CKD (GFR <15 mL/min/1 73 square meters)  Note: GFR calculation is accurate only with a steady state creatinine    D-Dimer [[id number]]  (Normal) Collected: 07/11/21 1439    Lab Status: Final result Specimen: Blood from Arm, Left Updated: 07/11/21 1457     D-Dimer, Quant <0 27 ug/ml FEU     CBC and differential [[id number]] Collected: 07/11/21 1439    Lab Status: Final result Specimen: Blood from Arm, Left Updated: 07/11/21 1443     WBC 6 94 Thousand/uL      RBC 4 69 Million/uL      Hemoglobin 12 7 g/dL      Hematocrit 40 5 %      MCV 86 fL      MCH 27 1 pg      MCHC 31 4 g/dL      RDW 14 4 %      MPV 10 0 fL      Platelets 216 Thousands/uL      nRBC 0 /100 WBCs      Neutrophils Relative 64 %      Immat GRANS % 1 %      Lymphocytes Relative 23 %      Monocytes Relative 8 %      Eosinophils Relative 3 %      Basophils Relative 1 %      Neutrophils Absolute 4 51 Thousands/µL      Immature Grans Absolute 0 05 Thousand/uL      Lymphocytes Absolute 1 56 Thousands/µL      Monocytes Absolute 0 58 Thousand/µL      Eosinophils Absolute 0 18 Thousand/µL      Basophils Absolute 0 06 Thousands/µL                  XR chest 1 view portable    (Results Pending)              Procedures  ECG 12 Lead Documentation Only    Date/Time: 7/11/2021 1:57 PM  Performed by: Kylee Larson MD  Authorized by: Kylee Larson MD     Indications / Diagnosis:  Chest pain  Patient location:  ED  Interpretation:     Interpretation: normal    Rate:     ECG rate:  75    ECG rate assessment: normal    Rhythm:     Rhythm: sinus rhythm    Ectopy:     Ectopy: none    QRS:     QRS axis:  Normal    QRS intervals:  Normal  Conduction:     Conduction: normal    ST segments:     ST segments:  Normal  T waves:     T waves: normal               ED Course             HEART Risk Score      Most Recent Value   Heart Score Risk Calculator   History  1 Filed at: 07/11/2021 1752   ECG  0 Filed at: 07/11/2021 1752   Age  0 Filed at: 07/11/2021 1752   Risk Factors  2 Filed at: 07/11/2021 1752   Troponin  0 Filed at: 07/11/2021 1752   HEART Score  3 Filed at: 07/11/2021 1752                                    MDM  Number of Diagnoses or Management Options  Chest pain  Diagnosis management comments: Patient has multiple risk factors but had a negative stress test recently  Will check cardiac profile  Have encouraged smoking cessation      Disposition  Final diagnoses:   Chest pain     Time reflects when diagnosis was documented in both MDM as applicable and the Disposition within this note     Time User Action Codes Description Comment    7/11/2021  5:52 PM Darwin Stokes Add [R07 9] Chest pain       ED Disposition     ED Disposition Condition Date/Time Comment    Discharge Stable Sun Jul 11, 2021  5:52 PM Beth Ortega discharge to home/self care              Follow-up Information     Follow up With Specialties Details Why Jerri Rodriguez MD Internal Medicine Schedule an appointment as soon as possible for a visit   [address]  [phone]            Patient's Medications   Discharge Prescriptions    No medications on file     No discharge procedures on file      PDMP Review     None          ED Provider  Electronically Signed by           Elizabeth Crespo MD  07/11/21 8188

## 2021-07-14 LAB
ATRIAL RATE: 75 BPM
P AXIS: 19 DEGREES
PR INTERVAL: 136 MS
QRS AXIS: 41 DEGREES
QRSD INTERVAL: 88 MS
QT INTERVAL: 390 MS
QTC INTERVAL: 435 MS
T WAVE AXIS: 42 DEGREES
VENTRICULAR RATE: 75 BPM

## 2021-07-14 PROCEDURE — 93010 ELECTROCARDIOGRAM REPORT: CPT | Performed by: INTERNAL MEDICINE

## 2021-07-17 DIAGNOSIS — F41.9 ANXIETY: ICD-10-CM

## 2021-07-21 DIAGNOSIS — I10 HYPERTENSION, UNSPECIFIED TYPE: ICD-10-CM

## 2021-07-21 RX ORDER — AMLODIPINE BESYLATE 10 MG/1
10 TABLET ORAL DAILY
Qty: 90 TABLET | Refills: 1 | Status: SHIPPED | OUTPATIENT
Start: 2021-07-21 | End: 2022-06-13 | Stop reason: SDUPTHER

## 2021-08-17 ENCOUNTER — TELEPHONE (OUTPATIENT)
Dept: INTERNAL MEDICINE CLINIC | Facility: CLINIC | Age: 46
End: 2021-08-17

## 2021-08-17 NOTE — TELEPHONE ENCOUNTER
[facility] [place] sent us a medical certificate of health   Filled out according to previous form in chart, faxed back to [facility] and scanned to chart

## 2021-08-18 ENCOUNTER — APPOINTMENT (EMERGENCY)
Dept: RADIOLOGY | Facility: HOSPITAL | Age: 46
End: 2021-08-18
Payer: COMMERCIAL

## 2021-08-18 ENCOUNTER — HOSPITAL ENCOUNTER (EMERGENCY)
Facility: HOSPITAL | Age: 46
Discharge: HOME | End: 2021-08-18
Attending: EMERGENCY MEDICINE
Payer: COMMERCIAL

## 2021-08-18 VITALS
HEIGHT: 71 IN | SYSTOLIC BLOOD PRESSURE: 134 MMHG | WEIGHT: 188.4 LBS | TEMPERATURE: 98 F | DIASTOLIC BLOOD PRESSURE: 88 MMHG | HEART RATE: 62 BPM | OXYGEN SATURATION: 99 % | RESPIRATION RATE: 14 BRPM | BODY MASS INDEX: 26.38 KG/M2

## 2021-08-18 DIAGNOSIS — R07.89 ATYPICAL CHEST PAIN: Primary | ICD-10-CM

## 2021-08-18 LAB
ALBUMIN SERPL BCP-MCNC: 4.6 G/DL (ref 3.4–4.8)
ALP SERPL-CCNC: 92.4 U/L (ref 10–129)
ALT SERPL W P-5'-P-CCNC: 49 U/L (ref 5–63)
ANION GAP SERPL CALCULATED.3IONS-SCNC: 7 MMOL/L (ref 4–13)
AST SERPL W P-5'-P-CCNC: 25 U/L (ref 15–41)
BASOPHILS # BLD AUTO: 0.05 THOUSANDS/ΜL (ref 0–0.1)
BASOPHILS NFR BLD AUTO: 1 % (ref 0–1)
BILIRUB SERPL-MCNC: 0.45 MG/DL (ref 0.3–1.2)
BUN SERPL-MCNC: 12 MG/DL (ref 6–20)
CALCIUM SERPL-MCNC: 9.5 MG/DL (ref 8.4–10.2)
CHLORIDE SERPL-SCNC: 102 MMOL/L (ref 96–108)
CO2 SERPL-SCNC: 30 MMOL/L (ref 22–33)
CREAT SERPL-MCNC: 1.09 MG/DL (ref 0.5–1.2)
EOSINOPHIL # BLD AUTO: 0.18 THOUSAND/ΜL (ref 0–0.61)
EOSINOPHIL NFR BLD AUTO: 2 % (ref 0–6)
ERYTHROCYTE [DISTWIDTH] IN BLOOD BY AUTOMATED COUNT: 14.8 % (ref 11.6–15.1)
GFR SERPL CREATININE-BSD FRML MDRD: 94 ML/MIN/1.73SQ M
GLUCOSE SERPL-MCNC: 119 MG/DL (ref 65–140)
HCT VFR BLD AUTO: 42.1 % (ref 36.5–49.3)
HGB BLD-MCNC: 13.8 G/DL (ref 12–17)
IMM GRANULOCYTES # BLD AUTO: 0.03 THOUSAND/UL (ref 0–0.2)
IMM GRANULOCYTES NFR BLD AUTO: 0 % (ref 0–2)
LYMPHOCYTES # BLD AUTO: 2.11 THOUSANDS/ΜL (ref 0.6–4.47)
LYMPHOCYTES NFR BLD AUTO: 24 % (ref 14–44)
MCH RBC QN AUTO: 26.6 PG (ref 26.8–34.3)
MCHC RBC AUTO-ENTMCNC: 32.8 G/DL (ref 31.4–37.4)
MCV RBC AUTO: 81 FL (ref 82–98)
MONOCYTES # BLD AUTO: 1.04 THOUSAND/ΜL (ref 0.17–1.22)
MONOCYTES NFR BLD AUTO: 12 % (ref 4–12)
NEUTROPHILS # BLD AUTO: 5.28 THOUSANDS/ΜL (ref 1.85–7.62)
NEUTS SEG NFR BLD AUTO: 61 % (ref 43–75)
PLATELET # BLD AUTO: 312 THOUSANDS/UL (ref 149–390)
PMV BLD AUTO: 10.2 FL (ref 8.9–12.7)
POTASSIUM SERPL-SCNC: 3.7 MMOL/L (ref 3.5–5)
PROT SERPL-MCNC: 7.9 G/DL (ref 6.4–8.3)
RBC # BLD AUTO: 5.18 MILLION/UL (ref 3.88–5.62)
SODIUM SERPL-SCNC: 139 MMOL/L (ref 133–145)
TROPONIN I SERPL-MCNC: <0.03 NG/ML (ref 0–0.07)
WBC # BLD AUTO: 8.69 THOUSAND/UL (ref 4.31–10.16)

## 2021-08-18 PROCEDURE — 84484 ASSAY OF TROPONIN QUANT: CPT

## 2021-08-18 PROCEDURE — 96374 THER/PROPH/DIAG INJ IV PUSH: CPT

## 2021-08-18 PROCEDURE — 85025 COMPLETE CBC W/AUTO DIFF WBC: CPT

## 2021-08-18 PROCEDURE — 99285 EMERGENCY DEPT VISIT HI MDM: CPT | Performed by: EMERGENCY MEDICINE

## 2021-08-18 PROCEDURE — 93005 ELECTROCARDIOGRAM TRACING: CPT

## 2021-08-18 PROCEDURE — 71045 X-RAY EXAM CHEST 1 VIEW: CPT

## 2021-08-18 PROCEDURE — 99285 EMERGENCY DEPT VISIT HI MDM: CPT

## 2021-08-18 PROCEDURE — 80053 COMPREHEN METABOLIC PANEL: CPT

## 2021-08-18 PROCEDURE — 36415 COLL VENOUS BLD VENIPUNCTURE: CPT

## 2021-08-18 RX ORDER — KETOROLAC TROMETHAMINE 30 MG/ML
15 INJECTION, SOLUTION INTRAMUSCULAR; INTRAVENOUS ONCE
Status: COMPLETED | OUTPATIENT
Start: 2021-08-18 | End: 2021-08-18

## 2021-08-18 RX ADMIN — KETOROLAC TROMETHAMINE 15 MG: 30 INJECTION, SOLUTION INTRAMUSCULAR; INTRAVENOUS at 19:08

## 2021-08-18 NOTE — Clinical Note
Chris Flores was seen and treated in our emergency department on 8/18/2021  Diagnosis:     Cindy Huffman  may return to work on return date  He may return on this date: 08/20/2021         If you have any questions or concerns, please don't hesitate to call        Seth Evans MD    ______________________________           _______________          _______________  Hospital Representative                              Date                                Time

## 2021-08-18 NOTE — Clinical Note
Rogerio Haider was seen and treated in our emergency department on 8/18/2021  Diagnosis:     Wagner Lua  may return to work on return date  He may return on this date: 08/20/2021         If you have any questions or concerns, please don't hesitate to call        Molina Pickett MD    ______________________________           _______________          _______________  Hospital Representative                              Date                                Time

## 2021-08-19 LAB
ATRIAL RATE: 72 BPM
P AXIS: 23 DEGREES
PR INTERVAL: 147 MS
QRS AXIS: 65 DEGREES
QRSD INTERVAL: 93 MS
QT INTERVAL: 400 MS
QTC INTERVAL: 435 MS
T WAVE AXIS: 45 DEGREES
VENTRICULAR RATE: 71 BPM

## 2021-08-19 PROCEDURE — 93010 ELECTROCARDIOGRAM REPORT: CPT | Performed by: INTERNAL MEDICINE

## 2021-08-19 NOTE — ED PROVIDER NOTES
Final Diagnosis:  1  Atypical chest pain        Chief Complaint   Patient presents with    Chest Pain     intermittent "sharp" chest pain since 0900, denies additional symptoms  HPI  Patient presents with sharp chest pain started this AM  At rest, no SOB, no n/v, felt clammy, no witnessed diaphoresis  Resolved and patient took a nap  Woke up, anxious about it, wants it checked out  Had prior stress in April without ischemia  Currently asymptomatc  We also discuss incidental finding on his prior CT scan, and f/u in 12 mo  He will reduce and try to quit smoking      - No language barrier    - History obtained from patient  - There are no limitations to the history obtained  - Previous charting underwent limited review with attention to last ED visits, labs, ekgs, and prior imaging  PMH:   has a past medical history of Chest pain (1/12/2021), Diabetes mellitus ([facility] [address] ), Exposure to SARS-associated coronavirus (10/5/2020), Hypertension, and Viral upper respiratory tract infection (10/7/2020)  PSH:   has no past surgical history on file  Social History:  Long history of smoking    ROS:  Review of Systems   Constitutional: Negative for chills and fever  HENT: Negative for ear pain and sore throat  Eyes: Negative for pain and visual disturbance  Respiratory: Negative for cough and shortness of breath  Cardiovascular: Positive for chest pain  Negative for palpitations  Gastrointestinal: Negative for abdominal pain and vomiting  Genitourinary: Negative for dysuria and hematuria  Musculoskeletal: Negative for arthralgias and back pain  Skin: Negative for color change and rash  Neurological: Negative for seizures and syncope  All other systems reviewed and are negative         PE:     Physical exam highlights:   Physical Exam       Vitals:    08/18/21 1710 08/18/21 1907   BP: 146/94 134/88   BP Location: Left arm Left arm   Pulse: 74 62   Resp: 18 14   Temp: 98 °F (36 7 °C)    TempSrc: Oral SpO2: 99% 99%   Weight: 85 5 kg (188 lb 6 4 oz)    Height: 5' 11" (1 803 m)      Vitals reviewed by me  Nursing note reviewed  Chaperone present for all sensitive exam   Const: No acute distress  Alert  Nontoxic  Not diaphoretic  HEENT: External ears normal  No protrusion drainage swelling  Nose normal  No drainage/traumatic deformity  MMM  Mouth with baseline/symmetric movement  No trismus  Eyes: No squinting  No icterus  Tracks through the room with normal EOM  No tearing/swelling/drainage  Neck: ROM normal  No rigidity  No meningismus  Cards: Rate as per vitals  Compared to monitor sinus unless documented above  Regular  Well perfused  Pulm: able to verbalize without additional effort  Effort and excursion normal  No disress  No audible wheezing/ stridor  Normal resp rate  Abd: No distension beyond baseline  No fluctuant wave  Patient without peritoneal pain with shifting/bumping the bed  MSK: ROM normal and baseline  No deformity  Skin: No new rashes visible  Well perfused  Neuro: Nonfocal  Baseline  CN grossly intact  Moving all four with coordination  Psych: Normal behavior and affect  A:  - Nursing note reviewed  Ddx and MDM  ACS  Costochondritis  anxiety                   ED Course as of Aug 18 2200   Wed Aug 18, 2021   1715 Procedure Note: EKG  Date/Time: 08/18/21 5:15 PM   Interpreted by: Andrea Mobley  Indications / Diagnosis: CP  ECG reviewed by me, the ED Provider: yes   The EKG demonstrates:  Rhythm: sinus    Intervals: normal intervals  Axis: normal axis  QRS/Blocks: normal QRS  ST Changes: No acute ST Changes, no STD/EMELY    T wave changes: none            XR chest 1 view portable    (Results Pending)     Orders Placed This Encounter   Procedures    XR chest 1 view portable    CBC and differential    Comprehensive metabolic panel    Troponin I    ECG 12 lead     Labs Reviewed   CBC AND DIFFERENTIAL - Abnormal       Result Value Ref Range Status    WBC 8 69  4 31 - 10 16 Thousand/uL Final    RBC 5 18  3 88 - 5 62 Million/uL Final    Hemoglobin 13 8  12 0 - 17 0 g/dL Final    Hematocrit 42 1  36 5 - 49 3 % Final    MCV 81 (*) 82 - 98 fL Final    MCH 26 6 (*) 26 8 - 34 3 pg Final    MCHC 32 8  31 4 - 37 4 g/dL Final    RDW 14 8  11 6 - 15 1 % Final    MPV 10 2  8 9 - 12 7 fL Final    Platelets 313  423 - 390 Thousands/uL Final    Neutrophils Relative 61  43 - 75 % Final    Immat GRANS % 0  0 - 2 % Final    Lymphocytes Relative 24  14 - 44 % Final    Monocytes Relative 12  4 - 12 % Final    Eosinophils Relative 2  0 - 6 % Final    Basophils Relative 1  0 - 1 % Final    Neutrophils Absolute 5 28  1 85 - 7 62 Thousands/µL Final    Immature Grans Absolute 0 03  0 00 - 0 20 Thousand/uL Final    Lymphocytes Absolute 2 11  0 60 - 4 47 Thousands/µL Final    Monocytes Absolute 1 04  0 17 - 1 22 Thousand/µL Final    Eosinophils Absolute 0 18  0 00 - 0 61 Thousand/µL Final    Basophils Absolute 0 05  0 00 - 0 10 Thousands/µL Final   TROPONIN I - Normal    Troponin I <0 03  0 00 - 0 07 ng/mL Final    Comment: [employer] Chemistry analyzer 99% cutoff is > 0 03ng/mL    o cTnl 99% cutoff is useful only when applied to patients in the clinical setting of myocardial ischemia  o cTnl 99% cutoff should be interpreted in the context of clinical history, ECG findings and possibly cardiac imaging to establish correct diagnosis  o cTnl 99% cutoff may be suggestive but clearly not indicative of a coronary event without the clinical setting of myocardial ischemia     COMPREHENSIVE METABOLIC PANEL    Sodium 222  133 - 145 mmol/L Final    Potassium 3 7  3 5 - 5 0 mmol/L Final    Chloride 102  96 - 108 mmol/L Final    CO2 30  22 - 33 mmol/L Final    ANION GAP 7  4 - 13 mmol/L Final    BUN 12  6 - 20 mg/dL Final    Creatinine 1 09  0 50 - 1 20 mg/dL Final    Comment: Standardized to IDMS reference method    Glucose 119  65 - 140 mg/dL Final    Comment: If the patient is fasting, the ADA then defines impaired fasting glucose as > 100 mg/dL and diabetes as > or equal to 123 mg/dL  Specimen collection should occur prior to Sulfasalazine administration due to the potential for falsely depressed results  Specimen collection should occur prior to Sulfapyridine administration due to the potential for falsely elevated results  Calcium 9 5  8 4 - 10 2 mg/dL Final    AST 25  15 - 41 U/L Final    Comment: Specimen collection should occur prior to Sulfasalazine administration due to the potential for falsely depressed results  ALT 49  5 - 63 U/L Final    Comment: Specimen collection should occur prior to Sulfasalazine administration due to the potential for falsely depressed results  Alkaline Phosphatase 92 4  10 - 129 U/L Final    Total Protein 7 9  6 4 - 8 3 g/dL Final    Albumin 4 6  3 4 - 4 8 g/dL Final    Total Bilirubin 0 45  0 30 - 1 20 mg/dL Final    eGFR 94  ml/min/1 73sq m Final    Narrative:     [place] guidelines for Chronic Kidney Disease (CKD):     Stage 1 with normal or high GFR (GFR > 90 mL/min/1 73 square meters)    Stage 2 Mild CKD (GFR = 60-89 mL/min/1 73 square meters)    Stage 3A Moderate CKD (GFR = 45-59 mL/min/1 73 square meters)    Stage 3B Moderate CKD (GFR = 30-44 mL/min/1 73 square meters)    Stage 4 Severe CKD (GFR = 15-29 mL/min/1 73 square meters)    Stage 5 End Stage CKD (GFR <15 mL/min/1 73 square meters)  Note: GFR calculation is accurate only with a steady state creatinine   LIGHT BLUE TOP       Final Diagnosis:  1  Atypical chest pain        P:  - hospital tx includes toradol   - disposition home  - patient to follow with    - patient will call their PCP to let them know they were in the emergency department   We discuss return precautions     Medications   ketorolac (TORADOL) injection 15 mg (15 mg Intravenous Given 8/18/21 1908)     Time reflects when diagnosis was documented in both MDM as applicable and the Disposition within this note     Time User Action Codes Description Comment    8/18/2021  7:05 PM Elizabeth Blanchard Add [R07 89] Atypical chest pain       ED Disposition     ED Disposition Condition Date/Time Comment    Discharge Stable Wed Aug 18, 2021  7:05 PM Tarsha Daly discharge to home/self care              Follow-up Information     Follow up With Specialties Details Why Agustin David Banerjee MD Internal Medicine   [facility] [address] [place] [phone]          Discharge Medication List as of 8/18/2021  7:13 PM      CONTINUE these medications which have NOT CHANGED    Details   amLODIPine (NORVASC) 10 mg tablet Take 1 tablet (10 mg total) by mouth daily, Starting Wed 7/21/2021, Until Tue 10/19/2021, Normal      aspirin (ECOTRIN LOW STRENGTH) 81 mg EC tablet Take 1 tablet (81 mg total) by mouth daily, Starting Tue 3/9/2021, Normal      atorvastatin (LIPITOR) 20 mg tablet Take 1 tablet (20 mg total) by mouth daily, Starting Tue 3/9/2021, Normal      metFORMIN (GLUCOPHAGE-XR) 500 mg 24 hr tablet Take 1 tablet (500 mg total) by mouth daily with lunch, Starting Tue 3/9/2021, Normal      sertraline (ZOLOFT) 50 mg tablet TAKE 1 TABLET(50 MG) BY MOUTH DAILY, Normal      Blood Glucose Monitoring Suppl (ONE TOUCH ULTRA 2) w/Device KIT by Does not apply route 3 (three) times a day, Starting Thu 9/3/2020, Normal      Blood Pressure Monitoring (Blood Pressure Monitor/M Cuff) MISC Use 2 (two) times a day, Starting Wed 3/10/2021, Normal      buPROPion (ZYBAN) 150 MG 12 hr tablet Take 1 tablet (150 mg total) by mouth daily, Starting Sat 5/1/2021, Normal      glucose blood (OneTouch Ultra) test strip Use as instructed, Normal      Lancets (onetouch ultrasoft) lancets Use as instructed, Normal      magnesium gluconate (MAGONATE) 500 mg tablet Take 1 tablet (500 mg total) by mouth daily, Starting Wed 3/17/2021, Normal      pantoprazole (PROTONIX) 40 mg tablet Take 1 tablet (40 mg total) by mouth 2 (two) times a day before meals, Starting Mon 6/21/2021, Normal           No discharge procedures on file  Prior to Admission Medications   Prescriptions Last Dose Informant Patient Reported? Taking? Blood Glucose Monitoring Suppl (ONE TOUCH ULTRA 2) w/Device KIT Unknown at Unknown time Self No No   Sig: by Does not apply route 3 (three) times a day   Blood Pressure Monitoring (Blood Pressure Monitor/M Cuff) MISC Unknown at Unknown time Self No No   Sig: Use 2 (two) times a day   Lancets (onetouch ultrasoft) lancets Unknown at Unknown time Self No No   Sig: Use as instructed   amLODIPine (NORVASC) 10 mg tablet 8/17/2021 at Unknown time  No Yes   Sig: Take 1 tablet (10 mg total) by mouth daily   aspirin (ECOTRIN LOW STRENGTH) 81 mg EC tablet 8/18/2021 at Unknown time Self No Yes   Sig: Take 1 tablet (81 mg total) by mouth daily   atorvastatin (LIPITOR) 20 mg tablet 8/17/2021 at Unknown time Self No Yes   Sig: Take 1 tablet (20 mg total) by mouth daily   buPROPion (ZYBAN) 150 MG 12 hr tablet More than a month at Unknown time  No No   Sig: Take 1 tablet (150 mg total) by mouth daily   Patient not taking: Reported on 6/10/2021   glucose blood (OneTouch Ultra) test strip Unknown at Unknown time Self No No   Sig: Use as instructed   magnesium gluconate (MAGONATE) 500 mg tablet More than a month at Unknown time Self No No   Sig: Take 1 tablet (500 mg total) by mouth daily   metFORMIN (GLUCOPHAGE-XR) 500 mg 24 hr tablet 8/17/2021 at Unknown time Self No Yes   Sig: Take 1 tablet (500 mg total) by mouth daily with lunch   pantoprazole (PROTONIX) 40 mg tablet More than a month at Unknown time  No No   Sig: Take 1 tablet (40 mg total) by mouth 2 (two) times a day before meals   sertraline (ZOLOFT) 50 mg tablet 8/17/2021 at Unknown time  No Yes   Sig: TAKE 1 TABLET(50 MG) BY MOUTH DAILY      Facility-Administered Medications: None       Portions of the record may have been created with voice recognition software   Occasional wrong word or "sound a like" substitutions may have occurred due to the inherent limitations of voice recognition software  Read the chart carefully and recognize, using context, where substitutions have occurred      Electronically signed by:  MD Mauro Barcenas MD  08/18/21 [phone]

## 2021-08-24 PROCEDURE — 93010 ELECTROCARDIOGRAM REPORT: CPT | Performed by: INTERNAL MEDICINE

## 2021-09-15 ENCOUNTER — OFFICE VISIT (OUTPATIENT)
Dept: INTERNAL MEDICINE CLINIC | Facility: CLINIC | Age: 46
End: 2021-09-15
Payer: COMMERCIAL

## 2021-09-15 VITALS
DIASTOLIC BLOOD PRESSURE: 86 MMHG | BODY MASS INDEX: 26.32 KG/M2 | TEMPERATURE: 97.8 F | OXYGEN SATURATION: 99 % | HEIGHT: 71 IN | HEART RATE: 76 BPM | SYSTOLIC BLOOD PRESSURE: 126 MMHG | WEIGHT: 188 LBS

## 2021-09-15 DIAGNOSIS — F41.9 ANXIETY: ICD-10-CM

## 2021-09-15 DIAGNOSIS — Z00.00 HEALTHCARE MAINTENANCE: ICD-10-CM

## 2021-09-15 DIAGNOSIS — E11.9 TYPE 2 DIABETES MELLITUS WITHOUT COMPLICATION, WITHOUT LONG-TERM CURRENT USE OF INSULIN (HCC): Primary | ICD-10-CM

## 2021-09-15 LAB — SL AMB POCT HEMOGLOBIN AIC: 6.5 (ref ?–6.5)

## 2021-09-15 PROCEDURE — 83036 HEMOGLOBIN GLYCOSYLATED A1C: CPT | Performed by: HOSPITALIST

## 2021-09-15 PROCEDURE — 3008F BODY MASS INDEX DOCD: CPT | Performed by: HOSPITALIST

## 2021-09-15 PROCEDURE — 3044F HG A1C LEVEL LT 7.0%: CPT | Performed by: HOSPITALIST

## 2021-09-15 PROCEDURE — 4004F PT TOBACCO SCREEN RCVD TLK: CPT | Performed by: HOSPITALIST

## 2021-09-15 PROCEDURE — 99213 OFFICE O/P EST LOW 20 MIN: CPT | Performed by: HOSPITALIST

## 2021-09-15 NOTE — ASSESSMENT & PLAN NOTE
Lab Results   Component Value Date    HGBA1C 6 5 09/15/2021     ·  continue metformin 500 daily  ·   To schedule ophthalmology visit soon  ·  diabetic foot exam done this visit-risk category 0  ·  will obtain microalbumin/creatinine urine ratio

## 2021-09-15 NOTE — ASSESSMENT & PLAN NOTE
·  1 time screen for HCV and HIV  ·  educated regarding COVID vaccine, patient agreeable to getting COVID vaccine and planning to get vaccinated soon

## 2021-09-15 NOTE — ASSESSMENT & PLAN NOTE
·   Was started on sertraline 50 mg once a day in July 2021  ·  was also previously on bupropion although patient states that both sertraline or bupropion that he has tried does not help control his anxiety and that only Xanax is able to control this which he has been taking for the last 10 days 0 5 mg daily of Xanax which she has obtained from a non physician/non medical source  ·  will refer to Psychiatry

## 2021-09-15 NOTE — LETTER
September 15, 2021     Patient: Rylie Andrea   YOB: 1975   Date of Visit: 9/15/2021       To Whom it May Concern:    Coronel Clarisa is under my professional care  He was seen in my office on 9/15/2021  He may return to work on 9/16/2021 with no restrictions  If you have any questions or concerns, please don't hesitate to call           Sincerely,          Tahir Dukes MD        CC: No Recipients

## 2021-09-15 NOTE — PROGRESS NOTES
Assessment/Plan:    Type 2 diabetes mellitus (HCC)    Lab Results   Component Value Date    HGBA1C 6 5 09/15/2021     ·  continue metformin 500 daily  ·   To schedule ophthalmology visit soon  ·  diabetic foot exam done this visit-risk category 0  ·  will obtain microalbumin/creatinine urine ratio    Healthcare maintenance  ·  1 time screen for HCV and HIV  ·  educated regarding COVID vaccine, patient agreeable to getting COVID vaccine and planning to get vaccinated soon    Anxiety  ·   Was started on sertraline 50 mg once a day in July 2021  ·  was also previously on bupropion although patient states that both sertraline or bupropion that he has tried does not help control his anxiety and that only Xanax is able to control this which he has been taking for the last 10 days 0 5 mg daily of Xanax which she has obtained from a non physician/non medical source  ·  will refer to Psychiatry       Diagnoses and all orders for this visit:    Type 2 diabetes mellitus without complication, without long-term current use of insulin (HCC)  -     POCT hemoglobin A1c  -     Microalbumin / creatinine urine ratio    Healthcare maintenance  -     HIV 1/2 Antigen/Antibody (4th Generation) w Reflex [facility]; Future  -     Hepatitis C antibody; Future    Anxiety  -     Ambulatory referral to Psychiatry; Future          Subjective:      Patient ID: Colten Dacosta is a 40 y o  male  HPI     I had the pleasure of seeing Mr Kim Alfred in the office today who came in for a follow-up visit  He states that he is overall doing well  He was seen in the ED on 08/18/2021 due to chest pain, workup was negative and he was discharged from the ED  Similarly in April he was admitted due to atypical chest pain, workup was negative also then and was believed to be most likely musculoskeletal in nature  Since his ER visit in August, he has has not had any recurrence of the chest pain        he does have a history of anxiety for which he was started on sertraline for a few months ago, he had also tried Wellbutrin before  He states that both these medications have not helped and that for the last 10 days he has been taking Xanax and that only Xanax helps with controlling his anxiety  Xanax not obtained via prescription from physician  The following portions of the patient's history were reviewed and updated as appropriate: allergies, current medications, past family history, past medical history, past social history, past surgical history and problem list     Review of Systems   Constitutional: Negative for chills and fever  HENT: Negative for congestion and trouble swallowing  Respiratory: Negative for cough and shortness of breath  Cardiovascular: Negative for chest pain and leg swelling  Gastrointestinal: Negative for constipation, diarrhea, nausea and vomiting  Genitourinary: Negative for dysuria and hematuria  Musculoskeletal: Negative for arthralgias  Skin: Negative for color change  Neurological: Negative for dizziness and light-headedness  Psychiatric/Behavioral: Negative for agitation, confusion and suicidal ideas  All other systems reviewed and are negative  Objective:      /86 (BP Location: Right arm, Patient Position: Sitting, Cuff Size: Large)   Pulse 76   Temp 97 8 °F (36 6 °C)   Ht 5' 11" (1 803 m)   Wt 85 3 kg (188 lb)   SpO2 99%   BMI 26 22 kg/m²          Physical Exam  Vitals reviewed  Constitutional:       General: He is not in acute distress  Appearance: He is not ill-appearing  Eyes:      General: No scleral icterus  Cardiovascular:      Rate and Rhythm: Normal rate and regular rhythm  Pulses: no weak pulses          Dorsalis pedis pulses are 2+ on the right side and 2+ on the left side  Heart sounds: No murmur heard  Pulmonary:      Effort: Pulmonary effort is normal  No respiratory distress  Breath sounds: No wheezing or rales     Abdominal:      General: Bowel sounds are normal    Musculoskeletal:      Right lower leg: No edema  Left lower leg: No edema  Feet:    Feet:      Right foot:      Skin integrity: No ulcer, skin breakdown, erythema, warmth, callus or dry skin  Left foot:      Skin integrity: No ulcer, skin breakdown, erythema, warmth, callus or dry skin  Skin:     General: Skin is warm  Neurological:      General: No focal deficit present  Mental Status: He is alert and oriented to person, place, and time  Psychiatric:         Mood and Affect: Mood normal          Thought Content: Thought content normal        Patient's shoes and socks removed  Right Foot/Ankle   Right Foot Inspection  Skin Exam: skin normal and skin intact no dry skin, no warmth, no callus, no erythema, no maceration, no abnormal color, no pre-ulcer, no ulcer and no callus                          Toe Exam: ROM and strength within normal limits  Sensory   Vibration: intact    Monofilament testing: intact  Vascular    The right DP pulse is 2+  Left Foot/Ankle  Left Foot Inspection  Skin Exam: skin normal and skin intactno dry skin, no warmth, no erythema, no maceration, normal color, no pre-ulcer, no ulcer and no callus                         Toe Exam: ROM and strength within normal limits                   Sensory   Vibration: intact    Monofilament: intact  Vascular    The left DP pulse is 2+  Assign Risk Category:  No deformity present; No loss of protective sensation;  No weak pulses       Risk: 0

## 2021-09-22 ENCOUNTER — TELEPHONE (OUTPATIENT)
Dept: PSYCHIATRY | Facility: CLINIC | Age: 46
End: 2021-09-22

## 2021-09-30 ENCOUNTER — OFFICE VISIT (OUTPATIENT)
Dept: BEHAVIORAL/MENTAL HEALTH CLINIC | Facility: CLINIC | Age: 46
End: 2021-09-30

## 2021-09-30 VITALS — SYSTOLIC BLOOD PRESSURE: 140 MMHG | DIASTOLIC BLOOD PRESSURE: 90 MMHG | WEIGHT: 189 LBS | BODY MASS INDEX: 26.36 KG/M2

## 2021-09-30 DIAGNOSIS — F41.0 PANIC DISORDER WITHOUT AGORAPHOBIA: Primary | ICD-10-CM

## 2021-09-30 RX ORDER — ALPRAZOLAM 0.5 MG/1
0.5 TABLET ORAL DAILY PRN
Qty: 30 TABLET | Refills: 0 | Status: SHIPPED | OUTPATIENT
Start: 2021-09-30 | End: 2021-10-28
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
## 2021-09-30 NOTE — PSYCH
Name and Date of Birth:  Ross Stewart 40 y o  1975    Date of Treatment Plan: September 30, 2021    Diagnosis/Diagnoses:     1  Panic disorder without agoraphobia      Strengths/Personal Resources for Self-Care: supportive family, supportive friends, taking medications as prescribed, ability to adapt to life changes, ability to communicate needs, ability to communicate well, ability to listen, ability to reason, family ties, financial means, financial security, general fund of knowledge, good physical health, good understanding of illness, independence, motivation for treatment, ability to negotiate basic needs, Rastafari affiliation, being resoureceful, self-reliance, sense of humor, special hobby/interest, stable employment, strong veronica, well educated, willingness to work on problems, work skills  Area/Areas of need (in own words): anxiety symptoms, medication compliance  1  Long Term Goal: Decrease panic attack quantity and frequency  Target Date: 6 months - 3/30/2022  Person/Persons responsible for completion of goal: Fauzia Sharp    2  Short Term Objective (s) - How will we reach this goal?:     A  Provider new recommended medication/dosage changes and/or continue medication(s): continue current medications as prescribed  B   Try breathing exercises  C   Try relaxation techniques  Target Date: 6 months - 3/30/2022  Person/Persons Responsible for Completion of Goal: Fauzia Sharp     Progress Towards Goals: initiating treatment    Treatment Modality: medication management every 1 month    Review due 90 to 120 days from date of this plan: 4 months - 1/30/2022  Expected length of service: ongoing treatment unless revised    My Physician/PA/NP and I have developed this plan together and I agree to work on the goals and objectives  I understand the treatment goals that were developed for my treatment      Signature:  Verbal consent given due to COVID-19 Date and time: 9/30/21  Signature of parent/guardian if under age of 15 years: Date and time:  Signature of provider:      Date and time:  Signature of Supervising Physician:    Date and time: 9/30/2021      Charlie Khalil DO

## 2021-09-30 NOTE — LETTER
September 30, 2021     Patient: Harleen Noe   YOB: 1975   Date of Visit: 9/30/2021       To Whom it May Concern:    Issa Hallman is under my professional care  He was seen in my office on 9/30/2021  He may return to work on 10/1/21  If you have any questions or concerns, please don't hesitate to call  Sincerely,          Jeniffer Larios DO        CC: Tonya Austin

## 2021-10-28 ENCOUNTER — OFFICE VISIT (OUTPATIENT)
Dept: BEHAVIORAL/MENTAL HEALTH CLINIC | Facility: CLINIC | Age: 46
End: 2021-10-28

## 2021-10-28 ENCOUNTER — OFFICE VISIT (OUTPATIENT)
Dept: INTERNAL MEDICINE CLINIC | Facility: CLINIC | Age: 46
End: 2021-10-28
Payer: COMMERCIAL

## 2021-10-28 VITALS — DIASTOLIC BLOOD PRESSURE: 80 MMHG | BODY MASS INDEX: 26.89 KG/M2 | WEIGHT: 192.8 LBS | SYSTOLIC BLOOD PRESSURE: 140 MMHG

## 2021-10-28 VITALS
WEIGHT: 192 LBS | BODY MASS INDEX: 26.88 KG/M2 | SYSTOLIC BLOOD PRESSURE: 132 MMHG | HEART RATE: 92 BPM | HEIGHT: 71 IN | TEMPERATURE: 97.7 F | DIASTOLIC BLOOD PRESSURE: 84 MMHG | OXYGEN SATURATION: 99 %

## 2021-10-28 DIAGNOSIS — F41.0 PANIC DISORDER WITHOUT AGORAPHOBIA: ICD-10-CM

## 2021-10-28 DIAGNOSIS — Z12.11 COLON CANCER SCREENING: ICD-10-CM

## 2021-10-28 DIAGNOSIS — F41.0 PANIC DISORDER WITHOUT AGORAPHOBIA: Primary | ICD-10-CM

## 2021-10-28 DIAGNOSIS — Z00.00 PHYSICAL EXAM, ANNUAL: Primary | ICD-10-CM

## 2021-10-28 DIAGNOSIS — R07.9 CHEST PAIN, UNSPECIFIED TYPE: ICD-10-CM

## 2021-10-28 DIAGNOSIS — E11.9 TYPE 2 DIABETES MELLITUS WITHOUT COMPLICATION, WITHOUT LONG-TERM CURRENT USE OF INSULIN (HCC): ICD-10-CM

## 2021-10-28 DIAGNOSIS — G43.409 HEMIPLEGIC MIGRAINE WITHOUT STATUS MIGRAINOSUS, NOT INTRACTABLE: ICD-10-CM

## 2021-10-28 PROCEDURE — 3008F BODY MASS INDEX DOCD: CPT | Performed by: HOSPITALIST

## 2021-10-28 PROCEDURE — 99396 PREV VISIT EST AGE 40-64: CPT | Performed by: INTERNAL MEDICINE

## 2021-10-28 RX ORDER — SERTRALINE HYDROCHLORIDE 100 MG/1
100 TABLET, FILM COATED ORAL DAILY
Qty: 30 TABLET | Refills: 1 | Status: SHIPPED | OUTPATIENT
Start: 2021-10-28 | End: 2022-07-20

## 2021-10-28 RX ORDER — PANTOPRAZOLE SODIUM 40 MG/1
40 TABLET, DELAYED RELEASE ORAL
Qty: 180 TABLET | Refills: 1 | Status: SHIPPED | OUTPATIENT
Start: 2021-10-28 | End: 2022-05-16

## 2021-10-28 RX ORDER — UREA 10 %
500 LOTION (ML) TOPICAL DAILY
Qty: 30 TABLET | Refills: 2 | Status: SHIPPED | OUTPATIENT
Start: 2021-10-28

## 2021-10-28 RX ORDER — ALPRAZOLAM 0.5 MG/1
0.5 TABLET ORAL DAILY PRN
Qty: 30 TABLET | Refills: 0 | Status: CANCELLED | OUTPATIENT
Start: 2021-10-28 | End: 2021-11-27

## 2021-12-06 ENCOUNTER — TELEMEDICINE (OUTPATIENT)
Dept: INTERNAL MEDICINE CLINIC | Facility: CLINIC | Age: 46
End: 2021-12-06
Payer: COMMERCIAL

## 2021-12-06 DIAGNOSIS — J06.9 VIRAL UPPER RESPIRATORY ILLNESS: Primary | ICD-10-CM

## 2021-12-06 PROCEDURE — U0005 INFEC AGEN DETEC AMPLI PROBE: HCPCS | Performed by: INTERNAL MEDICINE

## 2021-12-06 PROCEDURE — U0003 INFECTIOUS AGENT DETECTION BY NUCLEIC ACID (DNA OR RNA); SEVERE ACUTE RESPIRATORY SYNDROME CORONAVIRUS 2 (SARS-COV-2) (CORONAVIRUS DISEASE [COVID-19]), AMPLIFIED PROBE TECHNIQUE, MAKING USE OF HIGH THROUGHPUT TECHNOLOGIES AS DESCRIBED BY CMS-2020-01-R: HCPCS | Performed by: INTERNAL MEDICINE

## 2021-12-06 PROCEDURE — 99213 OFFICE O/P EST LOW 20 MIN: CPT | Performed by: INTERNAL MEDICINE

## 2021-12-08 ENCOUNTER — TELEPHONE (OUTPATIENT)
Dept: INTERNAL MEDICINE CLINIC | Facility: CLINIC | Age: 46
End: 2021-12-08

## 2022-04-22 ENCOUNTER — APPOINTMENT (EMERGENCY)
Dept: RADIOLOGY | Facility: HOSPITAL | Age: 47
End: 2022-04-22

## 2022-04-22 ENCOUNTER — HOSPITAL ENCOUNTER (EMERGENCY)
Facility: HOSPITAL | Age: 47
Discharge: HOME | End: 2022-04-23
Attending: EMERGENCY MEDICINE | Admitting: EMERGENCY MEDICINE

## 2022-04-22 VITALS
BODY MASS INDEX: 25.9 KG/M2 | TEMPERATURE: 98.2 F | WEIGHT: 185 LBS | HEART RATE: 74 BPM | HEIGHT: 71 IN | SYSTOLIC BLOOD PRESSURE: 134 MMHG | DIASTOLIC BLOOD PRESSURE: 78 MMHG | OXYGEN SATURATION: 98 % | RESPIRATION RATE: 16 BRPM

## 2022-04-22 DIAGNOSIS — R07.89 ATYPICAL CHEST PAIN: Primary | ICD-10-CM

## 2022-04-22 LAB
ALBUMIN SERPL BCP-MCNC: 4 G/DL (ref 3.5–5)
ALP SERPL-CCNC: 97 U/L (ref 46–116)
ALT SERPL W P-5'-P-CCNC: 34 U/L (ref 12–78)
ANION GAP SERPL CALCULATED.3IONS-SCNC: 8 MMOL/L (ref 4–13)
AST SERPL W P-5'-P-CCNC: 20 U/L (ref 5–45)
BASOPHILS # BLD AUTO: 0.05 THOUSANDS/ΜL (ref 0–0.1)
BASOPHILS NFR BLD AUTO: 1 % (ref 0–1)
BILIRUB SERPL-MCNC: 0.14 MG/DL (ref 0.2–1)
BUN SERPL-MCNC: 10 MG/DL (ref 5–25)
CALCIUM SERPL-MCNC: 9.3 MG/DL (ref 8.3–10.1)
CARDIAC TROPONIN I PNL SERPL HS: 6 NG/L
CHLORIDE SERPL-SCNC: 104 MMOL/L (ref 100–108)
CO2 SERPL-SCNC: 29 MMOL/L (ref 21–32)
CREAT SERPL-MCNC: 1.16 MG/DL (ref 0.6–1.3)
EOSINOPHIL # BLD AUTO: 0.08 THOUSAND/ΜL (ref 0–0.61)
EOSINOPHIL NFR BLD AUTO: 1 % (ref 0–6)
ERYTHROCYTE [DISTWIDTH] IN BLOOD BY AUTOMATED COUNT: 15.9 % (ref 11.6–15.1)
GFR SERPL CREATININE-BSD FRML MDRD: 75 ML/MIN/1.73SQ M
GLUCOSE SERPL-MCNC: 139 MG/DL (ref 65–140)
HCT VFR BLD AUTO: 43.5 % (ref 36.5–49.3)
HGB BLD-MCNC: 14 G/DL (ref 12–17)
IMM GRANULOCYTES # BLD AUTO: 0.09 THOUSAND/UL (ref 0–0.2)
IMM GRANULOCYTES NFR BLD AUTO: 1 % (ref 0–2)
LYMPHOCYTES # BLD AUTO: 1.95 THOUSANDS/ΜL (ref 0.6–4.47)
LYMPHOCYTES NFR BLD AUTO: 25 % (ref 14–44)
MCH RBC QN AUTO: 26.8 PG (ref 26.8–34.3)
MCHC RBC AUTO-ENTMCNC: 32.2 G/DL (ref 31.4–37.4)
MCV RBC AUTO: 83 FL (ref 82–98)
MONOCYTES # BLD AUTO: 0.76 THOUSAND/ΜL (ref 0.17–1.22)
MONOCYTES NFR BLD AUTO: 10 % (ref 4–12)
NEUTROPHILS # BLD AUTO: 5.01 THOUSANDS/ΜL (ref 1.85–7.62)
NEUTS SEG NFR BLD AUTO: 62 % (ref 43–75)
NRBC BLD AUTO-RTO: 0 /100 WBCS
PLATELET # BLD AUTO: 304 THOUSANDS/UL (ref 149–390)
PMV BLD AUTO: 9.9 FL (ref 8.9–12.7)
POTASSIUM SERPL-SCNC: 3.8 MMOL/L (ref 3.5–5.3)
PROT SERPL-MCNC: 8.1 G/DL (ref 6.4–8.2)
RBC # BLD AUTO: 5.22 MILLION/UL (ref 3.88–5.62)
SODIUM SERPL-SCNC: 141 MMOL/L (ref 136–145)
WBC # BLD AUTO: 7.94 THOUSAND/UL (ref 4.31–10.16)

## 2022-04-22 PROCEDURE — 99285 EMERGENCY DEPT VISIT HI MDM: CPT | Performed by: EMERGENCY MEDICINE

## 2022-04-22 PROCEDURE — 71045 X-RAY EXAM CHEST 1 VIEW: CPT

## 2022-04-22 PROCEDURE — 85025 COMPLETE CBC W/AUTO DIFF WBC: CPT | Performed by: EMERGENCY MEDICINE

## 2022-04-22 PROCEDURE — 36415 COLL VENOUS BLD VENIPUNCTURE: CPT

## 2022-04-22 PROCEDURE — 84484 ASSAY OF TROPONIN QUANT: CPT | Performed by: EMERGENCY MEDICINE

## 2022-04-22 PROCEDURE — 80053 COMPREHEN METABOLIC PANEL: CPT | Performed by: EMERGENCY MEDICINE

## 2022-04-22 PROCEDURE — 99285 EMERGENCY DEPT VISIT HI MDM: CPT

## 2022-04-22 PROCEDURE — 93005 ELECTROCARDIOGRAM TRACING: CPT

## 2022-04-23 LAB
2HR DELTA HS TROPONIN: 0 NG/L
ATRIAL RATE: 93 BPM
CARDIAC TROPONIN I PNL SERPL HS: 6 NG/L
P AXIS: 56 DEGREES
PR INTERVAL: 134 MS
QRS AXIS: 66 DEGREES
QRSD INTERVAL: 82 MS
QT INTERVAL: 342 MS
QTC INTERVAL: 419 MS
T WAVE AXIS: 60 DEGREES
VENTRICULAR RATE: 90 BPM

## 2022-04-23 PROCEDURE — 93010 ELECTROCARDIOGRAM REPORT: CPT | Performed by: INTERNAL MEDICINE

## 2022-04-23 NOTE — ED PROVIDER NOTES
History  Chief Complaint   Patient presents with    Chest Pain     Pt to ED with c/o 7/10 chest pain described as a pressure, pt states "I feel like my neck and left leg are asleep," pt denies n/v     Patient is a 43-year-old male with a history of diabetes and hypertension who presents with chest pain  Patient states his chest pain started this morning and has been intermittent since onset  He describes a left-sided chest discomfort which lasts seconds to minutes  He admits to occasional radiation into his neck  He also describes numbness in his left upper and lower extremities which was constant but has resolved in the past 2 hours  He denies any current chest pain  He admits to previous episodes of chest pain, but does not have a significant cardiac history that he is aware of  He denies any associated nausea, vomiting, diaphoresis or other concerns  He denies any exacerbating or alleviating factors  History provided by:  Patient  Chest Pain  Pain location:  L chest  Pain radiates to:  Neck  Pain radiates to the back: no    Duration:  1 day  Timing:  Intermittent  Chronicity:  New  Ineffective treatments:  None tried  Associated symptoms: shortness of breath    Associated symptoms: no abdominal pain, no back pain, no cough, no diaphoresis, no dizziness, no dysphagia, no fever, no headache, no lower extremity edema, no nausea, no palpitations and not vomiting        Prior to Admission Medications   Prescriptions Last Dose Informant Patient Reported? Taking?    Blood Glucose Monitoring Suppl (ONE TOUCH ULTRA 2) w/Device KIT  Self No No   Sig: by Does not apply route 3 (three) times a day   Blood Pressure Monitoring (Blood Pressure Monitor/M Cuff) MISC  Self No No   Sig: Use 2 (two) times a day   Lancets (onetouch ultrasoft) lancets  Self No No   Sig: Use as instructed   amLODIPine (NORVASC) 10 mg tablet  Self No No   Sig: Take 1 tablet (10 mg total) by mouth daily   aspirin (ECOTRIN LOW STRENGTH) 81 mg EC tablet  Self No No   Sig: Take 1 tablet (81 mg total) by mouth daily   atorvastatin (LIPITOR) 20 mg tablet  Self No No   Sig: Take 1 tablet (20 mg total) by mouth daily   glucose blood (OneTouch Ultra) test strip  Self No No   Sig: Use as instructed   magnesium gluconate (MAGONATE) 500 mg tablet   No No   Sig: Take 1 tablet (500 mg total) by mouth daily   metFORMIN (GLUCOPHAGE-XR) 500 mg 24 hr tablet  Self No No   Sig: Take 1 tablet (500 mg total) by mouth daily with lunch   pantoprazole (PROTONIX) 40 mg tablet   No No   Sig: Take 1 tablet (40 mg total) by mouth 2 (two) times a day before meals   sertraline (ZOLOFT) 100 mg tablet   No No   Sig: Take 1 tablet (100 mg total) by mouth daily      Facility-Administered Medications: None       Past Medical History:   Diagnosis Date    Chest pain 1/12/2021    Diabetes mellitus ([facility] [address] )     Exposure to SARS-associated coronavirus 10/5/2020    Hypertension     Viral upper respiratory tract infection 10/7/2020       History reviewed  No pertinent surgical history  Family History   Problem Relation Age of Onset    Diabetes Mother     Diabetes Father      I have reviewed and agree with the history as documented  E-Cigarette/Vaping    E-Cigarette Use Never User      E-Cigarette/Vaping Substances     Social History     Tobacco Use    Smoking status: Current Every Day Smoker     Packs/day: 0 25     Years: 25 00     Pack years: 6 25     Types: Cigarettes     Start date: 2/18/1995    Smokeless tobacco: Never Used   Vaping Use    Vaping Use: Never used   Substance Use Topics    Alcohol use: Yes     Comment: 1-2 drinks daily    Drug use: Never       Review of Systems   Constitutional: Negative for chills, diaphoresis and fever  HENT: Negative for nosebleeds, sore throat and trouble swallowing  Eyes: Negative for photophobia, pain and visual disturbance  Respiratory: Positive for shortness of breath  Negative for cough and chest tightness      Cardiovascular: Positive for chest pain  Negative for palpitations and leg swelling  Gastrointestinal: Negative for abdominal pain, constipation, diarrhea, nausea and vomiting  Endocrine: Negative for polydipsia and polyuria  Genitourinary: Negative for difficulty urinating, dysuria and hematuria  Musculoskeletal: Negative for back pain, neck pain and neck stiffness  Skin: Negative for pallor and rash  Neurological: Negative for dizziness, syncope, light-headedness and headaches  All other systems reviewed and are negative  Physical Exam  Physical Exam  Vitals and nursing note reviewed  Constitutional:       General: He is not in acute distress  Appearance: He is well-developed  HENT:      Head: Normocephalic and atraumatic  Eyes:      Pupils: Pupils are equal, round, and reactive to light  Cardiovascular:      Rate and Rhythm: Normal rate and regular rhythm  Pulses: Normal pulses  Heart sounds: Normal heart sounds  Pulmonary:      Effort: Pulmonary effort is normal  No respiratory distress  Breath sounds: Normal breath sounds  Abdominal:      General: There is no distension  Palpations: Abdomen is soft  Abdomen is not rigid  Tenderness: There is no abdominal tenderness  There is no guarding or rebound  Musculoskeletal:         General: No tenderness  Normal range of motion  Cervical back: Normal range of motion and neck supple  Lymphadenopathy:      Cervical: No cervical adenopathy  Skin:     General: Skin is warm and dry  Capillary Refill: Capillary refill takes less than 2 seconds  Neurological:      Mental Status: He is alert and oriented to person, place, and time  Cranial Nerves: No cranial nerve deficit  Sensory: No sensory deficit           Vital Signs  ED Triage Vitals   Temperature Pulse Respirations Blood Pressure SpO2   04/22/22 2025 04/22/22 2025 04/22/22 2025 04/22/22 2025 04/22/22 2025   98 2 °F (36 8 °C) 93 18 151/89 98 % Temp src Heart Rate Source Patient Position - Orthostatic VS BP Location FiO2 (%)   -- 04/22/22 2300 04/22/22 2300 04/22/22 2300 --    Monitor Lying Right arm       Pain Score       04/22/22 2025       7           Vitals:    04/22/22 2025 04/22/22 2145 04/22/22 2300   BP: 151/89 131/80 134/78   Pulse: 93 70 74   Patient Position - Orthostatic VS:   Lying         Visual Acuity      ED Medications  Medications - No data to display    Diagnostic Studies  Results Reviewed     Procedure Component Value Units Date/Time    HS Troponin I 2hr [[id number]]  (Normal) Collected: 04/22/22 2258    Lab Status: Final result Specimen: Blood from Arm, Left Updated: 04/23/22 0013     hs TnI 2hr 6 ng/L      Delta 2hr hsTnI 0 ng/L     HS Troponin 0hr (reflex protocol) [[id number]]  (Normal) Collected: 04/22/22 2107    Lab Status: Final result Specimen: Blood from Arm, Left Updated: 04/22/22 2226     hs TnI 0hr 6 ng/L     Comprehensive metabolic panel [[id number]]  (Abnormal) Collected: 04/22/22 2107    Lab Status: Final result Specimen: Blood from Arm, Left Updated: 04/22/22 2150     Sodium 141 mmol/L      Potassium 3 8 mmol/L      Chloride 104 mmol/L      CO2 29 mmol/L      ANION GAP 8 mmol/L      BUN 10 mg/dL      Creatinine 1 16 mg/dL      Glucose 139 mg/dL      Calcium 9 3 mg/dL      AST 20 U/L      ALT 34 U/L      Alkaline Phosphatase 97 U/L      Total Protein 8 1 g/dL      Albumin 4 0 g/dL      Total Bilirubin 0 14 mg/dL      eGFR 75 ml/min/1 73sq m     Narrative:      Brett guidelines for Chronic Kidney Disease (CKD):     Stage 1 with normal or high GFR (GFR > 90 mL/min/1 73 square meters)    Stage 2 Mild CKD (GFR = 60-89 mL/min/1 73 square meters)    Stage 3A Moderate CKD (GFR = 45-59 mL/min/1 73 square meters)    Stage 3B Moderate CKD (GFR = 30-44 mL/min/1 73 square meters)    Stage 4 Severe CKD (GFR = 15-29 mL/min/1 73 square meters)    Stage 5 End Stage CKD (GFR <15 mL/min/1 73 square meters)  Note: GFR calculation is accurate only with a steady state creatinine    CBC and differential [[id number]]  (Abnormal) Collected: 04/22/22 2107    Lab Status: Final result Specimen: Blood from Arm, Left Updated: 04/22/22 2123     WBC 7 94 Thousand/uL      RBC 5 22 Million/uL      Hemoglobin 14 0 g/dL      Hematocrit 43 5 %      MCV 83 fL      MCH 26 8 pg      MCHC 32 2 g/dL      RDW 15 9 %      MPV 9 9 fL      Platelets 147 Thousands/uL      nRBC 0 /100 WBCs      Neutrophils Relative 62 %      Immat GRANS % 1 %      Lymphocytes Relative 25 %      Monocytes Relative 10 %      Eosinophils Relative 1 %      Basophils Relative 1 %      Neutrophils Absolute 5 01 Thousands/µL      Immature Grans Absolute 0 09 Thousand/uL      Lymphocytes Absolute 1 95 Thousands/µL      Monocytes Absolute 0 76 Thousand/µL      Eosinophils Absolute 0 08 Thousand/µL      Basophils Absolute 0 05 Thousands/µL                  XR chest 1 view portable   ED Interpretation by Colette Alcaraz DO (04/22 2226)   No infiltrates  No cardiomegaly  No widened mediastinum  Final Result by Jax Lomax MD (04/23 2074)      No acute cardiopulmonary disease  Workstation performed: [id number]                    Procedures  ECG 12 Lead Documentation Only    Date/Time: 4/22/2022 10:24 PM  Performed by: Colette Alcaraz DO  Authorized by: Colette Alcaraz DO     ECG reviewed by me, the ED Provider: yes    Patient location:  ED  Previous ECG:     Previous ECG:  Unavailable    Comparison to cardiac monitor: Yes    Comments:      Normal sinus rhythm at a rate of 90 beats per minute  Normal intervals  Normal axis  Normal QRS  Nonspecific ST T wave abnormalities  No old for comparison               ED Course             HEART Risk Score      Most Recent Value   Heart Score Risk Calculator    History 0 Filed at: 04/23/2022 0026   ECG 1 Filed at: 04/23/2022 0026   Age 0 Filed at: 04/23/2022 0026   Risk Factors 2 Filed at: 04/23/2022 0026   Troponin 0 Filed at: 04/23/2022 0026   HEART Score 3 Filed at: 04/23/2022 5899                                      [facility]  Number of Diagnoses or Management Options  Atypical chest pain: new and requires workup  Diagnosis management comments: Patient presents with intermittent chest pain today  Patient is currently without chest pain  EKG and delta troponin do not indicate acute ischemia  Do not suspect ACS, aortic dissection, pneumothorax, pericardial tamponade, pulmonary embolism, esophageal rupture as cause of chest pain  HEART score completed and patient is considered low risk for adverse cardiac event  Shared decision making used and patient prefers discharge and outpatient follow up  Patient will follow up with PCP to facilitate further workup  Advised to return to ED immediately if symptoms return  Portions of the above record have been created with voice recognition software   Occasional wrong word or "sound alike" substitutions may have occurred due to the inherent limitations of voice recognition software   Read the chart carefully and recognize, using context, where substitutions may have occurred           Amount and/or Complexity of Data Reviewed  Clinical lab tests: ordered and reviewed  Tests in the radiology section of CPT®: ordered and reviewed  Tests in the medicine section of CPT®: ordered and reviewed  Review and summarize past medical records: yes  Independent visualization of images, tracings, or specimens: yes    Risk of Complications, Morbidity, and/or Mortality  Presenting problems: high  Diagnostic procedures: moderate  Management options: moderate    Patient Progress  Patient progress: resolved      Disposition  Final diagnoses:   Atypical chest pain     Time reflects when diagnosis was documented in both MDM as applicable and the Disposition within this note     Time User Action Codes Description Comment    4/23/2022 12:26 AM Debi HALE Add [R03 89] Atypical chest pain       ED Disposition     ED Disposition Condition Date/Time Comment    Discharge Stable Sat Apr 23, 2022 12:26 AM Sary Abdalla discharge to home/self care              Follow-up Information     Follow up With Specialties Details Why Contact Info    Faraz Ramirez MD Internal Medicine Schedule an appointment as soon as possible for a visit  Return to ED sooner if symptoms worsen or persist  [address] [facility]  [phone]            Discharge Medication List as of 4/23/2022 12:28 AM      CONTINUE these medications which have NOT CHANGED    Details   amLODIPine (NORVASC) 10 mg tablet Take 1 tablet (10 mg total) by mouth daily, Starting Wed 7/21/2021, Until Tue 10/19/2021, Normal      aspirin (ECOTRIN LOW STRENGTH) 81 mg EC tablet Take 1 tablet (81 mg total) by mouth daily, Starting Tue 3/9/2021, Normal      atorvastatin (LIPITOR) 20 mg tablet Take 1 tablet (20 mg total) by mouth daily, Starting Tue 3/9/2021, Normal      Blood Glucose Monitoring Suppl (ONE TOUCH ULTRA 2) w/Device KIT by Does not apply route 3 (three) times a day, Starting Thu 9/3/2020, Normal      Blood Pressure Monitoring (Blood Pressure Monitor/M Cuff) MISC Use 2 (two) times a day, Starting Wed 3/10/2021, Normal      glucose blood (OneTouch Ultra) test strip Use as instructed, Normal      Lancets (onetouch ultrasoft) lancets Use as instructed, Normal      magnesium gluconate (MAGONATE) 500 mg tablet Take 1 tablet (500 mg total) by mouth daily, Starting Thu 10/28/2021, Normal      metFORMIN (GLUCOPHAGE-XR) 500 mg 24 hr tablet Take 1 tablet (500 mg total) by mouth daily with lunch, Starting Tue 3/9/2021, Normal      pantoprazole (PROTONIX) 40 mg tablet Take 1 tablet (40 mg total) by mouth 2 (two) times a day before meals, Starting Thu 10/28/2021, Normal      sertraline (ZOLOFT) 100 mg tablet Take 1 tablet (100 mg total) by mouth daily, Starting Thu 10/28/2021, Until Mon 12/27/2021, Normal No discharge procedures on file      PDMP Review       Value Time User    PDMP Reviewed  Yes 9/30/2021  3:08 PM Reva Thomson DO          ED Provider  Electronically Signed by           Darius Wynn DO  04/23/22 1023

## 2022-04-29 ENCOUNTER — TELEPHONE (OUTPATIENT)
Dept: INTERNAL MEDICINE CLINIC | Facility: CLINIC | Age: 47
End: 2022-04-29

## 2022-04-29 NOTE — TELEPHONE ENCOUNTER
Binu welch, please see if Charity Angelo can come for a Er f/u, thanks!          Chest pain,    I have called [facility] twice now to make appt

## 2022-05-14 DIAGNOSIS — R07.9 CHEST PAIN, UNSPECIFIED TYPE: ICD-10-CM

## 2022-05-16 RX ORDER — PANTOPRAZOLE SODIUM 40 MG/1
TABLET, DELAYED RELEASE ORAL
Qty: 180 TABLET | Refills: 1 | Status: SHIPPED | OUTPATIENT
Start: 2022-05-16

## 2022-05-16 NOTE — TELEPHONE ENCOUNTER
Kim Isbell has requested a refill of pantoprazole (PROTONIX) 40 mg tablet  Pt meets the requirements placed by [facility]  Will refill medication

## 2022-06-06 ENCOUNTER — HOSPITAL ENCOUNTER (EMERGENCY)
Facility: HOSPITAL | Age: 47
Discharge: HOME | End: 2022-06-06
Attending: EMERGENCY MEDICINE | Admitting: EMERGENCY MEDICINE

## 2022-06-06 ENCOUNTER — APPOINTMENT (EMERGENCY)
Dept: RADIOLOGY | Facility: HOSPITAL | Age: 47
End: 2022-06-06

## 2022-06-06 VITALS
BODY MASS INDEX: 25.55 KG/M2 | DIASTOLIC BLOOD PRESSURE: 87 MMHG | RESPIRATION RATE: 18 BRPM | TEMPERATURE: 98 F | WEIGHT: 183.2 LBS | HEART RATE: 65 BPM | SYSTOLIC BLOOD PRESSURE: 140 MMHG | OXYGEN SATURATION: 96 %

## 2022-06-06 DIAGNOSIS — R07.9 CHEST PAIN: Primary | ICD-10-CM

## 2022-06-06 LAB
2HR DELTA HS TROPONIN: 0 NG/L
ALBUMIN SERPL BCP-MCNC: 3.9 G/DL (ref 3.5–5)
ALP SERPL-CCNC: 94 U/L (ref 34–104)
ALT SERPL W P-5'-P-CCNC: 36 U/L (ref 7–52)
ANION GAP SERPL CALCULATED.3IONS-SCNC: 8 MMOL/L (ref 4–13)
AST SERPL W P-5'-P-CCNC: 36 U/L (ref 13–39)
ATRIAL RATE: 65 BPM
BASOPHILS # BLD AUTO: 0.06 THOUSANDS/ΜL (ref 0–0.1)
BASOPHILS NFR BLD AUTO: 1 % (ref 0–1)
BILIRUB SERPL-MCNC: 0.43 MG/DL (ref 0.2–1)
BUN SERPL-MCNC: 8 MG/DL (ref 5–25)
CALCIUM SERPL-MCNC: 8.9 MG/DL (ref 8.4–10.2)
CARDIAC TROPONIN I PNL SERPL HS: 2 NG/L
CARDIAC TROPONIN I PNL SERPL HS: 2 NG/L
CHLORIDE SERPL-SCNC: 104 MMOL/L (ref 96–108)
CO2 SERPL-SCNC: 27 MMOL/L (ref 21–32)
CREAT SERPL-MCNC: 0.8 MG/DL (ref 0.6–1.3)
D DIMER PPP FEU-MCNC: <0.27 UG/ML FEU
EOSINOPHIL # BLD AUTO: 0.08 THOUSAND/ΜL (ref 0–0.61)
EOSINOPHIL NFR BLD AUTO: 1 % (ref 0–6)
ERYTHROCYTE [DISTWIDTH] IN BLOOD BY AUTOMATED COUNT: 16.3 % (ref 11.6–15.1)
GFR SERPL CREATININE-BSD FRML MDRD: 107 ML/MIN/1.73SQ M
GLUCOSE SERPL-MCNC: 117 MG/DL (ref 65–140)
HCT VFR BLD AUTO: 40.5 % (ref 36.5–49.3)
HGB BLD-MCNC: 13 G/DL (ref 12–17)
IMM GRANULOCYTES # BLD AUTO: 0.03 THOUSAND/UL (ref 0–0.2)
IMM GRANULOCYTES NFR BLD AUTO: 0 % (ref 0–2)
LYMPHOCYTES # BLD AUTO: 2.22 THOUSANDS/ΜL (ref 0.6–4.47)
LYMPHOCYTES NFR BLD AUTO: 28 % (ref 14–44)
MCH RBC QN AUTO: 27 PG (ref 26.8–34.3)
MCHC RBC AUTO-ENTMCNC: 32.1 G/DL (ref 31.4–37.4)
MCV RBC AUTO: 84 FL (ref 82–98)
MONOCYTES # BLD AUTO: 0.79 THOUSAND/ΜL (ref 0.17–1.22)
MONOCYTES NFR BLD AUTO: 10 % (ref 4–12)
NEUTROPHILS # BLD AUTO: 4.86 THOUSANDS/ΜL (ref 1.85–7.62)
NEUTS SEG NFR BLD AUTO: 60 % (ref 43–75)
NRBC BLD AUTO-RTO: 0 /100 WBCS
P AXIS: 35 DEGREES
PLATELET # BLD AUTO: 254 THOUSANDS/UL (ref 149–390)
PMV BLD AUTO: 10.3 FL (ref 8.9–12.7)
POTASSIUM SERPL-SCNC: 3.5 MMOL/L (ref 3.5–5.3)
PR INTERVAL: 160 MS
PROT SERPL-MCNC: 7 G/DL (ref 6.4–8.4)
QRS AXIS: 65 DEGREES
QRSD INTERVAL: 88 MS
QT INTERVAL: 410 MS
QTC INTERVAL: 426 MS
RBC # BLD AUTO: 4.81 MILLION/UL (ref 3.88–5.62)
SODIUM SERPL-SCNC: 139 MMOL/L (ref 135–147)
T WAVE AXIS: 15 DEGREES
VENTRICULAR RATE: 65 BPM
WBC # BLD AUTO: 8.04 THOUSAND/UL (ref 4.31–10.16)

## 2022-06-06 PROCEDURE — 93010 ELECTROCARDIOGRAM REPORT: CPT | Performed by: INTERNAL MEDICINE

## 2022-06-06 PROCEDURE — 99284 EMERGENCY DEPT VISIT MOD MDM: CPT | Performed by: EMERGENCY MEDICINE

## 2022-06-06 PROCEDURE — 36415 COLL VENOUS BLD VENIPUNCTURE: CPT | Performed by: EMERGENCY MEDICINE

## 2022-06-06 PROCEDURE — 85379 FIBRIN DEGRADATION QUANT: CPT | Performed by: EMERGENCY MEDICINE

## 2022-06-06 PROCEDURE — 71045 X-RAY EXAM CHEST 1 VIEW: CPT

## 2022-06-06 PROCEDURE — 84484 ASSAY OF TROPONIN QUANT: CPT | Performed by: EMERGENCY MEDICINE

## 2022-06-06 PROCEDURE — 99285 EMERGENCY DEPT VISIT HI MDM: CPT

## 2022-06-06 PROCEDURE — 80053 COMPREHEN METABOLIC PANEL: CPT | Performed by: EMERGENCY MEDICINE

## 2022-06-06 PROCEDURE — 85025 COMPLETE CBC W/AUTO DIFF WBC: CPT | Performed by: EMERGENCY MEDICINE

## 2022-06-06 PROCEDURE — 93005 ELECTROCARDIOGRAM TRACING: CPT

## 2022-06-07 NOTE — ED PROVIDER NOTES
History  Chief Complaint   Patient presents with    Chest Pain     Patient arrives via EMS with complaints of chest pain that started a few hours ago and numbness and tingling in both legs  Per patient he was relaxing when the chest pain started  Pt also states he was short of breath earlier this evening, but not at this time  EMS gave 324 of aspirin and 1 nitro prior to arrival     HPI     Patient is a pleasant 78-year-old male that presents to the emergency department with chest pain  He notes it started while he was at rest     No radiation to the back  No tearing pain going to the back  Normal bilat UE pulses  No pleuritic pain  No history of PE  No new unilateral leg swelling  Non exertional  No sweats  No right sided pain  No vomiting  No fever or cough to suggest pneumonia  No  vomiting or subcue crepitus  Equal breath sounds  No skin rash  No syncope  No new murmur to suggest symptomatic valvular stenosis/ regurg or ruptured chordae  Vitals do not suggest tamponade  No palpable crepitus on exam    No recent viral syndromes to suggest Elvira/Myocarditis  Patient notes the pain was moderate and similar to prior episodes in the past      Currently feeling well  MDM chest pain, delta trop reassuring, will dc  Discussed return precautions  REVIEW OF SYSTEMS  Positive for chest pain  All other systems reviewed and are negative unless noted in this section or otherwise in the chart  Physical Exam  Vitals and nursing note reviewed  Constitutional:    Appearance:  Patient is well-developed  No diaphoresis  HENT:   Head: Normocephalic and atraumatic  Right Ear: External ear normal    Left Ear: External ear normal    Nose: No congestion  Eyes:   Conjunctiva/sclera: Conjunctivae normal    Right eye: No discharge  Left eye: No discharge  Extraocular Movements: Extraocular movements intact  Neck:   Vascular: No JVD  Trachea: No tracheal deviation  Cardiovascular:   Rate and Rhythm: Normal rate and regular rhythm  Heart sounds: Normal heart sounds  Pulmonary:   Effort: Pulmonary effort is normal  No respiratory distress  Breath sounds: No wheezing or rales  Abdominal:   Palpations: Abdomen is soft  Tenderness: There is no abdominal tenderness  There is no guarding or rebound  Musculoskeletal:      General: No tenderness  Cervical back: Normal range of motion and neck supple  Skin:  General: Skin is warm and dry  Findings: No erythema or rash  Neurological:   General: No focal deficit present  Mental Status: Alert and oriented to person, place, and time  Motor: No weakness  Psychiatric:      Behavior: Behavior normal       Thought Content: Thought content normal                            Prior to Admission Medications   Prescriptions Last Dose Informant Patient Reported? Taking?    Blood Glucose Monitoring Suppl (ONE TOUCH ULTRA 2) w/Device KIT  Self No No   Sig: by Does not apply route 3 (three) times a day   Blood Pressure Monitoring (Blood Pressure Monitor/M Cuff) MISC  Self No No   Sig: Use 2 (two) times a day   Lancets (onetouch ultrasoft) lancets  Self No No   Sig: Use as instructed   amLODIPine (NORVASC) 10 mg tablet  Self No No   Sig: Take 1 tablet (10 mg total) by mouth daily   aspirin (ECOTRIN LOW STRENGTH) 81 mg EC tablet  Self No No   Sig: Take 1 tablet (81 mg total) by mouth daily   atorvastatin (LIPITOR) 20 mg tablet  Self No No   Sig: Take 1 tablet (20 mg total) by mouth daily   glucose blood (OneTouch Ultra) test strip  Self No No   Sig: Use as instructed   magnesium gluconate (MAGONATE) 500 mg tablet   No No   Sig: Take 1 tablet (500 mg total) by mouth daily   metFORMIN (GLUCOPHAGE-XR) 500 mg 24 hr tablet  Self No No   Sig: Take 1 tablet (500 mg total) by mouth daily with lunch   pantoprazole (PROTONIX) 40 mg tablet   No No   Sig: TAKE 1 TABLET(40 MG) BY MOUTH TWICE DAILY BEFORE MEALS   sertraline (ZOLOFT) 100 mg tablet   No No   Sig: Take 1 tablet (100 mg total) by mouth daily      Facility-Administered Medications: None       Past Medical History:   Diagnosis Date    Chest pain 1/12/2021    Diabetes mellitus ([facility] [address] )     Exposure to SARS-associated coronavirus 10/5/2020    Hypertension     Viral upper respiratory tract infection 10/7/2020       History reviewed  No pertinent surgical history  Family History   Problem Relation Age of Onset    Diabetes Mother     Diabetes Father      I have reviewed and agree with the history as documented      E-Cigarette/Vaping    E-Cigarette Use Never User      E-Cigarette/Vaping Substances     Social History     Tobacco Use    Smoking status: Current Every Day Smoker     Packs/day: 1 00     Years: 25 00     Pack years: 25 00     Types: Cigarettes     Start date: 2/18/1995    Smokeless tobacco: Never Used   Vaping Use    Vaping Use: Never used   Substance Use Topics    Alcohol use: Yes     Comment: 1-2 drinks daily    Drug use: Never       Review of Systems    Physical Exam  Physical Exam    Vital Signs  ED Triage Vitals [06/06/22 0258]   Temperature Pulse Respirations Blood Pressure SpO2   98 °F (36 7 °C) 65 18 140/87 96 %      Temp Source Heart Rate Source Patient Position - Orthostatic VS BP Location FiO2 (%)   Oral Monitor Lying Right arm --      Pain Score       7           Vitals:    06/06/22 0258   BP: 140/87   Pulse: 65   Patient Position - Orthostatic VS: Lying         Visual Acuity  Visual Acuity    Flowsheet Row Most Recent Value   L Pupil Size (mm) 3   R Pupil Size (mm) 3          ED Medications  Medications - No data to display    Diagnostic Studies  Results Reviewed     Procedure Component Value Units Date/Time    HS Troponin I 2hr [[id number]]  (Normal) Collected: 06/06/22 0541    Lab Status: Final result Specimen: Blood from Arm, Left Updated: 06/06/22 0640     hs TnI 2hr 2 ng/L      Delta 2hr hsTnI 0 ng/L     D-dimer, quantitative [[id number]]  (Normal) Collected: 06/06/22 0443    Lab Status: Final result Specimen: Blood Updated: 06/06/22 0535     D-Dimer, Quant <0 27 ug/ml FEU     HS Troponin 0hr (reflex protocol) [[id number]]  (Normal) Collected: 06/06/22 0347    Lab Status: Final result Specimen: Blood from Arm, Left Updated: 06/06/22 0435     hs TnI 0hr 2 ng/L     Comprehensive metabolic panel [[id number]] Collected: 06/06/22 0347    Lab Status: Final result Specimen: Blood from Arm, Left Updated: 06/06/22 0426     Sodium 139 mmol/L      Potassium 3 5 mmol/L      Chloride 104 mmol/L      CO2 27 mmol/L      ANION GAP 8 mmol/L      BUN 8 mg/dL      Creatinine 0 80 mg/dL      Glucose 117 mg/dL      Calcium 8 9 mg/dL      AST 36 U/L      ALT 36 U/L      Alkaline Phosphatase 94 U/L      Total Protein 7 0 g/dL      Albumin 3 9 g/dL      Total Bilirubin 0 43 mg/dL      eGFR 107 ml/min/1 73sq m     Narrative:      [place] guidelines for Chronic Kidney Disease (CKD):     Stage 1 with normal or high GFR (GFR > 90 mL/min/1 73 square meters)    Stage 2 Mild CKD (GFR = 60-89 mL/min/1 73 square meters)    Stage 3A Moderate CKD (GFR = 45-59 mL/min/1 73 square meters)    Stage 3B Moderate CKD (GFR = 30-44 mL/min/1 73 square meters)    Stage 4 Severe CKD (GFR = 15-29 mL/min/1 73 square meters)    Stage 5 End Stage CKD (GFR <15 mL/min/1 73 square meters)  Note: GFR calculation is accurate only with a steady state creatinine    CBC and differential [[id number]]  (Abnormal) Collected: 06/06/22 0347    Lab Status: Final result Specimen: Blood from Arm, Left Updated: 06/06/22 0412     WBC 8 04 Thousand/uL      RBC 4 81 Million/uL      Hemoglobin 13 0 g/dL      Hematocrit 40 5 %      MCV 84 fL      MCH 27 0 pg      MCHC 32 1 g/dL      RDW 16 3 %      MPV 10 3 fL      Platelets 837 Thousands/uL      nRBC 0 /100 WBCs      Neutrophils Relative 60 %      Immat GRANS % 0 %      Lymphocytes Relative 28 %      Monocytes Relative 10 %      Eosinophils Relative 1 %      Basophils Relative 1 %      Neutrophils Absolute 4 86 Thousands/µL      Immature Grans Absolute 0 03 Thousand/uL      Lymphocytes Absolute 2 22 Thousands/µL      Monocytes Absolute 0 79 Thousand/µL      Eosinophils Absolute 0 08 Thousand/µL      Basophils Absolute 0 06 Thousands/µL                  XR chest 1 view portable   Final Result by Lennie Luis MD (06/06 6342)      No acute cardiopulmonary disease  Workstation performed: [id number]                    Procedures  Procedures         ED Course                                             MDM    Disposition  Final diagnoses:   Chest pain     Time reflects when diagnosis was documented in both MDM as applicable and the Disposition within this note     Time User Action Codes Description Comment    6/6/2022  7:05 AM Nonda Sis T Add [R07 9] Chest pain       ED Disposition     ED Disposition   Discharge    Condition   Stable    Date/Time   Mon Jun 6, 2022  7:00 AM    Comment   Hugo Skelton discharge to home/self care                 Follow-up Information     Follow up With Specialties Details Why Contact Info    Dann Holt MD Internal Medicine In 1 day  [address] [phone]            Discharge Medication List as of 6/6/2022  7:06 AM      CONTINUE these medications which have NOT CHANGED    Details   amLODIPine (NORVASC) 10 mg tablet Take 1 tablet (10 mg total) by mouth daily, Starting Wed 7/21/2021, Until Tue 10/19/2021, Normal      aspirin (ECOTRIN LOW STRENGTH) 81 mg EC tablet Take 1 tablet (81 mg total) by mouth daily, Starting Tue 3/9/2021, Normal      atorvastatin (LIPITOR) 20 mg tablet Take 1 tablet (20 mg total) by mouth daily, Starting Tue 3/9/2021, Normal      Blood Glucose Monitoring Suppl (ONE TOUCH ULTRA 2) w/Device KIT by Does not apply route 3 (three) times a day, Starting Thu 9/3/2020, Normal      Blood Pressure Monitoring (Blood Pressure Monitor/M Cuff) MISC Use 2 (two) times a day, Starting Wed 3/10/2021, Normal      glucose blood (OneTouch Ultra) test strip Use as instructed, Normal      Lancets (onetouch ultrasoft) lancets Use as instructed, Normal      magnesium gluconate (MAGONATE) 500 mg tablet Take 1 tablet (500 mg total) by mouth daily, Starting Thu 10/28/2021, Normal      metFORMIN (GLUCOPHAGE-XR) 500 mg 24 hr tablet Take 1 tablet (500 mg total) by mouth daily with lunch, Starting Tue 3/9/2021, Normal      pantoprazole (PROTONIX) 40 mg tablet TAKE 1 TABLET(40 MG) BY MOUTH TWICE DAILY BEFORE MEALS, Normal      sertraline (ZOLOFT) 100 mg tablet Take 1 tablet (100 mg total) by mouth daily, Starting Thu 10/28/2021, Until Mon 12/27/2021, Normal             No discharge procedures on file      PDMP Review       Value Time User    PDMP Reviewed  Yes 9/30/2021  3:08 PM Sheila Whitmore DO          ED Provider  Electronically Signed by           Malcolm Frausto MD  06/07/22 8226

## 2022-06-08 ENCOUNTER — HOSPITAL ENCOUNTER (EMERGENCY)
Facility: HOSPITAL | Age: 47
Discharge: HOME | End: 2022-06-08
Attending: EMERGENCY MEDICINE
Payer: COMMERCIAL

## 2022-06-08 ENCOUNTER — APPOINTMENT (EMERGENCY)
Dept: RADIOLOGY | Facility: HOSPITAL | Age: 47
End: 2022-06-08
Payer: COMMERCIAL

## 2022-06-08 VITALS
DIASTOLIC BLOOD PRESSURE: 87 MMHG | TEMPERATURE: 97.6 F | OXYGEN SATURATION: 99 % | HEART RATE: 85 BPM | SYSTOLIC BLOOD PRESSURE: 160 MMHG | RESPIRATION RATE: 16 BRPM

## 2022-06-08 DIAGNOSIS — S92.412A CLOSED DISPLACED FRACTURE OF PROXIMAL PHALANX OF LEFT GREAT TOE, INITIAL ENCOUNTER: Primary | ICD-10-CM

## 2022-06-08 DIAGNOSIS — M25.562 ACUTE PAIN OF LEFT KNEE: ICD-10-CM

## 2022-06-08 PROCEDURE — 99284 EMERGENCY DEPT VISIT MOD MDM: CPT | Performed by: PHYSICIAN ASSISTANT

## 2022-06-08 PROCEDURE — 99283 EMERGENCY DEPT VISIT LOW MDM: CPT

## 2022-06-08 PROCEDURE — 73610 X-RAY EXAM OF ANKLE: CPT

## 2022-06-08 PROCEDURE — 96372 THER/PROPH/DIAG INJ SC/IM: CPT

## 2022-06-08 PROCEDURE — 73630 X-RAY EXAM OF FOOT: CPT

## 2022-06-08 PROCEDURE — 73564 X-RAY EXAM KNEE 4 OR MORE: CPT

## 2022-06-08 RX ORDER — ALPRAZOLAM 0.25 MG/1
0.5 TABLET ORAL
Qty: 20 TABLET | Refills: 0 | Status: SHIPPED | OUTPATIENT
Start: 2022-06-08 | End: 2022-07-20

## 2022-06-08 RX ORDER — ALPRAZOLAM 0.5 MG/1
0.5 TABLET ORAL ONCE
Status: COMPLETED | OUTPATIENT
Start: 2022-06-08 | End: 2022-06-08

## 2022-06-08 RX ORDER — KETOROLAC TROMETHAMINE 30 MG/ML
15 INJECTION, SOLUTION INTRAMUSCULAR; INTRAVENOUS ONCE
Status: COMPLETED | OUTPATIENT
Start: 2022-06-08 | End: 2022-06-08

## 2022-06-08 RX ORDER — KETOROLAC TROMETHAMINE 10 MG/1
10 TABLET, FILM COATED ORAL EVERY 6 HOURS PRN
Qty: 20 TABLET | Refills: 0 | Status: SHIPPED | OUTPATIENT
Start: 2022-06-08 | End: 2022-06-11

## 2022-06-08 RX ADMIN — KETOROLAC TROMETHAMINE 15 MG: 30 INJECTION, SOLUTION INTRAMUSCULAR at 14:31

## 2022-06-08 RX ADMIN — ALPRAZOLAM 0.5 MG: 0.5 TABLET ORAL at 15:14

## 2022-06-08 NOTE — Clinical Note
Julisa Nunez was seen and treated in our emergency department on 6/8/2022  ?    ?    Standing as tolerated until cleared by orthopedics    Diagnosis: Fracture - Trauma    Tawny Cayla  ? Sofia [place] He may return on this date: 06/09/2022    ? If you have any questions or concerns, please don't hesitate to call        Parisa Chowdhury PA-C    ______________________________           _______________          _______________  Hospital Representative                              Date                                Time

## 2022-06-08 NOTE — ED PROVIDER NOTES
History  Chief Complaint   Patient presents with    Foot Injury     States fell today going down about 10 steps  Did not hit head  C/o L knee foot and ankle pain     Patient is a 44-year-old male with past medical history significant for opiate use disorder maintained on Suboxone, diabetes and hypertension who presents today for evaluation of left foot, ankle and knee pain  Patient states that he was caring his daughter, and going down the stairs approximately 530 this morning when he slipped on a piece of paper on the stairs and fell down the stairs  He states he slid down approximately 10 steps with his foot trapped behind him under his buttocks  Since then he has had difficulty ambulating and severe pain on the medial aspect of his left knee, and great toe pain  He specifically denies head strike or loss of consciousness  History provided by:  Patient  Leg Pain  Location:  Leg  Time since incident: 5:30 a m  Injury: yes    Mechanism of injury: fall    Leg location:  L leg  Pain details:     Quality:  Pressure, throbbing, shooting and sharp    Radiates to:  Does not radiate    Severity:  Severe    Onset quality:  Sudden    Duration:  5 hours    Timing:  Constant    Progression:  Worsening  Chronicity:  New  Dislocation: no    Foreign body present:  No foreign bodies  Prior injury to area:  No  Relieved by:  None tried  Worsened by:  Bearing weight, extension and activity  Ineffective treatments:  None tried  Associated symptoms: decreased ROM, stiffness and swelling    Associated symptoms: no back pain, no fever and no muscle weakness    Swelling:     Location: Left toe and foot      Onset quality:  Sudden    Duration:  5 hours    Timing:  Constant    Progression:  Worsening    Chronicity:  New  Risk factors: no concern for non-accidental trauma, no frequent fractures, no known bone disorder, no obesity and no recent illness        Prior to Admission Medications   Prescriptions Last Dose Informant Patient Reported? Taking? Blood Glucose Monitoring Suppl (ONE TOUCH ULTRA 2) w/Device KIT  Self No No   Sig: by Does not apply route 3 (three) times a day   Blood Pressure Monitoring (Blood Pressure Monitor/M Cuff) MISC  Self No No   Sig: Use 2 (two) times a day   Lancets (onetouch ultrasoft) lancets  Self No No   Sig: Use as instructed   amLODIPine (NORVASC) 10 mg tablet  Self No No   Sig: Take 1 tablet (10 mg total) by mouth daily   aspirin (ECOTRIN LOW STRENGTH) 81 mg EC tablet  Self No No   Sig: Take 1 tablet (81 mg total) by mouth daily   atorvastatin (LIPITOR) 20 mg tablet  Self No No   Sig: Take 1 tablet (20 mg total) by mouth daily   glucose blood (OneTouch Ultra) test strip  Self No No   Sig: Use as instructed   magnesium gluconate (MAGONATE) 500 mg tablet   No No   Sig: Take 1 tablet (500 mg total) by mouth daily   metFORMIN (GLUCOPHAGE-XR) 500 mg 24 hr tablet  Self No No   Sig: Take 1 tablet (500 mg total) by mouth daily with lunch   pantoprazole (PROTONIX) 40 mg tablet   No No   Sig: TAKE 1 TABLET(40 MG) BY MOUTH TWICE DAILY BEFORE MEALS   sertraline (ZOLOFT) 100 mg tablet   No No   Sig: Take 1 tablet (100 mg total) by mouth daily      Facility-Administered Medications: None       Past Medical History:   Diagnosis Date    Chest pain 1/12/2021    Diabetes mellitus ([facility] [address] )     Exposure to SARS-associated coronavirus 10/5/2020    Hypertension     Viral upper respiratory tract infection 10/7/2020       History reviewed  No pertinent surgical history  Family History   Problem Relation Age of Onset    Diabetes Mother     Diabetes Father      I have reviewed and agree with the history as documented      E-Cigarette/Vaping    E-Cigarette Use Never User      E-Cigarette/Vaping Substances     Social History     Tobacco Use    Smoking status: Current Every Day Smoker     Packs/day: 1 00     Years: 25 00     Pack years: 25 00     Types: Cigarettes     Start date: 2/18/1995    Smokeless tobacco: Never Used   Vaping Use    Vaping Use: Never used   Substance Use Topics    Alcohol use: Yes     Comment: 1-2 drinks daily    Drug use: Never       Review of Systems   Constitutional: Negative for chills and fever  HENT: Negative for ear pain and sore throat  Eyes: Negative for pain and visual disturbance  Respiratory: Negative for cough and shortness of breath  Cardiovascular: Negative for chest pain and palpitations  Gastrointestinal: Negative for abdominal pain and vomiting  Endocrine: Negative  Genitourinary: Negative for dysuria and hematuria  Musculoskeletal: Positive for gait problem and stiffness  Negative for arthralgias and back pain  Ankle, knee, foot pain on left   Skin: Negative for color change and rash  Allergic/Immunologic: Negative  Neurological: Negative for seizures and syncope  Hematological: Negative  Psychiatric/Behavioral: Negative  All other systems reviewed and are negative  Physical Exam  Physical Exam  Vitals and nursing note reviewed  Constitutional:       General: He is not in acute distress  Appearance: Normal appearance  He is well-developed  He is not ill-appearing or toxic-appearing  HENT:      Head: Normocephalic and atraumatic  Nose: Nose normal       Mouth/Throat:      Mouth: Mucous membranes are moist    Eyes:      General: No scleral icterus  Conjunctiva/sclera: Conjunctivae normal    Cardiovascular:      Rate and Rhythm: Normal rate and regular rhythm  Pulses: Normal pulses  Heart sounds: No murmur heard  Pulmonary:      Effort: Pulmonary effort is normal  No respiratory distress  Breath sounds: Normal breath sounds  Abdominal:      Palpations: Abdomen is soft  Tenderness: There is no abdominal tenderness  Musculoskeletal:      Cervical back: Normal range of motion and neck supple  No rigidity  Right knee: Normal       Left knee: Ecchymosis present  No swelling or deformity   Normal range of motion  Tenderness present over the medial joint line  Instability Tests: Anterior drawer test: unable to tolerate instability testing 2/2 pain  Right ankle: Normal       Left ankle: Tenderness present over the ATF ligament  Left Achilles Tendon: No tenderness or defects  Vitale's test negative  Right foot: Normal       Left foot: Decreased range of motion  Normal capillary refill  Swelling, deformity, tenderness and bony tenderness present  No laceration  Normal pulse  Legs:         Feet:    Skin:     General: Skin is warm and dry  Neurological:      Mental Status: He is alert  Vital Signs  ED Triage Vitals [06/08/22 1209]   Temperature Pulse Respirations Blood Pressure SpO2   97 6 °F (36 4 °C) 85 16 160/87 99 %      Temp src Heart Rate Source Patient Position - Orthostatic VS BP Location FiO2 (%)   -- Monitor Sitting Right arm --      Pain Score       10 - Worst Possible Pain           Vitals:    06/08/22 1209   BP: 160/87   Pulse: 85   Patient Position - Orthostatic VS: Sitting         Visual Acuity      ED Medications  Medications   ketorolac (TORADOL) injection 15 mg (15 mg Intramuscular Given 6/8/22 1431)   ALPRAZolam (XANAX) tablet 0 5 mg (0 5 mg Oral Given 6/8/22 1514)       Diagnostic Studies  Results Reviewed     None                 XR knee 4+ vw left injury   Final Result by Edward Weir MD (06/08 1415)      No acute osseous abnormality  Workstation performed: [id number]         XR ankle 3+ views LEFT   Final Result by Edward Weir MD (06/08 1414)      1st proximal phalanx fracture      The study was marked in EPIC for immediate notification  Workstation performed: [id number]         XR foot 3+ views LEFT   Final Result by Edward Weir MD (06/08 1414)      1st proximal phalanx fracture      The study was marked in EPIC for immediate notification        Workstation performed: [id number]                    Procedures  Procedures ED Course                               SBIRT 22yo+    Flowsheet Row Most Recent Value   SBIRT (23 yo +)    In order to provide better care to our patients, we are screening all of our patients for alcohol and drug use  Would it be okay to ask you these screening questions? No Filed at: 06/08/2022 1328                    [facility]  Number of Diagnoses or Management Options  Acute pain of left knee: new and requires workup  Closed displaced fracture of proximal phalanx of left great toe, initial encounter: new and requires workup  Diagnosis management comments: Fracture of great toe  Pt placed in ortho shoe and provided crutches  Prn ketorolac at home  Pt on subtex - no opiates - pt states he has had benefit with xanax in the past - d/c with oral xanax    Pain of left knee  Possibility of ligamentous injury - xray without acute abnormalities  Pt placed in immobilizer and provided with crutches      Pt instructed to f/u with Dr Ruma Virk for both injuries         Amount and/or Complexity of Data Reviewed  Clinical lab tests: reviewed  Tests in the radiology section of CPT®: ordered and reviewed  Tests in the medicine section of CPT®: reviewed  Decide to obtain previous medical records or to obtain history from someone other than the patient: yes  Review and summarize past medical records: yes  Independent visualization of images, tracings, or specimens: yes    Risk of Complications, Morbidity, and/or Mortality  Presenting problems: moderate  Diagnostic procedures: moderate  Management options: moderate    Patient Progress  Patient progress: stable      Disposition  Final diagnoses:   Closed displaced fracture of proximal phalanx of left great toe, initial encounter   Acute pain of left knee     Time reflects when diagnosis was documented in both MDM as applicable and the Disposition within this note     Time User Action Codes Description Comment    6/8/2022  2:24 PM Bruce Crocker Add [X13 016F] Closed displaced fracture of proximal phalanx of left great toe, initial encounter     6/8/2022  2:24 PM Nikolay Alarcon Add [M25 [id number]] Acute pain of left knee       ED Disposition     ED Disposition   Discharge    Condition   Stable    Date/Time   Wed Jun 8, 2022  2:53 PM    [address] discharge to home/self care                 Follow-up Information     Follow up With Specialties Details Why Contact Elsa Aguirre MD Internal Medicine Call  As needed [address]   [address]      Effie Rader MD Orthopedic Surgery Schedule an appointment as soon as possible for a visit   [address] [id number]  [phone]            Discharge Medication List as of 6/8/2022  3:18 PM      START taking these medications    Details   ALPRAZolam (XANAX) 0 25 mg tablet Take 2 tablets (0 5 mg total) by mouth daily at bedtime as needed for anxiety for up to 3 days, Starting Wed 6/8/2022, Until Sat 6/11/2022 at 2359, Normal      ketorolac (TORADOL) 10 mg tablet Take 1 tablet (10 mg total) by mouth every 6 (six) hours as needed for moderate pain for up to 3 days, Starting Wed 6/8/2022, Until Sat 6/11/2022 at 2359, Normal         CONTINUE these medications which have NOT CHANGED    Details   amLODIPine (NORVASC) 10 mg tablet Take 1 tablet (10 mg total) by mouth daily, Starting Wed 7/21/2021, Until Tue 10/19/2021, Normal      aspirin (ECOTRIN LOW STRENGTH) 81 mg EC tablet Take 1 tablet (81 mg total) by mouth daily, Starting Tue 3/9/2021, Normal      atorvastatin (LIPITOR) 20 mg tablet Take 1 tablet (20 mg total) by mouth daily, Starting Tue 3/9/2021, Normal      Blood Glucose Monitoring Suppl (ONE TOUCH ULTRA 2) w/Device KIT by Does not apply route 3 (three) times a day, Starting Thu 9/3/2020, Normal      Blood Pressure Monitoring (Blood Pressure Monitor/M Cuff) MISC Use 2 (two) times a day, Starting Wed 3/10/2021, Normal      glucose blood (OneTouch Ultra) test strip Use as instructed, Normal      Lancets (onetouch ultrasoft) lancets Use as instructed, Normal      magnesium gluconate (MAGONATE) 500 mg tablet Take 1 tablet (500 mg total) by mouth daily, Starting Thu 10/28/2021, Normal      metFORMIN (GLUCOPHAGE-XR) 500 mg 24 hr tablet Take 1 tablet (500 mg total) by mouth daily with lunch, Starting Tue 3/9/2021, Normal      pantoprazole (PROTONIX) 40 mg tablet TAKE 1 TABLET(40 MG) BY MOUTH TWICE DAILY BEFORE MEALS, Normal      sertraline (ZOLOFT) 100 mg tablet Take 1 tablet (100 mg total) by mouth daily, Starting Thu 10/28/2021, Until Mon 12/27/2021, Normal             Outpatient Discharge Orders   Crutches       PDMP Review       Value Time User    PDMP Reviewed  Yes 9/30/2021  3:08 PM Haile Sepulveda DO          ED Provider  Electronically Signed by           Adalid Freitas PA-C  06/08/22 2143

## 2022-06-13 ENCOUNTER — HOSPITAL ENCOUNTER (EMERGENCY)
Facility: HOSPITAL | Age: 47
Discharge: HOME | End: 2022-06-13
Attending: EMERGENCY MEDICINE

## 2022-06-13 ENCOUNTER — APPOINTMENT (EMERGENCY)
Dept: RADIOLOGY | Facility: HOSPITAL | Age: 47
End: 2022-06-13

## 2022-06-13 VITALS
HEART RATE: 62 BPM | TEMPERATURE: 98 F | OXYGEN SATURATION: 96 % | SYSTOLIC BLOOD PRESSURE: 159 MMHG | DIASTOLIC BLOOD PRESSURE: 97 MMHG | RESPIRATION RATE: 18 BRPM

## 2022-06-13 DIAGNOSIS — E88.81 METABOLIC SYNDROME: ICD-10-CM

## 2022-06-13 DIAGNOSIS — R07.9 CHEST PAIN, UNSPECIFIED TYPE: Primary | ICD-10-CM

## 2022-06-13 DIAGNOSIS — I10 HYPERTENSION, UNSPECIFIED TYPE: ICD-10-CM

## 2022-06-13 DIAGNOSIS — E11.9 TYPE 2 DIABETES MELLITUS WITHOUT COMPLICATION, WITHOUT LONG-TERM CURRENT USE OF INSULIN (HCC): ICD-10-CM

## 2022-06-13 LAB
ALBUMIN SERPL BCP-MCNC: 4.3 G/DL (ref 3.5–5)
ALP SERPL-CCNC: 94 U/L (ref 34–104)
ALT SERPL W P-5'-P-CCNC: 28 U/L (ref 7–52)
ANION GAP SERPL CALCULATED.3IONS-SCNC: 8 MMOL/L (ref 4–13)
AST SERPL W P-5'-P-CCNC: 20 U/L (ref 13–39)
BASOPHILS # BLD AUTO: 0.07 THOUSANDS/ΜL (ref 0–0.1)
BASOPHILS NFR BLD AUTO: 1 % (ref 0–1)
BILIRUB SERPL-MCNC: 0.4 MG/DL (ref 0.2–1)
BUN SERPL-MCNC: 9 MG/DL (ref 5–25)
CALCIUM SERPL-MCNC: 9.5 MG/DL (ref 8.4–10.2)
CARDIAC TROPONIN I PNL SERPL HS: 4 NG/L
CHLORIDE SERPL-SCNC: 103 MMOL/L (ref 96–108)
CO2 SERPL-SCNC: 28 MMOL/L (ref 21–32)
CREAT SERPL-MCNC: 1.01 MG/DL (ref 0.6–1.3)
EOSINOPHIL # BLD AUTO: 0.07 THOUSAND/ΜL (ref 0–0.61)
EOSINOPHIL NFR BLD AUTO: 1 % (ref 0–6)
ERYTHROCYTE [DISTWIDTH] IN BLOOD BY AUTOMATED COUNT: 15.5 % (ref 11.6–15.1)
GFR SERPL CREATININE-BSD FRML MDRD: 88 ML/MIN/1.73SQ M
GLUCOSE SERPL-MCNC: 110 MG/DL (ref 65–140)
HCT VFR BLD AUTO: 41 % (ref 36.5–49.3)
HGB BLD-MCNC: 13.2 G/DL (ref 12–17)
IMM GRANULOCYTES # BLD AUTO: 0.03 THOUSAND/UL (ref 0–0.2)
IMM GRANULOCYTES NFR BLD AUTO: 0 % (ref 0–2)
LYMPHOCYTES # BLD AUTO: 1.75 THOUSANDS/ΜL (ref 0.6–4.47)
LYMPHOCYTES NFR BLD AUTO: 24 % (ref 14–44)
MCH RBC QN AUTO: 27.5 PG (ref 26.8–34.3)
MCHC RBC AUTO-ENTMCNC: 32.2 G/DL (ref 31.4–37.4)
MCV RBC AUTO: 85 FL (ref 82–98)
MONOCYTES # BLD AUTO: 0.85 THOUSAND/ΜL (ref 0.17–1.22)
MONOCYTES NFR BLD AUTO: 12 % (ref 4–12)
NEUTROPHILS # BLD AUTO: 4.44 THOUSANDS/ΜL (ref 1.85–7.62)
NEUTS SEG NFR BLD AUTO: 62 % (ref 43–75)
NRBC BLD AUTO-RTO: 0 /100 WBCS
PLATELET # BLD AUTO: 318 THOUSANDS/UL (ref 149–390)
PMV BLD AUTO: 9.6 FL (ref 8.9–12.7)
POTASSIUM SERPL-SCNC: 3.4 MMOL/L (ref 3.5–5.3)
PROT SERPL-MCNC: 7.7 G/DL (ref 6.4–8.4)
RBC # BLD AUTO: 4.8 MILLION/UL (ref 3.88–5.62)
SODIUM SERPL-SCNC: 139 MMOL/L (ref 135–147)
WBC # BLD AUTO: 7.21 THOUSAND/UL (ref 4.31–10.16)

## 2022-06-13 PROCEDURE — 36415 COLL VENOUS BLD VENIPUNCTURE: CPT | Performed by: EMERGENCY MEDICINE

## 2022-06-13 PROCEDURE — 99284 EMERGENCY DEPT VISIT MOD MDM: CPT | Performed by: EMERGENCY MEDICINE

## 2022-06-13 PROCEDURE — 99285 EMERGENCY DEPT VISIT HI MDM: CPT

## 2022-06-13 PROCEDURE — 84484 ASSAY OF TROPONIN QUANT: CPT | Performed by: EMERGENCY MEDICINE

## 2022-06-13 PROCEDURE — 80053 COMPREHEN METABOLIC PANEL: CPT | Performed by: EMERGENCY MEDICINE

## 2022-06-13 PROCEDURE — 85025 COMPLETE CBC W/AUTO DIFF WBC: CPT | Performed by: EMERGENCY MEDICINE

## 2022-06-13 PROCEDURE — 93005 ELECTROCARDIOGRAM TRACING: CPT

## 2022-06-13 PROCEDURE — 71045 X-RAY EXAM CHEST 1 VIEW: CPT

## 2022-06-13 RX ORDER — SODIUM CHLORIDE 9 MG/ML
3 INJECTION INTRAVENOUS
Status: DISCONTINUED | OUTPATIENT
Start: 2022-06-13 | End: 2022-06-13 | Stop reason: HOSPADM

## 2022-06-13 RX ORDER — AMLODIPINE BESYLATE 5 MG/1
10 TABLET ORAL ONCE
Status: COMPLETED | OUTPATIENT
Start: 2022-06-13 | End: 2022-06-13

## 2022-06-13 RX ORDER — METFORMIN HYDROCHLORIDE 500 MG/1
500 TABLET, EXTENDED RELEASE ORAL
Qty: 90 TABLET | Refills: 0 | Status: SHIPPED | OUTPATIENT
Start: 2022-06-13

## 2022-06-13 RX ORDER — AMLODIPINE BESYLATE 10 MG/1
10 TABLET ORAL DAILY
Qty: 90 TABLET | Refills: 0 | Status: SHIPPED | OUTPATIENT
Start: 2022-06-13 | End: 2022-09-11

## 2022-06-13 RX ORDER — ASPIRIN 81 MG/1
81 TABLET ORAL DAILY
Qty: 90 TABLET | Refills: 0 | Status: SHIPPED | OUTPATIENT
Start: 2022-06-13

## 2022-06-13 RX ORDER — ATORVASTATIN CALCIUM 20 MG/1
20 TABLET, FILM COATED ORAL DAILY
Qty: 90 TABLET | Refills: 0 | Status: SHIPPED | OUTPATIENT
Start: 2022-06-13

## 2022-06-13 RX ADMIN — AMLODIPINE BESYLATE 10 MG: 5 TABLET ORAL at 18:23

## 2022-06-13 NOTE — ED PROVIDER NOTES
History  Chief Complaint   Patient presents with    Chest Pain     Pt states he started to have chest pain this morning and it feels tight  CP since this morning, constant, some radiation to neck, no sob, no n/v, no dizziness/diaphoresis  BP has been persistently elevated  Last seen several days ago for same symptoms  Did not f/u with his PMH between  Has not taken meds for BP for months  No f/c/cough/sput/abdo pain/leg pain or swelling  Prior to Admission Medications   Prescriptions Last Dose Informant Patient Reported? Taking?    ALPRAZolam (XANAX) 0 25 mg tablet   No No   Sig: Take 2 tablets (0 5 mg total) by mouth daily at bedtime as needed for anxiety for up to 3 days   Blood Glucose Monitoring Suppl (ONE TOUCH ULTRA 2) w/Device KIT  Self No No   Sig: by Does not apply route 3 (three) times a day   Blood Pressure Monitoring (Blood Pressure Monitor/M Cuff) MISC  Self No No   Sig: Use 2 (two) times a day   Lancets (onetouch ultrasoft) lancets  Self No No   Sig: Use as instructed   amLODIPine (NORVASC) 10 mg tablet  Self No No   Sig: Take 1 tablet (10 mg total) by mouth daily   aspirin (ECOTRIN LOW STRENGTH) 81 mg EC tablet  Self No No   Sig: Take 1 tablet (81 mg total) by mouth daily   atorvastatin (LIPITOR) 20 mg tablet  Self No No   Sig: Take 1 tablet (20 mg total) by mouth daily   glucose blood (OneTouch Ultra) test strip  Self No No   Sig: Use as instructed   ketorolac (TORADOL) 10 mg tablet   No No   Sig: Take 1 tablet (10 mg total) by mouth every 6 (six) hours as needed for moderate pain for up to 3 days   magnesium gluconate (MAGONATE) 500 mg tablet   No No   Sig: Take 1 tablet (500 mg total) by mouth daily   metFORMIN (GLUCOPHAGE-XR) 500 mg 24 hr tablet  Self No No   Sig: Take 1 tablet (500 mg total) by mouth daily with lunch   pantoprazole (PROTONIX) 40 mg tablet   No No   Sig: TAKE 1 TABLET(40 MG) BY MOUTH TWICE DAILY BEFORE MEALS   sertraline (ZOLOFT) 100 mg tablet   No No   Sig: Take 1 tablet (100 mg total) by mouth daily      Facility-Administered Medications: None       Past Medical History:   Diagnosis Date    Chest pain 1/12/2021    Diabetes mellitus ([facility] [address] )     Exposure to SARS-associated coronavirus 10/5/2020    Hypertension     Viral upper respiratory tract infection 10/7/2020       History reviewed  No pertinent surgical history  Family History   Problem Relation Age of Onset    Diabetes Mother     Diabetes Father      I have reviewed and agree with the history as documented  E-Cigarette/Vaping    E-Cigarette Use Never User      E-Cigarette/Vaping Substances     Social History     Tobacco Use    Smoking status: Current Every Day Smoker     Packs/day: 1 00     Years: 25 00     Pack years: 25 00     Types: Cigarettes     Start date: 2/18/1995    Smokeless tobacco: Never Used   Vaping Use    Vaping Use: Never used   Substance Use Topics    Alcohol use: Yes     Comment: 1-2 drinks daily    Drug use: Never       Review of Systems   Constitutional: Negative for fever  HENT: Negative for rhinorrhea  Eyes: Negative for visual disturbance  Respiratory: Negative for shortness of breath  Cardiovascular: Positive for chest pain  Gastrointestinal: Negative for abdominal pain, diarrhea and vomiting  Endocrine: Negative for polydipsia  Genitourinary: Negative for dysuria, frequency and hematuria  Musculoskeletal: Negative for neck stiffness  Skin: Negative for rash  Allergic/Immunologic: Negative for immunocompromised state  Neurological: Negative for speech difficulty, weakness and numbness  Psychiatric/Behavioral: Negative for suicidal ideas  Physical Exam  Physical Exam  Constitutional:       General: He is not in acute distress  HENT:      Head: Normocephalic and atraumatic  Right Ear: External ear normal       Left Ear: External ear normal       Nose: Nose normal       Mouth/Throat:      Pharynx: Oropharynx is clear     Eyes: Conjunctiva/sclera: Conjunctivae normal    Cardiovascular:      Rate and Rhythm: Normal rate and regular rhythm  Heart sounds: Normal heart sounds  No murmur heard  Pulmonary:      Effort: Pulmonary effort is normal       Breath sounds: Normal breath sounds  Abdominal:      General: Bowel sounds are normal       Palpations: Abdomen is soft  There is no mass  Tenderness: There is no abdominal tenderness  There is no guarding  Musculoskeletal:         General: No swelling or tenderness  Cervical back: Normal range of motion and neck supple  Right lower leg: No edema  Left lower leg: No edema  Skin:     General: Skin is warm and dry  Capillary Refill: Capillary refill takes less than 2 seconds  Neurological:      General: No focal deficit present  Mental Status: He is alert and oriented to person, place, and time     Psychiatric:         Mood and Affect: Mood normal          Vital Signs  ED Triage Vitals [06/13/22 1757]   Temperature Pulse Respirations Blood Pressure SpO2   98 °F (36 7 °C) 78 18 (!) 174/102 99 %      Temp Source Heart Rate Source Patient Position - Orthostatic VS BP Location FiO2 (%)   Oral -- Lying Right arm --      Pain Score       6           Vitals:    06/13/22 1757   BP: (!) 174/102   Pulse: 78   Patient Position - Orthostatic VS: Lying         Visual Acuity      ED Medications  Medications   sodium chloride (PF) 0 9 % injection 3 mL (has no administration in time range)       Diagnostic Studies  Results Reviewed     Procedure Component Value Units Date/Time    CBC and differential [[id number]] Collected: 06/13/22 1805    Lab Status: No result Specimen: Blood from Arm, Right     HS Troponin 0hr (reflex protocol) [[id number]] Collected: 06/13/22 1805    Lab Status: No result Specimen: Blood from Arm, Right     Comprehensive metabolic panel [[id number]] Collected: 06/13/22 1805    Lab Status: No result Specimen: Blood from Arm, Right                  X-ray chest 1 view portable    (Results Pending)              Procedures  Procedures         ED Course  ED Course as of 06/13/22 2104 Mon Jun 13, 2022   1849 Given constant pain all day, trop 4, ekg non-ischemic (sinus rate 67 axis wnl no ischemic changes) will dc home pmd f/u                               SBIRT 22yo+    Flowsheet Row Most Recent Value   SBIRT (23 yo +)    In order to provide better care to our patients, we are screening all of our patients for alcohol and drug use  Would it be okay to ask you these screening questions? No Filed at: 06/13/2022 1757                    MDM  Number of Diagnoses or Management Options  Chest pain, unspecified type  Diagnosis management comments: CP, neg ekg/trop/cxr  Suggested he f/u with cards/pmd to discuss either functional or anatomical studies to give further reassurance that his symptoms are not related to coronary disease  Disposition  Final diagnoses:   None     ED Disposition     None      Follow-up Information    None         Patient's Medications   Discharge Prescriptions    No medications on file       No discharge procedures on file      PDMP Review       Value Time User    PDMP Reviewed  Yes 9/30/2021  3:08 PM Angelina Chowdary DO          ED Provider  Electronically Signed by           Kellie Wills MD  06/13/22 2105

## 2022-06-14 LAB
ATRIAL RATE: 67 BPM
P AXIS: 21 DEGREES
PR INTERVAL: 140 MS
QRS AXIS: 47 DEGREES
QRSD INTERVAL: 86 MS
QT INTERVAL: 402 MS
QTC INTERVAL: 424 MS
T WAVE AXIS: 48 DEGREES
VENTRICULAR RATE: 67 BPM

## 2022-06-14 PROCEDURE — 93010 ELECTROCARDIOGRAM REPORT: CPT | Performed by: INTERNAL MEDICINE

## 2022-07-20 ENCOUNTER — HOSPITAL ENCOUNTER (EMERGENCY)
Facility: HOSPITAL | Age: 47
Discharge: HOME | End: 2022-07-20
Attending: EMERGENCY MEDICINE
Payer: COMMERCIAL

## 2022-07-20 ENCOUNTER — APPOINTMENT (EMERGENCY)
Dept: RADIOLOGY | Facility: HOSPITAL | Age: 47
End: 2022-07-20
Payer: COMMERCIAL

## 2022-07-20 VITALS
BODY MASS INDEX: 25.8 KG/M2 | DIASTOLIC BLOOD PRESSURE: 92 MMHG | RESPIRATION RATE: 18 BRPM | TEMPERATURE: 98.6 F | HEART RATE: 56 BPM | WEIGHT: 185 LBS | SYSTOLIC BLOOD PRESSURE: 153 MMHG | OXYGEN SATURATION: 94 %

## 2022-07-20 DIAGNOSIS — R07.9 CHEST PAIN, UNSPECIFIED: Primary | ICD-10-CM

## 2022-07-20 LAB
ALBUMIN SERPL BCP-MCNC: 3.9 G/DL (ref 3.5–5)
ALP SERPL-CCNC: 101 U/L (ref 46–116)
ALT SERPL W P-5'-P-CCNC: 40 U/L (ref 12–78)
ANION GAP SERPL CALCULATED.3IONS-SCNC: 11 MMOL/L (ref 4–13)
AST SERPL W P-5'-P-CCNC: 25 U/L (ref 5–45)
BASOPHILS # BLD AUTO: 0.07 THOUSANDS/ΜL (ref 0–0.1)
BASOPHILS NFR BLD AUTO: 1 % (ref 0–1)
BILIRUB SERPL-MCNC: 0.22 MG/DL (ref 0.2–1)
BUN SERPL-MCNC: 9 MG/DL (ref 5–25)
CALCIUM SERPL-MCNC: 8.9 MG/DL (ref 8.3–10.1)
CARDIAC TROPONIN I PNL SERPL HS: 3 NG/L
CHLORIDE SERPL-SCNC: 104 MMOL/L (ref 96–108)
CO2 SERPL-SCNC: 28 MMOL/L (ref 21–32)
CREAT SERPL-MCNC: 1.07 MG/DL (ref 0.6–1.3)
EOSINOPHIL # BLD AUTO: 0.1 THOUSAND/ΜL (ref 0–0.61)
EOSINOPHIL NFR BLD AUTO: 2 % (ref 0–6)
ERYTHROCYTE [DISTWIDTH] IN BLOOD BY AUTOMATED COUNT: 15.6 % (ref 11.6–15.1)
GFR SERPL CREATININE-BSD FRML MDRD: 82 ML/MIN/1.73SQ M
GLUCOSE SERPL-MCNC: 129 MG/DL (ref 65–140)
HCT VFR BLD AUTO: 43.2 % (ref 36.5–49.3)
HGB BLD-MCNC: 14.2 G/DL (ref 12–17)
IMM GRANULOCYTES # BLD AUTO: 0.05 THOUSAND/UL (ref 0–0.2)
IMM GRANULOCYTES NFR BLD AUTO: 1 % (ref 0–2)
LYMPHOCYTES # BLD AUTO: 2.15 THOUSANDS/ΜL (ref 0.6–4.47)
LYMPHOCYTES NFR BLD AUTO: 31 % (ref 14–44)
MCH RBC QN AUTO: 27.7 PG (ref 26.8–34.3)
MCHC RBC AUTO-ENTMCNC: 32.9 G/DL (ref 31.4–37.4)
MCV RBC AUTO: 84 FL (ref 82–98)
MONOCYTES # BLD AUTO: 0.71 THOUSAND/ΜL (ref 0.17–1.22)
MONOCYTES NFR BLD AUTO: 10 % (ref 4–12)
NEUTROPHILS # BLD AUTO: 3.76 THOUSANDS/ΜL (ref 1.85–7.62)
NEUTS SEG NFR BLD AUTO: 55 % (ref 43–75)
NRBC BLD AUTO-RTO: 0 /100 WBCS
PLATELET # BLD AUTO: 301 THOUSANDS/UL (ref 149–390)
PMV BLD AUTO: 9.5 FL (ref 8.9–12.7)
POTASSIUM SERPL-SCNC: 3.4 MMOL/L (ref 3.5–5.3)
PROT SERPL-MCNC: 8 G/DL (ref 6.4–8.4)
RBC # BLD AUTO: 5.12 MILLION/UL (ref 3.88–5.62)
SODIUM SERPL-SCNC: 143 MMOL/L (ref 135–147)
WBC # BLD AUTO: 6.84 THOUSAND/UL (ref 4.31–10.16)

## 2022-07-20 PROCEDURE — 99285 EMERGENCY DEPT VISIT HI MDM: CPT | Performed by: EMERGENCY MEDICINE

## 2022-07-20 PROCEDURE — 80053 COMPREHEN METABOLIC PANEL: CPT | Performed by: EMERGENCY MEDICINE

## 2022-07-20 PROCEDURE — 99285 EMERGENCY DEPT VISIT HI MDM: CPT

## 2022-07-20 PROCEDURE — 36415 COLL VENOUS BLD VENIPUNCTURE: CPT | Performed by: EMERGENCY MEDICINE

## 2022-07-20 PROCEDURE — 84484 ASSAY OF TROPONIN QUANT: CPT | Performed by: EMERGENCY MEDICINE

## 2022-07-20 PROCEDURE — 96374 THER/PROPH/DIAG INJ IV PUSH: CPT

## 2022-07-20 PROCEDURE — 93005 ELECTROCARDIOGRAM TRACING: CPT

## 2022-07-20 PROCEDURE — 71045 X-RAY EXAM CHEST 1 VIEW: CPT

## 2022-07-20 PROCEDURE — 85025 COMPLETE CBC W/AUTO DIFF WBC: CPT | Performed by: EMERGENCY MEDICINE

## 2022-07-20 RX ORDER — KETOROLAC TROMETHAMINE 30 MG/ML
15 INJECTION, SOLUTION INTRAMUSCULAR; INTRAVENOUS ONCE
Status: COMPLETED | OUTPATIENT
Start: 2022-07-20 | End: 2022-07-20

## 2022-07-20 RX ADMIN — KETOROLAC TROMETHAMINE 15 MG: 30 INJECTION, SOLUTION INTRAMUSCULAR at 06:02

## 2022-07-20 NOTE — ED PROVIDER NOTES
History  Chief Complaint   Patient presents with    Chest Pain     Patient c/o left side chest tightness under left breast, and right side numbness, feels like he slept on it, has not had too much sleep last night     Patient with a history of diabetes, hypertension, presents with complaint of left-sided chest pain x 2 days  Denies radiation of pain  Patient with a history of similar pain of unknown etiology  History provided by:  Patient   used: No        Prior to Admission Medications   Prescriptions Last Dose Informant Patient Reported? Taking?    Blood Glucose Monitoring Suppl (ONE TOUCH ULTRA 2) w/Device KIT  Self No No   Sig: by Does not apply route 3 (three) times a day   Blood Pressure Monitoring (Blood Pressure Monitor/M Cuff) MISC  Self No No   Sig: Use 2 (two) times a day   Lancets (onetouch ultrasoft) lancets  Self No No   Sig: Use as instructed   amLODIPine (NORVASC) 10 mg tablet 7/19/2022 at Unknown time  No Yes   Sig: Take 1 tablet (10 mg total) by mouth daily   aspirin (ECOTRIN LOW STRENGTH) 81 mg EC tablet Unknown at Unknown time  No No   Sig: Take 1 tablet (81 mg total) by mouth daily   atorvastatin (LIPITOR) 20 mg tablet 7/19/2022 at Unknown time  No Yes   Sig: Take 1 tablet (20 mg total) by mouth daily   glucose blood (OneTouch Ultra) test strip  Self No No   Sig: Use as instructed   ketorolac (TORADOL) 10 mg tablet   No No   Sig: Take 1 tablet (10 mg total) by mouth every 6 (six) hours as needed for moderate pain for up to 3 days   magnesium gluconate (MAGONATE) 500 mg tablet Unknown at Unknown time  No No   Sig: Take 1 tablet (500 mg total) by mouth daily   metFORMIN (GLUCOPHAGE-XR) 500 mg 24 hr tablet Unknown at Unknown time  No No   Sig: Take 1 tablet (500 mg total) by mouth daily with lunch   pantoprazole (PROTONIX) 40 mg tablet Not Taking at Unknown time  No No   Sig: TAKE 1 TABLET(40 MG) BY MOUTH TWICE DAILY BEFORE MEALS   Patient not taking: Reported on 7/20/2022      Facility-Administered Medications: None       Past Medical History:   Diagnosis Date    Chest pain 1/12/2021    [facility] mellitus ([facility] [address] )     Exposure to SARS-associated coronavirus 10/5/2020    Hypertension     Viral upper respiratory tract infection 10/7/2020       History reviewed  No pertinent surgical history  Family History   Problem Relation Age of Onset    Diabetes Mother     Diabetes Father      I have reviewed and agree with the history as documented  E-Cigarette/Vaping    E-Cigarette Use Never User      E-Cigarette/Vaping Substances     Social History     Tobacco Use    Smoking status: Current Every Day Smoker     Packs/day: 0 50     Years: 25 00     Pack years: 12 50     Types: Cigarettes     Start date: 2/18/1995    Smokeless tobacco: Never Used   Vaping Use    Vaping Use: Never used   Substance Use Topics    Alcohol use: Yes     Comment: 1-2 drinks daily    Drug use: Never       Review of Systems   Constitutional: Negative for chills and fever  Respiratory: Negative for cough, shortness of breath and wheezing  Cardiovascular: Positive for chest pain  Negative for palpitations  Gastrointestinal: Negative for abdominal pain, constipation, diarrhea, nausea and vomiting  Genitourinary: Negative for dysuria, flank pain, hematuria and urgency  Musculoskeletal: Negative for back pain  Skin: Negative for color change and rash  All other systems reviewed and are negative  Physical Exam  Physical Exam  Vitals and nursing note reviewed  Constitutional:       Appearance: He is well-developed  HENT:      Head: Normocephalic and atraumatic  Eyes:      Pupils: Pupils are equal, round, and reactive to light  Cardiovascular:      Rate and Rhythm: Normal rate and regular rhythm  Heart sounds: Normal heart sounds  Pulmonary:      Effort: Pulmonary effort is normal       Breath sounds: Normal breath sounds     Abdominal:      General: Bowel sounds are normal  There is no distension  Palpations: Abdomen is soft  There is no mass  Tenderness: There is no abdominal tenderness  There is no guarding or rebound  Musculoskeletal:      Right lower leg: No edema  Left lower leg: No edema  Skin:     General: Skin is warm and dry  Capillary Refill: Capillary refill takes less than 2 seconds  Neurological:      General: No focal deficit present  Mental Status: He is alert and oriented to person, place, and time  Psychiatric:         Behavior: Behavior normal          Thought Content:  Thought content normal          Judgment: Judgment normal          Vital Signs  ED Triage Vitals   Temperature Pulse Respirations Blood Pressure SpO2   07/20/22 0550 07/20/22 0545 07/20/22 0545 07/20/22 0545 07/20/22 0545   98 6 °F (37 °C) 69 18 (!) 176/108 98 %      Temp src Heart Rate Source Patient Position - Orthostatic VS BP Location FiO2 (%)   -- 07/20/22 0545 07/20/22 0600 07/20/22 0600 --    Monitor Sitting Left arm       Pain Score       07/20/22 0602       7           Vitals:    07/20/22 0545 07/20/22 0600 07/20/22 0615   BP: (!) 176/108 170/97 170/97   Pulse: 69 64 66   Patient Position - Orthostatic VS:  Sitting Sitting         Visual Acuity      ED Medications  Medications   ketorolac (TORADOL) injection 15 mg (15 mg Intravenous Given 7/20/22 0602)       Diagnostic Studies  Results Reviewed     Procedure Component Value Units Date/Time    HS Troponin I 2hr [[id number]]     Lab Status: No result Specimen: Blood     HS Troponin 0hr (reflex protocol) [[id number]]  (Normal) Collected: 07/20/22 0602    Lab Status: Final result Specimen: Blood from Arm, Right Updated: 07/20/22 0632     hs TnI 0hr 3 ng/L     Comprehensive metabolic panel [[id number]]  (Abnormal) Collected: 07/20/22 0602    Lab Status: Final result Specimen: Blood from Arm, Right Updated: 07/20/22 0625     Sodium 143 mmol/L      Potassium 3 4 mmol/L      Chloride 104 mmol/L      CO2 28 mmol/L ANION GAP 11 mmol/L      BUN 9 mg/dL      Creatinine 1 07 mg/dL      Glucose 129 mg/dL      Calcium 8 9 mg/dL      AST 25 U/L      ALT 40 U/L      Alkaline Phosphatase 101 U/L      Total Protein 8 0 g/dL      Albumin 3 9 g/dL      Total Bilirubin 0 22 mg/dL      eGFR 82 ml/min/1 73sq m     Narrative:      National Kidney Disease Foundation guidelines for Chronic Kidney Disease (CKD):     Stage 1 with normal or high GFR (GFR > 90 mL/min/1 73 square meters)    Stage 2 Mild CKD (GFR = 60-89 mL/min/1 73 square meters)    Stage 3A Moderate CKD (GFR = 45-59 mL/min/1 73 square meters)    Stage 3B Moderate CKD (GFR = 30-44 mL/min/1 73 square meters)    Stage 4 Severe CKD (GFR = 15-29 mL/min/1 73 square meters)    Stage 5 End Stage CKD (GFR <15 mL/min/1 73 square meters)  Note: GFR calculation is accurate only with a steady state creatinine    CBC and differential [[id number]]  (Abnormal) Collected: 07/20/22 0602    Lab Status: Final result Specimen: Blood from Arm, Right Updated: 07/20/22 0608     WBC 6 84 Thousand/uL      RBC 5 12 Million/uL      Hemoglobin 14 2 g/dL      Hematocrit 43 2 %      MCV 84 fL      MCH 27 7 pg      MCHC 32 9 g/dL      RDW 15 6 %      MPV 9 5 fL      Platelets 856 Thousands/uL      nRBC 0 /100 WBCs      Neutrophils Relative 55 %      Immat GRANS % 1 %      Lymphocytes Relative 31 %      Monocytes Relative 10 %      Eosinophils Relative 2 %      Basophils Relative 1 %      Neutrophils Absolute 3 76 Thousands/µL      Immature Grans Absolute 0 05 Thousand/uL      Lymphocytes Absolute 2 15 Thousands/µL      Monocytes Absolute 0 71 Thousand/µL      Eosinophils Absolute 0 10 Thousand/µL      Basophils Absolute 0 07 Thousands/µL                  XR chest 1 view portable    (Results Pending)              Procedures  ECG 12 Lead Documentation Only    Date/Time: 7/20/2022 5:43 AM  Performed by: Mora Mathias DO  Authorized by: Mora Mathias DO     Indications / Diagnosis:  Cp  ECG reviewed by me, the ED Provider: yes    Patient location:  ED  Previous ECG:     Previous ECG:  Compared to current    Similarity:  No change    Comparison to cardiac monitor: Yes    Interpretation:     Interpretation: normal    Rate:     ECG rate:  62bpm    ECG rate assessment: normal    Rhythm:     Rhythm: sinus rhythm    Ectopy:     Ectopy: none    QRS:     QRS axis:  Normal  Conduction:     Conduction: normal    ST segments:     ST segments:  Normal  T waves:     T waves: normal               ED Course             HEART Risk Score    Flowsheet Row Most Recent Value   Heart Score Risk Calculator    History 0 Filed at: 07/20/2022 0638   ECG 0 Filed at: 07/20/2022 9246   Age 1 Filed at: 07/20/2022 6502   Risk Factors 1 Filed at: 07/20/2022 8319   Troponin 0 Filed at: 07/20/2022 4074   HEART Score 2 Filed at: 07/20/2022 2057                PERC Rule for PE    Flowsheet Row Most Recent Value   PERC Rule for PE    Age >=50 0 Filed at: 07/20/2022 0605   HR >=100 0 Filed at: 07/20/2022 0605   O2 Sat on room air < 95% 0 Filed at: 07/20/2022 5483   History of PE or DVT 0 Filed at: 07/20/2022 1779   Recent trauma or surgery 0 Filed at: 07/20/2022 8554   Hemoptysis 0 Filed at: 07/20/2022 9842   Exogenous estrogen 0 Filed at: 07/20/2022 5340   Unilateral leg swelling 0 Filed at: 07/20/2022 4745   PERC Rule for PE Results 0 Filed at: 07/20/2022 0605              SBIRT 20yo+    Flowsheet Row Most Recent Value   SBIRT (23 yo +)    In order to provide better care to our patients, we are screening all of our patients for alcohol and drug use  Would it be okay to ask you these screening questions?  No Filed at: 07/20/2022 0557                    MDM  Number of Diagnoses or Management Options  Chest pain, unspecified: new and requires workup     Amount and/or Complexity of Data Reviewed  Clinical lab tests: ordered and reviewed  Tests in the radiology section of CPT®: ordered and reviewed    Risk of Complications, Morbidity, and/or Mortality  Presenting problems: high  Diagnostic procedures: high  Management options: high    Patient Progress  Patient progress: stable      Disposition  Final diagnoses:   Chest pain, unspecified     Time reflects when diagnosis was documented in both MDM as applicable and the Disposition within this note     Time User Action Codes Description Comment    7/20/2022  6:38 AM Marge Rodriguez [R07 [id number]] Chest pain, unspecified       ED Disposition     ED Disposition   Discharge    Condition   Stable    Date/Time   Wed Jul 20, 2022  6:38 AM    Comment   Ximena Grant discharge to home/self care  Follow-up Information     Follow up With Specialties Details Why Contact Info Additional Information    January Robertson MD Internal Medicine Schedule an appointment as soon as possible for a visit  for follow up [address] [facility] [place] [address] [facility] Schedule an appointment as soon as possible for a visit in 2 days for follow up [address] [facility], [facility] [address] [facility], [address], [address] [place], [place], [address] [phone]          Patient's Medications   Discharge Prescriptions    No medications on file       No discharge procedures on file      PDMP Review       Value Time User    PDMP Reviewed  Yes 9/30/2021  3:08 PM Kenisha Whittaker DO          ED Provider  Electronically Signed by           Enid Taveras DO  07/21/22 0429

## 2022-07-21 LAB
ATRIAL RATE: 62 BPM
P AXIS: 32 DEGREES
PR INTERVAL: 134 MS
QRS AXIS: 49 DEGREES
QRSD INTERVAL: 88 MS
QT INTERVAL: 398 MS
QTC INTERVAL: 403 MS
T WAVE AXIS: 48 DEGREES
VENTRICULAR RATE: 62 BPM

## 2022-07-21 PROCEDURE — 93010 ELECTROCARDIOGRAM REPORT: CPT | Performed by: INTERNAL MEDICINE

## 2022-10-12 PROBLEM — J06.9 VIRAL UPPER RESPIRATORY ILLNESS: Status: RESOLVED | Noted: 2020-10-07 | Resolved: 2022-10-12

## 2023-05-03 ENCOUNTER — APPOINTMENT (EMERGENCY)
Dept: RADIOLOGY | Facility: HOSPITAL | Age: 48
End: 2023-05-03

## 2023-05-03 ENCOUNTER — HOSPITAL ENCOUNTER (EMERGENCY)
Facility: HOSPITAL | Age: 48
Discharge: HOME | End: 2023-05-03
Attending: EMERGENCY MEDICINE

## 2023-05-03 VITALS
HEART RATE: 66 BPM | SYSTOLIC BLOOD PRESSURE: 166 MMHG | OXYGEN SATURATION: 100 % | DIASTOLIC BLOOD PRESSURE: 98 MMHG | WEIGHT: 195 LBS | HEIGHT: 71 IN | RESPIRATION RATE: 20 BRPM | BODY MASS INDEX: 27.3 KG/M2

## 2023-05-03 DIAGNOSIS — R07.9 CHEST PAIN, UNSPECIFIED TYPE: Primary | ICD-10-CM

## 2023-05-03 LAB
ALBUMIN SERPL BCP-MCNC: 4.3 G/DL (ref 3.5–5)
ALP SERPL-CCNC: 77 U/L (ref 34–104)
ALT SERPL W P-5'-P-CCNC: 44 U/L (ref 7–52)
ANION GAP SERPL CALCULATED.3IONS-SCNC: 7 MMOL/L (ref 4–13)
AST SERPL W P-5'-P-CCNC: 29 U/L (ref 13–39)
BASOPHILS # BLD AUTO: 0.05 THOUSANDS/ÂΜL (ref 0–0.1)
BASOPHILS NFR BLD AUTO: 1 % (ref 0–1)
BILIRUB SERPL-MCNC: 0.45 MG/DL (ref 0.2–1)
BUN SERPL-MCNC: 8 MG/DL (ref 5–25)
CALCIUM SERPL-MCNC: 9.5 MG/DL (ref 8.4–10.2)
CARDIAC TROPONIN I PNL SERPL HS: 3 NG/L
CHLORIDE SERPL-SCNC: 104 MMOL/L (ref 96–108)
CO2 SERPL-SCNC: 29 MMOL/L (ref 21–32)
CREAT SERPL-MCNC: 0.91 MG/DL (ref 0.6–1.3)
EOSINOPHIL # BLD AUTO: 0.11 THOUSAND/ÂΜL (ref 0–0.61)
EOSINOPHIL NFR BLD AUTO: 2 % (ref 0–6)
ERYTHROCYTE [DISTWIDTH] IN BLOOD BY AUTOMATED COUNT: 14.6 % (ref 11.6–15.1)
GFR SERPL CREATININE-BSD FRML MDRD: 100 ML/MIN/1.73SQ M
GLUCOSE SERPL-MCNC: 107 MG/DL (ref 65–140)
HCT VFR BLD AUTO: 40.8 % (ref 36.5–49.3)
HGB BLD-MCNC: 13.1 G/DL (ref 12–17)
IMM GRANULOCYTES # BLD AUTO: 0.02 THOUSAND/UL (ref 0–0.2)
IMM GRANULOCYTES NFR BLD AUTO: 0 % (ref 0–2)
LIPASE SERPL-CCNC: 41 U/L (ref 11–82)
LYMPHOCYTES # BLD AUTO: 2.1 THOUSANDS/ÂΜL (ref 0.6–4.47)
LYMPHOCYTES NFR BLD AUTO: 31 % (ref 14–44)
MCH RBC QN AUTO: 27.8 PG (ref 26.8–34.3)
MCHC RBC AUTO-ENTMCNC: 32.1 G/DL (ref 31.4–37.4)
MCV RBC AUTO: 86 FL (ref 82–98)
MONOCYTES # BLD AUTO: 0.74 THOUSAND/ÂΜL (ref 0.17–1.22)
MONOCYTES NFR BLD AUTO: 11 % (ref 4–12)
NEUTROPHILS # BLD AUTO: 3.78 THOUSANDS/ÂΜL (ref 1.85–7.62)
NEUTS SEG NFR BLD AUTO: 55 % (ref 43–75)
NRBC BLD AUTO-RTO: 0 /100 WBCS
PLATELET # BLD AUTO: 273 THOUSANDS/UL (ref 149–390)
PMV BLD AUTO: 9.3 FL (ref 8.9–12.7)
POTASSIUM SERPL-SCNC: 3.5 MMOL/L (ref 3.5–5.3)
PROT SERPL-MCNC: 7.5 G/DL (ref 6.4–8.4)
RBC # BLD AUTO: 4.72 MILLION/UL (ref 3.88–5.62)
SODIUM SERPL-SCNC: 140 MMOL/L (ref 135–147)
WBC # BLD AUTO: 6.8 THOUSAND/UL (ref 4.31–10.16)

## 2023-05-04 NOTE — ED NOTES
D/c reviewed with pt  Pt verbalized understanding and has no further questions at this time        Evan Fleming, ASHLEY  05/03/23 0206

## 2023-05-04 NOTE — ED PROVIDER NOTES
"History  Chief Complaint   Patient presents with   • Chest Pain     Pt reports sharp left sided chest pain, left side tingling, stomach pain     52-year-old male, presents with chest pain  Patient states he was at work, not doing anything strenuous, and developed pain to left upper chest   Patient states pain radiated to left neck and left arm and was associated with \" tingling\" in left upper extremity  Symptoms started about 3 hours ago, now resolved  Denies any recent symptoms or difficulty with exertion  History provided by:  Patient   used: No    Chest Pain  Associated symptoms: abdominal pain    Associated symptoms: no nausea and no weakness        Prior to Admission Medications   Prescriptions Last Dose Informant Patient Reported? Taking?    Blood Glucose Monitoring Suppl (ONE TOUCH ULTRA 2) w/Device KIT   No No   Sig: by Does not apply route 3 (three) times a day   Blood Pressure Monitoring (Blood Pressure Monitor/M Cuff) MISC   No No   Sig: Use 2 (two) times a day   Lancets (onetouch ultrasoft) lancets   No No   Sig: Use as instructed   amLODIPine (NORVASC) 10 mg tablet   No No   Sig: Take 1 tablet (10 mg total) by mouth daily   aspirin (ECOTRIN LOW STRENGTH) 81 mg EC tablet   No No   Sig: Take 1 tablet (81 mg total) by mouth daily   atorvastatin (LIPITOR) 20 mg tablet   No No   Sig: Take 1 tablet (20 mg total) by mouth daily   glucose blood (OneTouch Ultra) test strip   No No   Sig: Use as instructed   ketorolac (TORADOL) 10 mg tablet   No No   Sig: Take 1 tablet (10 mg total) by mouth every 6 (six) hours as needed for moderate pain for up to 3 days   magnesium gluconate (MAGONATE) 500 mg tablet   No No   Sig: Take 1 tablet (500 mg total) by mouth daily   metFORMIN (GLUCOPHAGE-XR) 500 mg 24 hr tablet   No No   Sig: Take 1 tablet (500 mg total) by mouth daily with lunch   pantoprazole (PROTONIX) 40 mg tablet   No No   Sig: TAKE 1 TABLET(40 MG) BY MOUTH TWICE DAILY BEFORE MEALS " Patient not taking: Reported on 7/20/2022      Facility-Administered Medications: None       Past Medical History:   Diagnosis Date   • Chest pain 1/12/2021   • Diabetes mellitus ([facility] [address] )    • Exposure to SARS-associated coronavirus 10/5/2020   • Hypertension    • Viral upper respiratory tract infection 10/7/2020       History reviewed  No pertinent surgical history  Family History   Problem Relation Age of Onset   • Diabetes Mother    • Diabetes Father      I have reviewed and agree with the history as documented  E-Cigarette/Vaping   • E-Cigarette Use Never User      E-Cigarette/Vaping Substances     Social History     Tobacco Use   • Smoking status: Every Day     Packs/day: 0 50     Years: 25 00     Pack years: 12 50     Types: Cigarettes     Start date: 2/18/1995   • Smokeless tobacco: Never   Vaping Use   • Vaping Use: Never used   Substance Use Topics   • Alcohol use: Yes     Comment: 1-2 drinks daily   • Drug use: Never       Review of Systems   Constitutional: Negative  Respiratory: Negative  Cardiovascular: Positive for chest pain  Gastrointestinal: Positive for abdominal pain  Negative for nausea  Neurological: Negative for weakness and light-headedness  Physical Exam  Physical Exam  Vitals and nursing note reviewed  Constitutional:       General: He is not in acute distress  HENT:      Head: Normocephalic and atraumatic  Eyes:      Extraocular Movements: Extraocular movements intact  Pupils: Pupils are equal, round, and reactive to light  Cardiovascular:      Rate and Rhythm: Normal rate and regular rhythm  Pulmonary:      Effort: Pulmonary effort is normal       Breath sounds: Normal breath sounds  Chest:      Chest wall: No deformity or tenderness  Abdominal:      Palpations: Abdomen is soft  Tenderness: There is no abdominal tenderness  Musculoskeletal:         General: Normal range of motion  Skin:     General: Skin is warm and dry     Neurological: General: No focal deficit present  Mental Status: He is alert and oriented to person, place, and time           Vital Signs  ED Triage Vitals [05/03/23 2024]   Temp Pulse Respirations Blood Pressure SpO2   -- 66 20 166/98 100 %      Temp src Heart Rate Source Patient Position - Orthostatic VS BP Location FiO2 (%)   -- Monitor Sitting Right arm --      Pain Score       6           Vitals:    05/03/23 2024   BP: 166/98   Pulse: 66   Patient Position - Orthostatic VS: Sitting         Visual Acuity      ED Medications  Medications - No data to display    Diagnostic Studies  Results Reviewed     Procedure Component Value Units Date/Time    HS Troponin 0hr (reflex protocol) [[id number]]  (Normal) Collected: 05/03/23 2033    Lab Status: Final result Specimen: Blood from Arm, Right Updated: 05/03/23 2100     hs TnI 0hr 3 ng/L     Comprehensive metabolic panel [[id number]] Collected: 05/03/23 2033    Lab Status: Final result Specimen: Blood from Arm, Right Updated: 05/03/23 2053     Sodium 140 mmol/L      Potassium 3 5 mmol/L      Chloride 104 mmol/L      CO2 29 mmol/L      ANION GAP 7 mmol/L      BUN 8 mg/dL      Creatinine 0 91 mg/dL      Glucose 107 mg/dL      Calcium 9 5 mg/dL      AST 29 U/L      ALT 44 U/L      Alkaline Phosphatase 77 U/L      Total Protein 7 5 g/dL      Albumin 4 3 g/dL      Total Bilirubin 0 45 mg/dL      eGFR 100 ml/min/1 73sq m     Narrative:      [facility] [place] guidelines for Chronic Kidney Disease (CKD):   •  Stage 1 with normal or high GFR (GFR > 90 mL/min/1 73 square meters)  •  Stage 2 Mild CKD (GFR = 60-89 mL/min/1 73 square meters)  •  Stage 3A Moderate CKD (GFR = 45-59 mL/min/1 73 square meters)  •  Stage 3B Moderate CKD (GFR = 30-44 mL/min/1 73 square meters)  •  Stage 4 Severe CKD (GFR = 15-29 mL/min/1 73 square meters)  •  Stage 5 End Stage CKD (GFR <15 mL/min/1 73 square meters)  Note: GFR calculation is accurate only with a steady state creatinine    Lipase [[id number]]  (Normal) Collected: 05/03/23 2033    Lab Status: Final result Specimen: Blood from Arm, Right Updated: 05/03/23 2053     Lipase 41 u/L     CBC and differential [[id number]] Collected: 05/03/23 2033    Lab Status: Final result Specimen: Blood from Arm, Right Updated: 05/03/23 2039     WBC 6 80 Thousand/uL      RBC 4 72 Million/uL      Hemoglobin 13 1 g/dL      Hematocrit 40 8 %      MCV 86 fL      MCH 27 8 pg      MCHC 32 1 g/dL      RDW 14 6 %      MPV 9 3 fL      Platelets 951 Thousands/uL      nRBC 0 /100 WBCs      Neutrophils Relative 55 %      Immat GRANS % 0 %      Lymphocytes Relative 31 %      Monocytes Relative 11 %      Eosinophils Relative 2 %      Basophils Relative 1 %      Neutrophils Absolute 3 78 Thousands/µL      Immature Grans Absolute 0 02 Thousand/uL      Lymphocytes Absolute 2 10 Thousands/µL      Monocytes Absolute 0 74 Thousand/µL      Eosinophils Absolute 0 11 Thousand/µL      Basophils Absolute 0 05 Thousands/µL                  XR chest 2 views    (Results Pending)              Procedures  ECG 12 Lead Documentation Only    Date/Time: 5/3/2023 8:40 PM  Performed by: Viviana Coon MD  Authorized by: Viviana Coon MD     ECG reviewed by me, the ED Provider: yes    Patient location:  ED  Interpretation:     Interpretation: normal    Rate:     ECG rate:  62    ECG rate assessment: normal    Rhythm:     Rhythm: sinus rhythm    Ectopy:     Ectopy: none    QRS:     QRS axis:  Normal    QRS intervals:  Normal  Conduction:     Conduction: normal    ST segments:     ST segments:  Normal             ED Course  ED Course as of 05/03/23 2121   Wed May 03, 2023   2115 Chest x-ray independently reviewed by myself, no infiltrate or effusion, no acute findings  2120 Patient has been normal sinus rhythm on cardiac monitor  Asymptomatic since arrival   Patient with low heart score, low risk of major adverse cardiac event in next 6 weeks               HEART Risk Score    Flowsheet Row Most Recent Value   Heart Score Risk Calculator    History 1 Filed at: 05/03/2023 2117   ECG 0 Filed at: 05/03/2023 2117   Age 1 Filed at: 05/03/2023 2117   Risk Factors 1 Filed at: 05/03/2023 2117   Troponin 0 Filed at: 05/03/2023 2117   HEART Score 3 Filed at: 05/03/2023 2117                        SBIRT 22yo+    Flowsheet Row Most Recent Value   Initial Alcohol Screen: US AUDIT-C     1  How often do you have a drink containing alcohol? 0 Filed at: 05/03/2023 2034   2  How many drinks containing alcohol do you have on a typical day you are drinking? 0 Filed at: 05/03/2023 2034   3a  Male UNDER 65: How often do you have five or more drinks on one occasion? 0 Filed at: 05/03/2023 2034   3b  FEMALE Any Age, or MALE 65+: How often do you have 4 or more drinks on one occassion? 0 Filed at: 05/03/2023 2034   Audit-C Score 0 Filed at: 05/03/2023 2034   ZANA: How many times in the past year have you    Used an illegal drug or used a prescription medication for non-medical reasons? Never Filed at: 05/03/2023 2034                    Medical Decision Making  78-year-old male, presenting with chest pain  Differential diagnosis includes ACS, GERD, muscle pain among other diagnoses  Patient currently comfortable and in no distress, symptoms currently resolved  Will continue to monitor in ED and reevaluate  I have reviewed test results and diagnosis with patient  Follow-up plan reviewed  Precautions for acute return for re-evaluation are reviewed  Opportunity to ask questions was provided  Patient verbalizes understanding  Amount and/or Complexity of Data Reviewed  Labs: ordered  Decision-making details documented in ED Course  Radiology: ordered and independent interpretation performed  Decision-making details documented in ED Course  ECG/medicine tests: ordered and independent interpretation performed  Decision-making details documented in ED Course            Disposition  Final diagnoses:   Chest pain, unspecified type     Time reflects when diagnosis was documented in both MDM as applicable and the Disposition within this note     Time User Action Codes Description Comment    5/3/2023  9:18 PM Katie Castillo Add [R07 9] Chest pain, unspecified type       ED Disposition     ED Disposition   Discharge    Condition   Stable    Date/Time   Wed May 3, 2023  9:18 PM    [address] discharge to home/self care  Follow-up Information     Follow up With Specialties Details Why Contact Info    Sohail Duvall MD Internal Medicine Schedule an appointment as soon as possible for a visit   [address]  [phone]            Patient's Medications   Discharge Prescriptions    No medications on file       No discharge procedures on file      PDMP Review       Value Time User    PDMP Reviewed  Yes 9/30/2021  3:08 PM Orly Carney DO          ED Provider  Electronically Signed by           Britany Abreu MD  05/03/23 2040       Britany Abreu MD  05/03/23 2121

## 2023-05-05 LAB
ATRIAL RATE: 62 BPM
P AXIS: 24 DEGREES
PR INTERVAL: 154 MS
QRS AXIS: 52 DEGREES
QRSD INTERVAL: 88 MS
QT INTERVAL: 404 MS
QTC INTERVAL: 410 MS
T WAVE AXIS: 20 DEGREES
VENTRICULAR RATE: 62 BPM

## 2023-06-15 RX ORDER — AMLODIPINE BESYLATE 10 MG/1
TABLET ORAL
Qty: 90 TABLET | Refills: 0 | OUTPATIENT
Start: 2023-06-15

## 2023-12-14 ENCOUNTER — OFFICE VISIT (OUTPATIENT)
Dept: INTERNAL MEDICINE CLINIC | Facility: CLINIC | Age: 48
End: 2023-12-14

## 2023-12-14 VITALS
DIASTOLIC BLOOD PRESSURE: 78 MMHG | RESPIRATION RATE: 20 BRPM | OXYGEN SATURATION: 99 % | HEIGHT: 73 IN | HEART RATE: 70 BPM | WEIGHT: 173.6 LBS | SYSTOLIC BLOOD PRESSURE: 126 MMHG | TEMPERATURE: 97.8 F | BODY MASS INDEX: 23.01 KG/M2

## 2023-12-14 DIAGNOSIS — Z00.00 HEALTH CARE MAINTENANCE: ICD-10-CM

## 2023-12-14 DIAGNOSIS — I10 HYPERTENSION, UNSPECIFIED TYPE: ICD-10-CM

## 2023-12-14 DIAGNOSIS — E11.9 TYPE 2 DIABETES MELLITUS WITHOUT COMPLICATION, WITHOUT LONG-TERM CURRENT USE OF INSULIN (HCC): Primary | ICD-10-CM

## 2023-12-14 DIAGNOSIS — E88.810 METABOLIC SYNDROME: ICD-10-CM

## 2023-12-14 DIAGNOSIS — Z11.59 NEED FOR HEPATITIS C SCREENING TEST: ICD-10-CM

## 2023-12-14 DIAGNOSIS — B35.1 ONYCHOMYCOSIS: ICD-10-CM

## 2023-12-14 LAB — SL AMB POCT HEMOGLOBIN AIC: 6 (ref ?–6.5)

## 2023-12-14 RX ORDER — ATORVASTATIN CALCIUM 20 MG/1
20 TABLET, FILM COATED ORAL DAILY
Qty: 90 TABLET | Refills: 0 | Status: SHIPPED | OUTPATIENT
Start: 2023-12-14

## 2023-12-14 RX ORDER — METFORMIN HYDROCHLORIDE 500 MG/1
500 TABLET, EXTENDED RELEASE ORAL
Qty: 90 TABLET | Refills: 0 | Status: SHIPPED | OUTPATIENT
Start: 2023-12-14

## 2023-12-14 RX ORDER — BLOOD-GLUCOSE METER
EACH MISCELLANEOUS 3 TIMES DAILY
Qty: 1 KIT | Refills: 0 | Status: SHIPPED | OUTPATIENT
Start: 2023-12-14

## 2023-12-14 RX ORDER — TERBINAFINE HYDROCHLORIDE 250 MG/1
250 TABLET ORAL DAILY
Status: CANCELLED | OUTPATIENT
Start: 2023-12-14

## 2023-12-14 RX ORDER — AMLODIPINE BESYLATE 10 MG/1
10 TABLET ORAL DAILY
Qty: 90 TABLET | Refills: 0 | Status: SHIPPED | OUTPATIENT
Start: 2023-12-14 | End: 2024-03-13

## 2023-12-14 RX ORDER — LANCETS
EACH MISCELLANEOUS
Qty: 100 EACH | Refills: 1 | Status: SHIPPED | OUTPATIENT
Start: 2023-12-14

## 2023-12-14 RX ORDER — PRENATAL VIT 91/IRON/FOLIC/DHA 28-975-200
COMBINATION PACKAGE (EA) ORAL 2 TIMES DAILY
Qty: 12 G | Refills: 0 | Status: SHIPPED | OUTPATIENT
Start: 2023-12-14

## 2023-12-14 RX ORDER — BLOOD SUGAR DIAGNOSTIC
STRIP MISCELLANEOUS
Qty: 100 EACH | Refills: 1 | Status: SHIPPED | OUTPATIENT
Start: 2023-12-14

## 2023-12-14 NOTE — ASSESSMENT & PLAN NOTE
Patient was previously taking amlodipine 10 mg daily  Office blood pressure stable at 126/78  Resume amlodipine 10 mg daily

## 2023-12-14 NOTE — PROGRESS NOTES
INTERNAL MEDICINE FOLLOW-UP OFFICE VISIT  [facility] - [facility] INTERNAL MEDICINE OMI    NAME: Luis A Spicer  AGE: 53 y.o. SEX: male  : 1975     DATE: 2023     Assessment and Plan:     1. Type 2 diabetes mellitus without complication, without long-term current use of insulin ([facility])  Assessment & Plan:    Lab Results   Component Value Date    HGBA1C 6.0 2023     Hemoglobin A1c 6.0 in the office today, previously 6.5  Will resume routine blood glucose checks and have patient resume metformin, which he has been off of for over a year. Ambulatory referrals to podiatry and opthalmology placed  Will re-check A1c in three months    Orders:  -     Lipid panel; Future; Expected date: 2024  -     Hemoglobin A1C; Future; Expected date: 2024  -     aspirin (ECOTRIN LOW STRENGTH) 81 mg EC tablet; Take 1 tablet (81 mg total) by mouth daily  -     Blood Glucose Monitoring Suppl (ONE TOUCH ULTRA 2) w/Device KIT; Use 3 (three) times a day  -     glucose blood (OneTouch Ultra) test strip; Use as instructed  -     Lancets (onetouch ultrasoft) lancets; Use as instructed  -     metFORMIN (GLUCOPHAGE-XR) 500 mg 24 hr tablet; Take 1 tablet (500 mg total) by mouth daily with lunch  -     Ambulatory Referral to Ophthalmology; Future  -     Ambulatory Referral to Podiatry; Future  -     POCT hemoglobin A1c    2. Hypertension, unspecified type  Assessment & Plan:  Patient was previously taking amlodipine 10 mg daily  Office blood pressure stable at 126/78  Resume amlodipine 10 mg daily    Orders:  -     amLODIPine (NORVASC) 10 mg tablet; Take 1 tablet (10 mg total) by mouth daily  -     Blood Pressure Monitoring (Blood Pressure Monitor/M Cuff) MISC; Use 2 (two) times a day    3. Metabolic syndrome  -     atorvastatin (LIPITOR) 20 mg tablet; Take 1 tablet (20 mg total) by mouth daily  -     Lipid panel; Future; Expected date: 2024    4.  Onychomycosis  -     terbinafine (LamISIL) 1 % cream; Apply topically 2 (two) times a day    5. Health care maintenance  -     Lipid panel; Future; Expected date: 03/14/2024  -     Hemoglobin A1C; Future; Expected date: 03/14/2024  -     Ambulatory Referral to Ophthalmology; Future  -     Ambulatory Referral to Podiatry; Future    6. Need for hepatitis C screening test  -     Hepatitis C antibody; Future        Return in about 3 months (around 3/14/2024) for Next scheduled follow up. Chief Complaint:     Chief Complaint   Patient presents with    Follow-up        History of Present Illness:     Jaspal Guy. Mary Cason is a 53year old male with PMH of T2DM not on insulin, HTN who presents for follow-up. The patient denies any acute or chronic complaints during today's office visit. He has not followed up with our clinic in over a year and states that he is out of all of his medications. He has not had glucose supplies and has not been taking his metformin for over a year. He does report some left sided neck pain that is worse with turning the head to the left. The issue is chronic and likely musculoskeletal. He does feel the pain is exacerbated with smoking cigarettes. He does additionally have a recently noticed umbilical hernia for which he associates with his work as a , which entails a lot of lifting. The following portions of the patient's history were reviewed and updated as appropriate: allergies, current medications, past family history, past medical history, past social history, past surgical history and problem list.     Review of Systems:     Review of Systems   Constitutional:  Negative for chills and fever. HENT:  Negative for ear pain and sore throat. Eyes:  Negative for pain and visual disturbance. Respiratory:  Negative for cough and shortness of breath. Cardiovascular:  Negative for chest pain and palpitations. Gastrointestinal:  Negative for abdominal pain and vomiting.    Genitourinary:  Negative for dysuria and hematuria. Musculoskeletal:  Positive for neck pain. Negative for arthralgias and back pain. Skin:  Negative for color change and rash. Neurological:  Negative for seizures and syncope. All other systems reviewed and are negative. Problem List:     Patient Active Problem List   Diagnosis    Continuous dependence on cigarette smoking    Clubbing of nail    Nonintractable hemiplegic migraine    Alcohol use    Hypertension    Type 2 diabetes mellitus (HCC)    Chest pain    Dyspepsia    Lung nodule < 6cm on CT    Physical exam, annual    Nicotine dependence        Objective:     /78 (BP Location: Left arm, Patient Position: Sitting, Cuff Size: Standard)   Pulse 70   Temp 97.8 °F (36.6 °C)   Resp 20   Ht 6' 0.5" (1.842 m)   Wt 78.7 kg (173 lb 9.6 oz)   SpO2 99%   BMI 23.22 kg/m²     Physical Exam  Vitals and nursing note reviewed. Constitutional:       General: He is not in acute distress. Appearance: He is well-developed. He is not ill-appearing. HENT:      Head: Normocephalic and atraumatic. Right Ear: External ear normal.      Left Ear: External ear normal.   Eyes:      Extraocular Movements: Extraocular movements intact. Conjunctiva/sclera: Conjunctivae normal.   Cardiovascular:      Rate and Rhythm: Normal rate and regular rhythm. Pulses: Normal pulses. Heart sounds: Normal heart sounds. No murmur heard. Pulmonary:      Effort: Pulmonary effort is normal. No respiratory distress. Breath sounds: Normal breath sounds. No wheezing. Abdominal:      General: Abdomen is flat. Bowel sounds are normal.      Palpations: Abdomen is soft. Tenderness: There is no abdominal tenderness. Musculoskeletal:         General: No swelling. Cervical back: Neck supple. Comments: Bilateral toe nails with appearance of onychomycosis    Feet:      Right foot:      Skin integrity: Callus and dry skin present.       Left foot:      Skin integrity: Callus and dry skin present. Skin:     General: Skin is warm and dry. Capillary Refill: Capillary refill takes less than 2 seconds. Neurological:      General: No focal deficit present. Mental Status: He is alert and oriented to person, place, and time. Psychiatric:         Mood and Affect: Mood normal.         Behavior: Behavior is cooperative. Diabetic Foot Exam    Patient's shoes and socks removed. Right Foot/Ankle   Right Foot Inspection  Skin Exam: dry skin, callus and callus. Toe Exam: ROM and strength within normal limits. No swelling and no tenderness    Sensory   Monofilament testing: intact    Vascular  Capillary refills: < 3 seconds      Left Foot/Ankle  Left Foot Inspection  Skin Exam: dry skin and callus. Toe Exam: ROM and strength within normal limits. No swelling and no tenderness.      Sensory   Monofilament testing: intact    Vascular  Capillary refills: < 3 seconds      Assign Risk Category  No deformity present        Pertinent Laboratory/Diagnostic Studies:    Laboratory Results: I have personally reviewed the pertinent laboratory results/reports     CBC:   Results from Last 12 Months   Lab Units 05/03/23 2033   WBC Thousand/uL 6.80   RBC Million/uL 4.72   HEMOGLOBIN g/dL 13.1   HEMATOCRIT % 40.8   MCV fL 86   MCH pg 27.8   MCHC g/dL 32.1   RDW % 14.6   MPV fL 9.3   PLATELETS Thousands/uL 273   NRBC AUTO /100 WBCs 0   NEUTROS PCT % 55   LYMPHS PCT % 31   MONOS PCT % 11   EOS PCT % 2   BASOS PCT % 1   NEUTROS ABS Thousands/µL 3.78   LYMPHS ABS Thousands/µL 2.10   MONOS ABS Thousand/µL 0.74   EOS ABS Thousand/µL 0.11     Chemistry Profile:   Results from Last 12 Months   Lab Units 05/03/23 2033   POTASSIUM mmol/L 3.5   CHLORIDE mmol/L 104   CO2 mmol/L 29   BUN mg/dL 8   CREATININE mg/dL 0.91   GLUCOSE RANDOM mg/dL 107   CALCIUM mg/dL 9.5   AST U/L 29   ALT U/L 44   ALK PHOS U/L 77   EGFR ml/min/1.73sq m 100       Radiology/Other Diagnostic Testing Results: I have personally reviewed pertinent reports.       [address], DO  [facility] INTERNAL MEDICINE [facility]

## 2023-12-14 NOTE — ASSESSMENT & PLAN NOTE
Lab Results   Component Value Date    HGBA1C 6.0 12/14/2023     Hemoglobin A1c 6.0 in the office today, previously 6.5  Will resume routine blood glucose checks and have patient resume metformin, which he has been off of for over a year.   Ambulatory referrals to podiatry and opthalmology placed  Will re-check A1c in three months

## 2024-01-08 ENCOUNTER — APPOINTMENT (EMERGENCY)
Dept: RADIOLOGY | Facility: HOSPITAL | Age: 49
End: 2024-01-08
Payer: COMMERCIAL

## 2024-01-08 ENCOUNTER — HOSPITAL ENCOUNTER (OUTPATIENT)
Facility: HOSPITAL | Age: 49
Setting detail: OBSERVATION
Discharge: HOME | End: 2024-01-09
Attending: EMERGENCY MEDICINE | Admitting: FAMILY MEDICINE
Payer: COMMERCIAL

## 2024-01-08 ENCOUNTER — APPOINTMENT (EMERGENCY)
Dept: CT IMAGING | Facility: HOSPITAL | Age: 49
End: 2024-01-08
Payer: COMMERCIAL

## 2024-01-08 DIAGNOSIS — R29.90 STROKE-LIKE SYMPTOMS: ICD-10-CM

## 2024-01-08 DIAGNOSIS — G45.9 TIA (TRANSIENT ISCHEMIC ATTACK): Primary | ICD-10-CM

## 2024-01-08 DIAGNOSIS — R07.9 CHEST PAIN, UNSPECIFIED TYPE: ICD-10-CM

## 2024-01-08 LAB
2HR DELTA HS TROPONIN: -1 NG/L
ALBUMIN SERPL BCP-MCNC: 4.7 G/DL (ref 3.5–5)
ALP SERPL-CCNC: 85 U/L (ref 34–104)
ALT SERPL W P-5'-P-CCNC: 24 U/L (ref 7–52)
ANION GAP SERPL CALCULATED.3IONS-SCNC: 8 MMOL/L
AST SERPL W P-5'-P-CCNC: 18 U/L (ref 13–39)
BASOPHILS # BLD AUTO: 0.09 THOUSANDS/ÂΜL (ref 0–0.1)
BASOPHILS NFR BLD AUTO: 1 % (ref 0–1)
BILIRUB SERPL-MCNC: 0.33 MG/DL (ref 0.2–1)
BUN SERPL-MCNC: 7 MG/DL (ref 5–25)
CALCIUM SERPL-MCNC: 9.4 MG/DL (ref 8.4–10.2)
CARDIAC TROPONIN I PNL SERPL HS: 2 NG/L
CARDIAC TROPONIN I PNL SERPL HS: 3 NG/L
CHLORIDE SERPL-SCNC: 101 MMOL/L (ref 96–108)
CO2 SERPL-SCNC: 29 MMOL/L (ref 21–32)
CREAT SERPL-MCNC: 0.88 MG/DL (ref 0.6–1.3)
EOSINOPHIL # BLD AUTO: 0.06 THOUSAND/ÂΜL (ref 0–0.61)
EOSINOPHIL NFR BLD AUTO: 1 % (ref 0–6)
ERYTHROCYTE [DISTWIDTH] IN BLOOD BY AUTOMATED COUNT: 15.8 % (ref 11.6–15.1)
GFR SERPL CREATININE-BSD FRML MDRD: 102 ML/MIN/1.73SQ M
GLUCOSE SERPL-MCNC: 117 MG/DL (ref 65–140)
HCT VFR BLD AUTO: 40.1 % (ref 36.5–49.3)
HGB BLD-MCNC: 12.9 G/DL (ref 12–17)
IMM GRANULOCYTES # BLD AUTO: 0.04 THOUSAND/UL (ref 0–0.2)
IMM GRANULOCYTES NFR BLD AUTO: 0 % (ref 0–2)
LYMPHOCYTES # BLD AUTO: 2.58 THOUSANDS/ÂΜL (ref 0.6–4.47)
LYMPHOCYTES NFR BLD AUTO: 24 % (ref 14–44)
MCH RBC QN AUTO: 26.1 PG (ref 26.8–34.3)
MCHC RBC AUTO-ENTMCNC: 32.2 G/DL (ref 31.4–37.4)
MCV RBC AUTO: 81 FL (ref 82–98)
MONOCYTES # BLD AUTO: 0.78 THOUSAND/ÂΜL (ref 0.17–1.22)
MONOCYTES NFR BLD AUTO: 7 % (ref 4–12)
NEUTROPHILS # BLD AUTO: 7.22 THOUSANDS/ÂΜL (ref 1.85–7.62)
NEUTS SEG NFR BLD AUTO: 67 % (ref 43–75)
NRBC BLD AUTO-RTO: 0 /100 WBCS
PLATELET # BLD AUTO: 305 THOUSANDS/UL (ref 149–390)
PMV BLD AUTO: 9.9 FL (ref 8.9–12.7)
POTASSIUM SERPL-SCNC: 3.4 MMOL/L (ref 3.5–5.3)
PROT SERPL-MCNC: 7.7 G/DL (ref 6.4–8.4)
RBC # BLD AUTO: 4.94 MILLION/UL (ref 3.88–5.62)
SODIUM SERPL-SCNC: 138 MMOL/L (ref 135–147)
TSH SERPL DL<=0.05 MIU/L-ACNC: 0.8 UIU/ML (ref 0.45–4.5)
WBC # BLD AUTO: 10.77 THOUSAND/UL (ref 4.31–10.16)

## 2024-01-08 PROCEDURE — G1004 CDSM NDSC: HCPCS

## 2024-01-08 PROCEDURE — 99285 EMERGENCY DEPT VISIT HI MDM: CPT | Performed by: EMERGENCY MEDICINE

## 2024-01-08 PROCEDURE — 84484 ASSAY OF TROPONIN QUANT: CPT | Performed by: EMERGENCY MEDICINE

## 2024-01-08 PROCEDURE — 93005 ELECTROCARDIOGRAM TRACING: CPT

## 2024-01-08 PROCEDURE — 70450 CT HEAD/BRAIN W/O DYE: CPT

## 2024-01-08 PROCEDURE — 84443 ASSAY THYROID STIM HORMONE: CPT

## 2024-01-08 PROCEDURE — 71045 X-RAY EXAM CHEST 1 VIEW: CPT

## 2024-01-08 PROCEDURE — 36415 COLL VENOUS BLD VENIPUNCTURE: CPT

## 2024-01-08 PROCEDURE — 85025 COMPLETE CBC W/AUTO DIFF WBC: CPT | Performed by: EMERGENCY MEDICINE

## 2024-01-08 PROCEDURE — 99285 EMERGENCY DEPT VISIT HI MDM: CPT

## 2024-01-08 PROCEDURE — 80053 COMPREHEN METABOLIC PANEL: CPT | Performed by: EMERGENCY MEDICINE

## 2024-01-08 NOTE — LETTER
[facility] EMERGENCY DEPARTMENT  [address]  [place] [id number]  Dept: [phone]    January 9, 2024     Patient: Laron Gordon   YOB: 1975   Date of Visit: 1/8/2024       To Whom it May Concern:    Laron Gordon is under my professional care. He was seen in the hospital from 1/8/2024 to 01/09/24. He may return to work on 1/15/24 without limitations.    If you have any questions or concerns, please don't hesitate to call.         Sincerely,          Shdai Sandoval MD

## 2024-01-08 NOTE — Clinical Note
Laron Gordon was seen and treated in our emergency department on 1/8/2024.                Diagnosis:     Laron  may return to work on return date.    He may return on this date: 01/15/2024         If you have any questions or concerns, please don't hesitate to call.      Dilcia Arrington RN    ______________________________           _______________          _______________  Hospital Representative                              Date                                Time

## 2024-01-09 ENCOUNTER — APPOINTMENT (OUTPATIENT)
Dept: MRI IMAGING | Facility: HOSPITAL | Age: 49
End: 2024-01-09
Payer: COMMERCIAL

## 2024-01-09 ENCOUNTER — APPOINTMENT (OUTPATIENT)
Dept: NON INVASIVE DIAGNOSTICS | Facility: HOSPITAL | Age: 49
End: 2024-01-09
Payer: COMMERCIAL

## 2024-01-09 VITALS
WEIGHT: 170 LBS | SYSTOLIC BLOOD PRESSURE: 132 MMHG | TEMPERATURE: 98.4 F | RESPIRATION RATE: 16 BRPM | HEIGHT: 71 IN | BODY MASS INDEX: 23.8 KG/M2 | OXYGEN SATURATION: 98 % | DIASTOLIC BLOOD PRESSURE: 80 MMHG | HEART RATE: 53 BPM

## 2024-01-09 PROBLEM — R29.90 STROKE-LIKE SYMPTOMS: Status: ACTIVE | Noted: 2024-01-09

## 2024-01-09 PROBLEM — R20.2 PARESTHESIAS: Status: ACTIVE | Noted: 2024-01-09

## 2024-01-09 LAB
ANION GAP SERPL CALCULATED.3IONS-SCNC: 6 MMOL/L
AORTIC ROOT: 2.6 CM
APICAL FOUR CHAMBER EJECTION FRACTION: 64 %
ASCENDING AORTA: 3 CM
BUN SERPL-MCNC: 7 MG/DL (ref 5–25)
CALCIUM SERPL-MCNC: 9 MG/DL (ref 8.4–10.2)
CHLORIDE SERPL-SCNC: 103 MMOL/L (ref 96–108)
CHOLEST SERPL-MCNC: 123 MG/DL
CO2 SERPL-SCNC: 27 MMOL/L (ref 21–32)
CREAT SERPL-MCNC: 0.72 MG/DL (ref 0.6–1.3)
E WAVE DECELERATION TIME: 216 MS
E/A RATIO: 1.22
ERYTHROCYTE [DISTWIDTH] IN BLOOD BY AUTOMATED COUNT: 15.8 % (ref 11.6–15.1)
FRACTIONAL SHORTENING: 42 (ref 28–44)
GFR SERPL CREATININE-BSD FRML MDRD: 111 ML/MIN/1.73SQ M
GLUCOSE SERPL-MCNC: 118 MG/DL (ref 65–140)
GLUCOSE SERPL-MCNC: 120 MG/DL (ref 65–140)
GLUCOSE SERPL-MCNC: 121 MG/DL (ref 65–140)
GLUCOSE SERPL-MCNC: 85 MG/DL (ref 65–140)
HCT VFR BLD AUTO: 38.2 % (ref 36.5–49.3)
HDLC SERPL-MCNC: 40 MG/DL
HGB BLD-MCNC: 12.2 G/DL (ref 12–17)
INTERVENTRICULAR SEPTUM IN DIASTOLE (PARASTERNAL SHORT AXIS VIEW): 0.8 CM
INTERVENTRICULAR SEPTUM: 0.8 CM (ref 0.6–1.1)
LAAS-AP2: 19.2 CM2
LAAS-AP4: 13.4 CM2
LDLC SERPL CALC-MCNC: 48 MG/DL (ref 0–100)
LEFT ATRIUM SIZE: 3.8 CM
LEFT ATRIUM VOLUME (MOD BIPLANE): 45 ML
LEFT ATRIUM VOLUME INDEX (MOD BIPLANE): 23 ML/M2
LEFT INTERNAL DIMENSION IN SYSTOLE: 2.8 CM (ref 2.1–4)
LEFT VENTRICULAR INTERNAL DIMENSION IN DIASTOLE: 4.8 CM (ref 3.5–6)
LEFT VENTRICULAR POSTERIOR WALL IN END DIASTOLE: 0.8 CM
LEFT VENTRICULAR STROKE VOLUME: 77 ML
LVSV (TEICH): 77 ML
MCH RBC QN AUTO: 26 PG (ref 26.8–34.3)
MCHC RBC AUTO-ENTMCNC: 31.9 G/DL (ref 31.4–37.4)
MCV RBC AUTO: 81 FL (ref 82–98)
MV E'TISSUE VEL-SEP: 10 CM/S
MV PEAK A VEL: 0.65 M/S
MV PEAK E VEL: 79 CM/S
MV STENOSIS PRESSURE HALF TIME: 63 MS
MV VALVE AREA P 1/2 METHOD: 3.49
PLATELET # BLD AUTO: 271 THOUSANDS/UL (ref 149–390)
PMV BLD AUTO: 9.7 FL (ref 8.9–12.7)
POTASSIUM SERPL-SCNC: 3.3 MMOL/L (ref 3.5–5.3)
RBC # BLD AUTO: 4.7 MILLION/UL (ref 3.88–5.62)
RIGHT ATRIAL 2D VOLUME: 33 ML
RIGHT ATRIUM AREA SYSTOLE A4C: 13.8 CM2
RIGHT VENTRICLE ID DIMENSION: 3.9 CM
SL CV LEFT ATRIUM LENGTH A2C: 5.5 CM
SL CV LV EF: 55
SL CV PED ECHO LEFT VENTRICLE DIASTOLIC VOLUME (MOD BIPLANE) 2D: 107 ML
SL CV PED ECHO LEFT VENTRICLE SYSTOLIC VOLUME (MOD BIPLANE) 2D: 30 ML
SODIUM SERPL-SCNC: 136 MMOL/L (ref 135–147)
TRICUSPID ANNULAR PLANE SYSTOLIC EXCURSION: 1.6 CM
TRIGL SERPL-MCNC: 176 MG/DL
WBC # BLD AUTO: 8.13 THOUSAND/UL (ref 4.31–10.16)

## 2024-01-09 PROCEDURE — 36415 COLL VENOUS BLD VENIPUNCTURE: CPT | Performed by: NURSE PRACTITIONER

## 2024-01-09 PROCEDURE — 85027 COMPLETE CBC AUTOMATED: CPT | Performed by: NURSE PRACTITIONER

## 2024-01-09 PROCEDURE — 99222 1ST HOSP IP/OBS MODERATE 55: CPT | Performed by: FAMILY MEDICINE

## 2024-01-09 PROCEDURE — 82948 REAGENT STRIP/BLOOD GLUCOSE: CPT

## 2024-01-09 PROCEDURE — 70551 MRI BRAIN STEM W/O DYE: CPT

## 2024-01-09 PROCEDURE — 93306 TTE W/DOPPLER COMPLETE: CPT

## 2024-01-09 PROCEDURE — 99239 HOSP IP/OBS DSCHRG MGMT >30: CPT | Performed by: FAMILY MEDICINE

## 2024-01-09 PROCEDURE — 80048 BASIC METABOLIC PNL TOTAL CA: CPT | Performed by: NURSE PRACTITIONER

## 2024-01-09 PROCEDURE — 80061 LIPID PANEL: CPT | Performed by: NURSE PRACTITIONER

## 2024-01-09 PROCEDURE — 93306 TTE W/DOPPLER COMPLETE: CPT | Performed by: INTERNAL MEDICINE

## 2024-01-09 PROCEDURE — 99283 EMERGENCY DEPT VISIT LOW MDM: CPT | Performed by: PSYCHIATRY & NEUROLOGY

## 2024-01-09 RX ORDER — HEPARIN SODIUM 5000 [USP'U]/ML
5000 INJECTION, SOLUTION INTRAVENOUS; SUBCUTANEOUS EVERY 8 HOURS SCHEDULED
Status: DISCONTINUED | OUTPATIENT
Start: 2024-01-09 | End: 2024-01-09 | Stop reason: HOSPADM

## 2024-01-09 RX ORDER — POTASSIUM CHLORIDE 20 MEQ/1
40 TABLET, EXTENDED RELEASE ORAL ONCE
Status: COMPLETED | OUTPATIENT
Start: 2024-01-09 | End: 2024-01-09

## 2024-01-09 RX ORDER — ACETAMINOPHEN 325 MG/1
650 TABLET ORAL EVERY 6 HOURS PRN
Status: DISCONTINUED | OUTPATIENT
Start: 2024-01-09 | End: 2024-01-09 | Stop reason: HOSPADM

## 2024-01-09 RX ORDER — PANTOPRAZOLE SODIUM 40 MG/1
40 TABLET, DELAYED RELEASE ORAL
Status: DISCONTINUED | OUTPATIENT
Start: 2024-01-09 | End: 2024-01-09 | Stop reason: HOSPADM

## 2024-01-09 RX ORDER — INSULIN LISPRO 100 [IU]/ML
1-6 INJECTION, SOLUTION INTRAVENOUS; SUBCUTANEOUS
Status: DISCONTINUED | OUTPATIENT
Start: 2024-01-09 | End: 2024-01-09 | Stop reason: HOSPADM

## 2024-01-09 RX ORDER — NICOTINE 21 MG/24HR
1 PATCH, TRANSDERMAL 24 HOURS TRANSDERMAL DAILY
Status: DISCONTINUED | OUTPATIENT
Start: 2024-01-09 | End: 2024-01-09 | Stop reason: HOSPADM

## 2024-01-09 RX ORDER — PANTOPRAZOLE SODIUM 40 MG/1
40 TABLET, DELAYED RELEASE ORAL 2 TIMES DAILY
Qty: 180 TABLET | Refills: 0 | Status: SHIPPED | OUTPATIENT
Start: 2024-01-09

## 2024-01-09 RX ORDER — ASPIRIN 81 MG/1
324 TABLET, CHEWABLE ORAL ONCE
Status: COMPLETED | OUTPATIENT
Start: 2024-01-09 | End: 2024-01-09

## 2024-01-09 RX ORDER — INSULIN LISPRO 100 [IU]/ML
1-5 INJECTION, SOLUTION INTRAVENOUS; SUBCUTANEOUS
Status: DISCONTINUED | OUTPATIENT
Start: 2024-01-09 | End: 2024-01-09 | Stop reason: HOSPADM

## 2024-01-09 RX ORDER — ATORVASTATIN CALCIUM 40 MG/1
40 TABLET, FILM COATED ORAL EVERY EVENING
Status: DISCONTINUED | OUTPATIENT
Start: 2024-01-09 | End: 2024-01-09 | Stop reason: HOSPADM

## 2024-01-09 RX ADMIN — ASPIRIN 81 MG CHEWABLE TABLET 324 MG: 81 TABLET CHEWABLE at 01:04

## 2024-01-09 RX ADMIN — ATORVASTATIN CALCIUM 40 MG: 40 TABLET, FILM COATED ORAL at 01:03

## 2024-01-09 RX ADMIN — POTASSIUM CHLORIDE 40 MEQ: 1500 TABLET, EXTENDED RELEASE ORAL at 14:22

## 2024-01-09 RX ADMIN — PANTOPRAZOLE SODIUM 40 MG: 40 TABLET, DELAYED RELEASE ORAL at 14:21

## 2024-01-09 RX ADMIN — HEPARIN SODIUM 5000 UNITS: 5000 INJECTION INTRAVENOUS; SUBCUTANEOUS at 07:35

## 2024-01-09 RX ADMIN — NICOTINE 1 PATCH: 21 PATCH, EXTENDED RELEASE TRANSDERMAL at 09:47

## 2024-01-09 NOTE — ASSESSMENT & PLAN NOTE
Left sided numbness, left chest pain, left facial droop.    CT: no acute intracranial abnormality   CTA: not obtained in ED as numbness and facial droop resolved.  Stroke Pathway.  Frequent vital signs.  Neuro checks.  Telemetry.  Check lipid panel and A1c.  Aspirin load then resume 81 mg daily   Increase lipitor from 20 mg to 40 mg.  Obtain MRI brain.  Check TTE.  Consult Neurology.  PT/OT consultation.

## 2024-01-09 NOTE — ASSESSMENT & PLAN NOTE
Left sided chest pain since 4 pm.  Describes as pinching sensation.    Suspect msk.  Shoveling snow yesterday.   Troponin x 2 negative   Initial EKG: NSR   Patient had numerous ED visit with chest pain for last 2 years  However cardiac workup has been negative  Start PPI twice daily  Establish with GI for EGD outpatient

## 2024-01-09 NOTE — ED ATTENDING ATTESTATION
1/8/2024  I, Castro Belcher MD, saw and evaluated the patient. I have discussed the patient with the resident/non-physician practitioner and agree with the resident's/non-physician practitioner's findings, Plan of Care, and MDM as documented in the resident's/non-physician practitioner's note, except where noted. All available labs and Radiology studies were reviewed.  I was present for key portions of any procedure(s) performed by the resident/non-physician practitioner and I was immediately available to provide assistance.       At this point I agree with the current assessment done in the Emergency Department.  I have conducted an independent evaluation of this patient a history and physical is as follows: Patient is a 48 year old male with chest pain and sob and left arm and leg numbness since this afternoon. Sx have since resolved. No cough. No fever. No N/V. No trauma. (+) smokes. No CAD in family. Was last seen at [facility] for type 2 DM. PMP[employer]ARERX website checked on this patient and last Rx filled was on 8/4/23 for suboxone for 30 day supply. Thrombolytics not indicated since sx have since resolved. NCAT. PERRL. Moist mucous membranes. Lungs clear. Heart regular without murmur. Nontender chest wall. Abdomen soft and nontender. Good bowel sounds. No edema. No rash noted. No focal deficits. DDX including but not limited to: ACS, MI, doubt PE (PERC negative), PTX, pneumonia, doubt dissection; pleurisy, pericarditis, myocarditis, doubt rhabdomyolysis; GI etiology. DDX including but not limited to: TIA, CVA, metabolic abnormality,  atypical migraine, seizure, tumor, thyroid disease. I reviewed EKG, labs and CXR. Will check CT head.     ED Course         Critical Care Time  Procedures

## 2024-01-09 NOTE — ASSESSMENT & PLAN NOTE
Left sided chest pain since 4 pm.  Describes as pinching sensation.    Suspect msk.  Shoveling snow yesterday.   Troponin x 2 negative   Initial EKG: NSR   Monitor on telemetry.  Check fasting lipid panel and A1c.    Admit under Observation Status.

## 2024-01-09 NOTE — ASSESSMENT & PLAN NOTE
48 y.o. male with HTN, DM2, and tobacco use who presented to [facility] on 1/8/2024 due to chest pain and SOB followed by left neck/arm/leg paresthesias.  Of note, he has had similar symptoms in the past in the setting of chest pain.    Upon arrival to the ED, /96.  CT head negative for acute intracranial abnormalities.  CMP, CBC, troponin, and TSH unremarkable.    MRI brain negative for acute infarct.    On exam today, paresthesias have resolved, but continues to have mild chest discomfort.  Etiology for symptoms remains unclear, but unlikely TIA as he has had the exact same symptoms several times before.  (Per chart review, since 2021, he has had 13 ED visits for chest pain associated with intermittent left sided paresthesias).  Possibly musculoskeletal.    Plan:  - Echo completed; final report pending  - Continue home aspirin 81 mg daily  - Continue home Lipitor 20 mg QHS  - PT/OT  - Continue to monitor and notify neurology with any changes.   - Medical management and supportive care per primary team. Correction of any metabolic or infectious disturbances

## 2024-01-09 NOTE — H&P
[facility]  H&P  Name: Laron Gordon 48 y.o. male I MRN: [id number]  Unit/Bed#: ED-42 I Date of Admission: 1/8/2024   Date of Service: 1/9/2024 I Hospital Day: 0      Assessment/Plan   * Stroke-like symptoms  Assessment & Plan  Left sided numbness, left chest pain, left facial droop.    CT: no acute intracranial abnormality   CTA: not obtained in ED as numbness and facial droop resolved.  Stroke Pathway.  Frequent vital signs.  Neuro checks.  Telemetry.  Check lipid panel and A1c.  Aspirin load then resume 81 mg daily   Increase lipitor from 20 mg to 40 mg.  Obtain MRI brain.  Check TTE.  Consult Neurology.  PT/OT consultation.    Chest pain  Assessment & Plan  Left sided chest pain since 4 pm.  Describes as pinching sensation.    Suspect msk.  Shoveling snow yesterday.   Troponin x 2 negative   Initial EKG: NSR   Monitor on telemetry.  Check fasting lipid panel and A1c.    Admit under Observation Status.    Nicotine dependence  Assessment & Plan  1 ppd  Cessation   NRT    Type 2 diabetes mellitus (HCC)  Assessment & Plan  Lab Results   Component Value Date    HGBA1C 6.0 12/14/2023     Resume metformin on dc   Accucheck, ssi while inpt    Hypertension  Assessment & Plan  Hold amlodipine overnight  Stroke work up    Alcohol use  Assessment & Plan  1-2 drinks daily         VTE Pharmacologic Prophylaxis: VTE Score: 6 High Risk (Score >/= 5) - Pharmacological DVT Prophylaxis Ordered: heparin. Sequential Compression Devices Ordered.  Code Status: Level 1 - Full Code   Discussion with family: Patient declined call to .     Anticipated Length of Stay: Patient will be admitted on an observation basis with an anticipated length of stay of less than 2 midnights secondary to stroke work up.    Total Time Spent on Date of Encounter in care of patient:  mins. This time was spent on one or more of the following: performing physical exam; counseling and coordination of care; obtaining or  "reviewing history; documenting in the medical record; reviewing/ordering tests, medications or procedures; communicating with other healthcare professionals and discussing with patient's family/caregivers.    Chief Complaint: left sided chest pain, neck pain, left sided numbness, left facial droop    History of Present Illness:  Laron Gordon is a 48 y.o. male with a PMH of HTN, HLD, DM, nicotine abuse,  who presents with left sided chest pain, left neck pain, left sided numbness, left facial droop that began around 4 pm.  He reports symptoms worsened around 7 pm, prompting ED evaluation.  He continues to have left chest pain described as a \"pinching\" sensation.  Troponin and EKG negative.  He does report shoveling a lot of snow yesterday.  Regarding left sided numbness and facial droop, this is now resolved.  He reports some left lower extremity numbness due to lying in the stretcher.  He underwent CT head without contrast which did not show any acute abnormality.  Concern for TIA given risk factors.  Admitted for stroke work up.      Review of Systems:  Review of Systems   Constitutional:  Negative for appetite change, chills and fever.   Respiratory:  Negative for shortness of breath and wheezing.    Cardiovascular:  Positive for chest pain. Negative for leg swelling.   Neurological:  Positive for facial asymmetry (now resolved) and numbness (left). Negative for dizziness, tremors, seizures, syncope, speech difficulty, weakness, light-headedness and headaches.   All other systems reviewed and are negative.      Past Medical and Surgical History:   Past Medical History:   Diagnosis Date    Chest pain 1/12/2021    Diabetes mellitus (HCC)     Exposure to SARS-associated coronavirus 10/5/2020    Hypertension     Viral upper respiratory tract infection 10/7/2020       History reviewed. No pertinent surgical history.    Meds/Allergies:  Prior to Admission medications    Medication Sig Start Date End Date Taking? " Authorizing Provider   amLODIPine (NORVASC) 10 mg tablet Take 1 tablet (10 mg total) by mouth daily 12/14/23 3/13/24  Swathi Andersen, DO   aspirin (ECOTRIN LOW STRENGTH) 81 mg EC tablet Take 1 tablet (81 mg total) by mouth daily 12/14/23   Swathi Andersen, DO   atorvastatin (LIPITOR) 20 mg tablet Take 1 tablet (20 mg total) by mouth daily 12/14/23   Swathi Andersen, DO   Blood Glucose Monitoring Suppl (ONE TOUCH ULTRA 2) w/Device KIT Use 3 (three) times a day 12/14/23   Swathi Andersen, DO   Blood Pressure Monitoring (Blood Pressure Monitor/M Cuff) MISC Use 2 (two) times a day 12/14/23   Swathi Andersen, DO   glucose blood (OneTouch Ultra) test strip Use as instructed 12/14/23   Swathi Andersen, DO   ketorolac (TORADOL) 10 mg tablet Take 1 tablet (10 mg total) by mouth every 6 (six) hours as needed for moderate pain for up to 3 days 6/8/22 6/11/22  Sienna Edgar PA-C   Lancets (onetouch ultrasoft) lancets Use as instructed 12/14/23   Swathi Andersen, DO   magnesium gluconate (MAGONATE) 500 mg tablet Take 1 tablet (500 mg total) by mouth daily  Patient not taking: Reported on 12/14/2023 10/28/21   David Santana MD   metFORMIN (GLUCOPHAGE-XR) 500 mg 24 hr tablet Take 1 tablet (500 mg total) by mouth daily with lunch 12/14/23   Swathi Andersen, DO   pantoprazole (PROTONIX) 40 mg tablet TAKE 1 TABLET(40 MG) BY MOUTH TWICE DAILY BEFORE MEALS  Patient not taking: Reported on 7/20/2022 5/16/22   David Santana MD   terbinafine (LamISIL) 1 % cream Apply topically 2 (two) times a day 12/14/23   Swathi Andersen, DO     I have reviewed home medications with patient personally.    Allergies: No Known Allergies    Social History:  Marital Status: /Civil Union   Occupation:   Patient Pre-hospital Living Situation: Home  Patient Pre-hospital Level of Mobility: walks  Patient Pre-hospital Diet Restrictions:   Substance Use History:   Social History     Substance and Sexual Activity    Alcohol Use Yes    Comment: 1-2 drinks daily     Social History     Tobacco Use   Smoking Status Every Day    Current packs/day: 1.00    Average packs/day: 0.5 packs/day for 28.9 years (14.5 ttl pk-yrs)    Types: Cigarettes    Start date: 2/18/1995    Passive exposure: Past   Smokeless Tobacco Never     Social History     Substance and Sexual Activity   Drug Use Never       Family History:  Family History   Problem Relation Age of Onset    Diabetes Mother     Diabetes Father        Physical Exam:     Vitals:   Blood Pressure: 127/77 (01/09/24 0020)  Pulse: 57 (01/09/24 0020)  Temperature: 98.4 °F (36.9 °C) (01/08/24 1921)  Temp Source: Oral (01/08/24 1921)  Respirations: 16 (01/09/24 0020)  Weight - Scale: 77.2 kg (170 lb 3.1 oz) (01/08/24 1921)  SpO2: 98 % (01/09/24 0020)    Physical Exam  Constitutional:       General: He is not in acute distress.     Appearance: Normal appearance. He is not ill-appearing or toxic-appearing.   HENT:      Head: Normocephalic and atraumatic.      Right Ear: External ear normal.      Left Ear: External ear normal.      Nose: Nose normal.      Mouth/Throat:      Pharynx: Oropharynx is clear.   Eyes:      Extraocular Movements: Extraocular movements intact.      Conjunctiva/sclera: Conjunctivae normal.   Cardiovascular:      Rate and Rhythm: Normal rate and regular rhythm.      Pulses: Normal pulses.      Heart sounds: Normal heart sounds.   Pulmonary:      Effort: Pulmonary effort is normal.      Breath sounds: Normal breath sounds.   Abdominal:      General: Bowel sounds are normal.      Palpations: Abdomen is soft.   Musculoskeletal:         General: Normal range of motion.      Cervical back: Normal range of motion.      Right lower leg: No edema.      Left lower leg: No edema.   Skin:     General: Skin is warm.      Capillary Refill: Capillary refill takes less than 2 seconds.   Neurological:      General: No focal deficit present.      Mental Status: He is alert and oriented to  person, place, and time.      Sensory: No sensory deficit.      Motor: No weakness.      Coordination: Coordination normal.   Psychiatric:         Mood and Affect: Mood normal.         Behavior: Behavior normal.          Additional Data:     Lab Results:  Results from last 7 days   Lab Units 01/08/24 1929   WBC Thousand/uL 10.77*   HEMOGLOBIN g/dL 12.9   HEMATOCRIT % 40.1   PLATELETS Thousands/uL 305   NEUTROS PCT % 67   LYMPHS PCT % 24   MONOS PCT % 7   EOS PCT % 1     Results from last 7 days   Lab Units 01/08/24  1929   SODIUM mmol/L 138   POTASSIUM mmol/L 3.4*   CHLORIDE mmol/L 101   CO2 mmol/L 29   BUN mg/dL 7   CREATININE mg/dL 0.88   ANION GAP mmol/L 8   CALCIUM mg/dL 9.4   ALBUMIN g/dL 4.7   TOTAL BILIRUBIN mg/dL 0.33   ALK PHOS U/L 85   ALT U/L 24   AST U/L 18   GLUCOSE RANDOM mg/dL 117         Results from last 7 days   Lab Units 01/09/24  0058   POC GLUCOSE mg/dl 120               Lines/Drains:  Invasive Devices       Peripheral Intravenous Line  Duration             Peripheral IV 01/08/24 Right Antecubital <1 day                        Imaging: Reviewed radiology reports from this admission including: CT head  CT head without contrast   Final Result by Pavel Velazquez DO (01/09 0019)      No acute intracranial abnormality.                  Workstation performed: [id number]         XR chest 1 view portable   ED Interpretation by Dennis Montana MD (01/08 2235)   My personal interpretation-no acute cardiopulmonary disease appreciated.      MRI Inpatient Order    (Results Pending)       EKG and Other Studies Reviewed on Admission:   EKG: NSR. HR 97.    ** Please Note: This note has been constructed using a voice recognition system. **

## 2024-01-09 NOTE — ED PROVIDER NOTES
"History  Chief Complaint   Patient presents with    Chest Pain     Reports worsening sharp left sided CP since 4pm, reports left arm and leg numbness. +SOB  Reports pain feels like a \"pulling down\" on my chest.       48-year-old male presenting due to episode of chest pain, left-sided body numbness, shortness of breath.  Patient states he was getting ready for work when he started having numbness in his left neck, left upper and lower extremities as well as a squeezing chest pain in his left side of his chest.  Patient states he felt tingling in his left side as well with shortness of breath.  When he told a coworker about it they said that his face looked droopy.  The symptoms lasted for about 4 to 6 hours until they have currently all resolved.  Patient is no longer feeling the numbness, chest pain or shortness of breath.  Patient states he was in his normal state of health prior to this episode and denies any fever, chills, nausea, vomiting, URI-like symptoms, dysuria, hematuria, headache, lightheadedness, changes in vision.        Prior to Admission Medications   Prescriptions Last Dose Informant Patient Reported? Taking?   Blood Glucose Monitoring Suppl (ONE TOUCH ULTRA 2) w/Device KIT   No No   Sig: Use 3 (three) times a day   Blood Pressure Monitoring (Blood Pressure Monitor/M Cuff) MISC   No No   Sig: Use 2 (two) times a day   Lancets (onetouch ultrasoft) lancets   No No   Sig: Use as instructed   amLODIPine (NORVASC) 10 mg tablet   No No   Sig: Take 1 tablet (10 mg total) by mouth daily   aspirin (ECOTRIN LOW STRENGTH) 81 mg EC tablet   No No   Sig: Take 1 tablet (81 mg total) by mouth daily   atorvastatin (LIPITOR) 20 mg tablet   No No   Sig: Take 1 tablet (20 mg total) by mouth daily   glucose blood (OneTouch Ultra) test strip   No No   Sig: Use as instructed   ketorolac (TORADOL) 10 mg tablet   No No   Sig: Take 1 tablet (10 mg total) by mouth every 6 (six) hours as needed for moderate pain for up to 3 " days   magnesium gluconate (MAGONATE) 500 mg tablet   No No   Sig: Take 1 tablet (500 mg total) by mouth daily   Patient not taking: Reported on 12/14/2023   metFORMIN (GLUCOPHAGE-XR) 500 mg 24 hr tablet   No No   Sig: Take 1 tablet (500 mg total) by mouth daily with lunch   pantoprazole (PROTONIX) 40 mg tablet   No No   Sig: TAKE 1 TABLET(40 MG) BY MOUTH TWICE DAILY BEFORE MEALS   Patient not taking: Reported on 7/20/2022   terbinafine (LamISIL) 1 % cream   No No   Sig: Apply topically 2 (two) times a day      Facility-Administered Medications: None       Past Medical History:   Diagnosis Date    Chest pain 1/12/2021    Diabetes mellitus (HCC)     Exposure to SARS-associated coronavirus 10/5/2020    Hypertension     Viral upper respiratory tract infection 10/7/2020       History reviewed. No pertinent surgical history.    Family History   Problem Relation Age of Onset    Diabetes Mother     Diabetes Father      I have reviewed and agree with the history as documented.    E-Cigarette/Vaping    E-Cigarette Use Never User      E-Cigarette/Vaping Substances    Nicotine No     THC No     CBD No      Social History     Tobacco Use    Smoking status: Every Day     Current packs/day: 1.00     Average packs/day: 0.5 packs/day for 28.9 years (14.5 ttl pk-yrs)     Types: Cigarettes     Start date: 2/18/1995     Passive exposure: Past    Smokeless tobacco: Never   Vaping Use    Vaping status: Never Used   Substance Use Topics    Alcohol use: Yes     Comment: 1-2 drinks daily    Drug use: Never        Review of Systems   Constitutional:  Negative for chills and fever.   HENT:  Negative for ear pain, rhinorrhea and sore throat.    Eyes:  Negative for pain and visual disturbance.   Respiratory:  Positive for shortness of breath. Negative for cough.    Cardiovascular:  Positive for chest pain. Negative for palpitations.   Gastrointestinal:  Negative for abdominal pain, constipation, diarrhea, nausea and vomiting.   Genitourinary:   Negative for dysuria and hematuria.   Musculoskeletal:  Negative for arthralgias, back pain, neck pain and neck stiffness.   Skin:  Negative for color change, rash and wound.   Neurological:  Positive for facial asymmetry and numbness. Negative for dizziness, seizures, syncope, weakness, light-headedness and headaches.   All other systems reviewed and are negative.      Physical Exam  ED Triage Vitals   Temperature Pulse Respirations Blood Pressure SpO2   01/08/24 1921 01/08/24 1921 01/08/24 1921 01/08/24 1921 01/08/24 1921   98.4 °F (36.9 °C) 94 18 164/96 100 %      Temp Source Heart Rate Source Patient Position - Orthostatic VS BP Location FiO2 (%)   01/08/24 1921 01/08/24 1921 01/08/24 1921 01/08/24 1921 --   Oral Monitor Sitting Right arm       Pain Score       01/08/24 2128       5             Orthostatic Vital Signs  Vitals:    01/08/24 1921 01/08/24 2128 01/09/24 0020 01/09/24 0100   BP: 164/96 128/81 127/77 131/77   Pulse: 94 62 57 (!) 53   Patient Position - Orthostatic VS: Sitting Sitting Sitting Lying       Physical Exam  Vitals and nursing note reviewed.   Constitutional:       General: He is not in acute distress.     Appearance: He is well-developed.   HENT:      Head: Normocephalic and atraumatic.      Nose: Nose normal. No congestion.      Mouth/Throat:      Pharynx: Oropharynx is clear.   Eyes:      Extraocular Movements: Extraocular movements intact.      Conjunctiva/sclera: Conjunctivae normal.      Pupils: Pupils are equal, round, and reactive to light.   Cardiovascular:      Rate and Rhythm: Normal rate and regular rhythm.      Pulses: Normal pulses.           Carotid pulses are 2+ on the right side and 2+ on the left side.       Radial pulses are 2+ on the right side and 2+ on the left side.        Dorsalis pedis pulses are 2+ on the right side and 2+ on the left side.        Posterior tibial pulses are 2+ on the right side and 2+ on the left side.      Heart sounds: Normal heart sounds. No  murmur heard.  Pulmonary:      Effort: Pulmonary effort is normal. No respiratory distress.      Breath sounds: Normal breath sounds. No decreased breath sounds, wheezing, rhonchi or rales.   Chest:      Chest wall: No tenderness.   Abdominal:      General: Abdomen is flat. Bowel sounds are normal. There is no distension.      Palpations: Abdomen is soft.      Tenderness: There is no abdominal tenderness. There is no right CVA tenderness or left CVA tenderness.   Musculoskeletal:         General: No deformity or signs of injury. Normal range of motion.      Cervical back: Normal range of motion and neck supple. No rigidity or tenderness.      Right lower leg: No tenderness. No edema.      Left lower leg: No tenderness. No edema.   Skin:     General: Skin is warm and dry.      Findings: No bruising, lesion or rash.   Neurological:      General: No focal deficit present.      Mental Status: He is alert and oriented to person, place, and time.      Cranial Nerves: No cranial nerve deficit.      Sensory: No sensory deficit.      Motor: No weakness.         ED Medications  Medications   acetaminophen (TYLENOL) tablet 650 mg (has no administration in time range)   nicotine (NICODERM CQ) 21 mg/24 hr TD 24 hr patch 1 patch (has no administration in time range)   atorvastatin (LIPITOR) tablet 40 mg (40 mg Oral Given 1/9/24 0103)   heparin (porcine) subcutaneous injection 5,000 Units (has no administration in time range)   insulin lispro (HumaLOG) 100 units/mL subcutaneous injection 1-6 Units (has no administration in time range)   insulin lispro (HumaLOG) 100 units/mL subcutaneous injection 1-5 Units ( Subcutaneous Not Given 1/9/24 0121)   aspirin (ECOTRIN LOW STRENGTH) EC tablet 81 mg (has no administration in time range)   aspirin chewable tablet 324 mg (324 mg Oral Given 1/9/24 0104)       Diagnostic Studies  Results Reviewed       Procedure Component Value Units Date/Time    Lipid Panel with Direct LDL reflex [[id number]]   (Abnormal) Collected: 01/09/24 0416    Lab Status: Final result Specimen: Blood from Arm, Right Updated: 01/09/24 0450     Cholesterol 123 mg/dL      Triglycerides 176 mg/dL      HDL, Direct 40 mg/dL      LDL Calculated 48 mg/dL     Basic metabolic panel [[id number]]  (Abnormal) Collected: 01/09/24 0416    Lab Status: Final result Specimen: Blood from Arm, Right Updated: 01/09/24 0450     Sodium 136 mmol/L      Potassium 3.3 mmol/L      Chloride 103 mmol/L      CO2 27 mmol/L      ANION GAP 6 mmol/L      BUN 7 mg/dL      Creatinine 0.72 mg/dL      Glucose 121 mg/dL      Calcium 9.0 mg/dL      eGFR 111 ml/min/1.73sq m     Narrative:      National Kidney Disease Foundation guidelines for Chronic Kidney Disease (CKD):     Stage 1 with normal or high GFR (GFR > 90 mL/min/1.73 square meters)    Stage 2 Mild CKD (GFR = 60-89 mL/min/1.73 square meters)    Stage 3A Moderate CKD (GFR = 45-59 mL/min/1.73 square meters)    Stage 3B Moderate CKD (GFR = 30-44 mL/min/1.73 square meters)    Stage 4 Severe CKD (GFR = 15-29 mL/min/1.73 square meters)    Stage 5 End Stage CKD (GFR <15 mL/min/1.73 square meters)  Note: GFR calculation is accurate only with a steady state creatinine    CBC (With Platelets) [[id number]]  (Abnormal) Collected: 01/09/24 0416    Lab Status: Final result Specimen: Blood from Arm, Right Updated: 01/09/24 0431     WBC 8.13 Thousand/uL      RBC 4.70 Million/uL      Hemoglobin 12.2 g/dL      Hematocrit 38.2 %      MCV 81 fL      MCH 26.0 pg      MCHC 31.9 g/dL      RDW 15.8 %      Platelets 271 Thousands/uL      MPV 9.7 fL     Fingerstick Glucose (POCT) [[id number]]  (Normal) Collected: 01/09/24 0058    Lab Status: Final result Updated: 01/09/24 0114     POC Glucose 120 mg/dl     TSH, 3rd generation with Free T4 reflex [[id number]]  (Normal) Collected: 01/08/24 1929    Lab Status: Final result Specimen: Blood from Arm, Right Updated: 01/08/24 2308     TSH 3RD GENERATON 0.799 uIU/mL     HS Troponin I 2hr [[id number]]   (Normal) Collected: 01/08/24 2127    Lab Status: Final result Specimen: Blood from Arm, Right Updated: 01/08/24 2207     hs TnI 2hr 2 ng/L      Delta 2hr hsTnI -1 ng/L     HS Troponin 0hr (reflex protocol) [[id number]]  (Normal) Collected: 01/08/24 1929    Lab Status: Final result Specimen: Blood from Arm, Right Updated: 01/08/24 2002     hs TnI 0hr 3 ng/L     Comprehensive metabolic panel [[id number]]  (Abnormal) Collected: 01/08/24 1929    Lab Status: Final result Specimen: Blood from Arm, Right Updated: 01/08/24 1955     Sodium 138 mmol/L      Potassium 3.4 mmol/L      Chloride 101 mmol/L      CO2 29 mmol/L      ANION GAP 8 mmol/L      BUN 7 mg/dL      Creatinine 0.88 mg/dL      Glucose 117 mg/dL      Calcium 9.4 mg/dL      AST 18 U/L      ALT 24 U/L      Alkaline Phosphatase 85 U/L      Total Protein 7.7 g/dL      Albumin 4.7 g/dL      Total Bilirubin 0.33 mg/dL      eGFR 102 ml/min/1.73sq m     Narrative:      National Kidney Disease Foundation guidelines for Chronic Kidney Disease (CKD):     Stage 1 with normal or high GFR (GFR > 90 mL/min/1.73 square meters)    Stage 2 Mild CKD (GFR = 60-89 mL/min/1.73 square meters)    Stage 3A Moderate CKD (GFR = 45-59 mL/min/1.73 square meters)    Stage 3B Moderate CKD (GFR = 30-44 mL/min/1.73 square meters)    Stage 4 Severe CKD (GFR = 15-29 mL/min/1.73 square meters)    Stage 5 End Stage CKD (GFR <15 mL/min/1.73 square meters)  Note: GFR calculation is accurate only with a steady state creatinine    CBC and differential [[id number]]  (Abnormal) Collected: 01/08/24 1929    Lab Status: Final result Specimen: Blood from Arm, Right Updated: 01/08/24 1941     WBC 10.77 Thousand/uL      RBC 4.94 Million/uL      Hemoglobin 12.9 g/dL      Hematocrit 40.1 %      MCV 81 fL      MCH 26.1 pg      MCHC 32.2 g/dL      RDW 15.8 %      MPV 9.9 fL      Platelets 305 Thousands/uL      nRBC 0 /100 WBCs      Neutrophils Relative 67 %      Immat GRANS % 0 %      Lymphocytes Relative 24 %       Monocytes Relative 7 %      Eosinophils Relative 1 %      Basophils Relative 1 %      Neutrophils Absolute 7.22 Thousands/µL      Immature Grans Absolute 0.04 Thousand/uL      Lymphocytes Absolute 2.58 Thousands/µL      Monocytes Absolute 0.78 Thousand/µL      Eosinophils Absolute 0.06 Thousand/µL      Basophils Absolute 0.09 Thousands/µL                    CT head without contrast   Final Result by Pavel Velazquez DO (01/09 0019)      No acute intracranial abnormality.                  Workstation performed: [id number]         XR chest 1 view portable   ED Interpretation by Dennis Montana MD (01/08 2235)   My personal interpretation-no acute cardiopulmonary disease appreciated.      MRI Inpatient Order    (Results Pending)         Procedures  ECG 12 Lead Documentation Only    Date/Time: 1/9/2024 5:23 AM    Performed by: Dennis Montana MD  Authorized by: Dennis Montana MD    Patient location:  ED  Previous ECG:     Previous ECG:  Compared to current    Similarity:  No change  Interpretation:     Interpretation: normal    Rate:     ECG rate:  97    ECG rate assessment: normal    Rhythm:     Rhythm: sinus rhythm    Ectopy:     Ectopy: none    QRS:     QRS axis:  Normal    QRS intervals:  Normal  Conduction:     Conduction: normal    ST segments:     ST segments:  Normal  T waves:     T waves: normal          ED Course  ED Course as of 01/09/24 0525   Mon Jan 08, 2024 2233 48-year-old male presenting due to chest pain, left-sided body numbness and shortness of breath.  All symptoms have resolved at this time, concerning for TIA.  Cardiac workup unremarkable at this time.  Well score 0, patient able to be perked out.  At this time will get a CT of the head, check a TSH and plan to have patient admitted on stroke pathway for MRI.   2234 Vitals reviewed, mild hypertensive and borderline tachycardic on arrival but on recheck, blood pressure and pulse have improved.   2234 CBC and differential(!)  Mild leukocytosis   2234  Comprehensive metabolic panel(!)  Mild hypokalemia   2234 hs TnI 0hr: 3  WNL   2234 Delta 2hr hsTnI: -1  WNL   2235 XR chest 1 view portable  My personal interpretation-no acute cardiopulmonary disease appreciated.   2348 TSH 3RD GENERATON: 0.799  WNL   Tue Jan 09, 2024   0023 CT head without contrast  No acute intracranial abnormality.        0024 Patient informed of plan to admit on stroke pathway.  Discussed with inpatient team, obs on stroke pathway.  Workup otherwise unremarkable, vitals unremarkable.  Patient stable at this time and remains asymptomatic at this time.             HEART Risk Score      Flowsheet Row Most Recent Value   Heart Score Risk Calculator    History 1 Filed at: 01/09/2024 0524   ECG 0 Filed at: 01/09/2024 0524   Age 1 Filed at: 01/09/2024 0524   Risk Factors 1 Filed at: 01/09/2024 0524   Troponin 0 Filed at: 01/09/2024 0524   HEART Score 3 Filed at: 01/09/2024 0524                PERC Rule for PE      Flowsheet Row Most Recent Value   PERC Rule for PE    Age >=50 0 Filed at: 01/08/2024 2230   HR >=100 0 Filed at: 01/08/2024 2230   O2 Sat on room air < 95% 0 Filed at: 01/08/2024 2230   History of PE or DVT 0 Filed at: 01/08/2024 2230   Recent trauma or surgery 0 Filed at: 01/08/2024 2230   Hemoptysis 0 Filed at: 01/08/2024 2230   Exogenous estrogen 0 Filed at: 01/08/2024 2230   Unilateral leg swelling 0 Filed at: 01/08/2024 2230   PERC Rule for PE Results 0 Filed at: 01/08/2024 2230                SBIRT 22yo+      Flowsheet Row Most Recent Value   Initial Alcohol Screen: US AUDIT-C     1. How often do you have a drink containing alcohol? 6 Filed at: 01/08/2024 2323   2. How many drinks containing alcohol do you have on a typical day you are drinking?  0 Filed at: 01/08/2024 2323   3a. Male UNDER 65: How often do you have five or more drinks on one occasion? 0 Filed at: 01/08/2024 2323   3b. FEMALE Any Age, or MALE 65+: How often do you have 4 or more drinks on one occassion? 0 Filed at:  01/08/2024 2323   Audit-C Score 6 Filed at: 01/08/2024 2323   ZANA: How many times in the past year have you...    Used an illegal drug or used a prescription medication for non-medical reasons? Never Filed at: 01/08/2024 2323          ETTA Risk Score      Flowsheet Row Most Recent Value   Age >= 65 0 Filed at: 01/09/2024 0206   Known CAD (stenosis >= 50%) 0 Filed at: 01/09/2024 0206   Recent (<=24 hrs) Service Angina 0 Filed at: 01/09/2024 0206   ST Deviation >= 0.5 mm 0 Filed at: 01/09/2024 0206   3+ CAD Risk Factors (FHx, HTN, HLP, DM, Smoker) 1 Filed at: 01/09/2024 0206   Aspirin Use Past 7 Days 1 Filed at: 01/09/2024 0206   Elevated Cardiac Markers 0 Filed at: 01/09/2024 0206   ETTA Risk Score (Calculated) 2 Filed at: 01/09/2024 0206          Wells' Criteria for PE      Flowsheet Row Most Recent Value   Wells' Criteria for PE    Clinical signs and symptoms of DVT 0 Filed at: 01/08/2024 2229   PE is primary diagnosis or equally likely 0 Filed at: 01/08/2024 2229   HR >100 0 Filed at: 01/08/2024 2229   Immobilization at least 3 days or Surgery in the previous 4 weeks 0 Filed at: 01/08/2024 2229   Previous, objectively diagnosed PE or DVT 0 Filed at: 01/08/2024 2229   Hemoptysis 0 Filed at: 01/08/2024 2229   Malignancy with treatment within 6 months or palliative 0 Filed at: 01/08/2024 2229   Wells' Criteria Total 0 Filed at: 01/08/2024 2229              Medical Decision Making  Amount and/or Complexity of Data Reviewed  Labs: ordered. Decision-making details documented in ED Course.  Radiology: ordered and independent interpretation performed. Decision-making details documented in ED Course.    Risk  Decision regarding hospitalization.          Disposition  Final diagnoses:   TIA (transient ischemic attack)     Time reflects when diagnosis was documented in both MDM as applicable and the Disposition within this note       Time User Action Codes Description Comment    1/9/2024 12:31 AM Dennis Montana [G45.9]  TIA (transient ischemic attack)     1/9/2024 12:41 AM Ashutosh Belgica Add [R29.90] Stroke-like symptoms           ED Disposition       ED Disposition   Admit    Condition   Stable    Date/Time   Tue Jan 9, 2024 0031    Comment                  Follow-up Information    None         Patient's Medications   Discharge Prescriptions    No medications on file     No discharge procedures on file.    PDMP Review         Value Time User    PDMP Reviewed  Yes 1/8/2024 10:05 PM Castro Belcher MD             ED Provider  Attending physically available and evaluated Laron Gordon. I managed the patient along with the ED Attending.    Electronically Signed by           Dennis Montana MD  01/09/24 0525

## 2024-01-09 NOTE — ASSESSMENT & PLAN NOTE
Left sided numbness, left chest pain, left facial droop.    CT: no acute intracranial abnormality   CTA: not obtained in ED as numbness and facial droop resolved.  MRI brain negative  Patient is ambulatory in ED  Neurology consult appreciated  Patient okay for discharge home

## 2024-01-09 NOTE — SPEECH THERAPY NOTE
"Speech Language/Pathology  Speech/Language Pathology  Assessment    Patient Name: Laron Gordon  Today's Date: 1/9/2024     Problem List  Principal Problem:    Stroke-like symptoms  Active Problems:    Alcohol use    Hypertension    Type 2 diabetes mellitus (HCC)    Chest pain    Nicotine dependence    Past Medical History  Past Medical History:   Diagnosis Date    Chest pain 1/12/2021    Diabetes mellitus (HCC)     Exposure to SARS-associated coronavirus 10/5/2020    Hypertension     Viral upper respiratory tract infection 10/7/2020     Past Surgical History  History reviewed. No pertinent surgical history.    Laron Gordon is a 48 y.o. male with a PMH of HTN, HLD, DM, nicotine abuse,  who presents with left sided chest pain, left neck pain, left sided numbness, left facial droop that began around 4 pm.  He reports symptoms worsened around 7 pm, prompting ED evaluation.  He continues to have left chest pain described as a \"pinching\" sensation.  Troponin and EKG negative.  He does report shoveling a lot of snow yesterday.  Regarding left sided numbness and facial droop, this is now resolved.  He reports some left lower extremity numbness due to lying in the stretcher.  He underwent CT head without contrast which did not show any acute abnormality.  Concern for TIA given risk factors.  Admitted for stroke work up.     Consult received for speech/swallow eval on stroke pathway.  Pt passed nsg swallow screen; tolerating regular diet w/o s/s dysphagia or aspiration. No speech/language deficits reported. NIH score is 0.    MRI: 1/9/24 No acute intracranial pathology.     No need for formal speech/swallow eval at this time. Reconsult if needed.    Summer Palomo MA CCC-SLP  Speech Pathologist  Available via  tiger text   "

## 2024-01-09 NOTE — OCCUPATIONAL THERAPY NOTE
Occupational Therapy Screen     01/09/24 1035   OT Last Visit   OT Visit Date 01/09/24   Note Type   Note type Screen   Additional Comments OT consult received. Chart reviewed. RN reports pt to be independent. Contact made with pt and role of services explained to pt whom then confirms self to be (I) with no OT  needs. Pt agreeable to be removed from OT caseload. Please reconsult OT services should pt have a change in status. Thank you.     Javan Ho, OT

## 2024-01-09 NOTE — ASSESSMENT & PLAN NOTE
Lab Results   Component Value Date    HGBA1C 6.0 12/14/2023     Resume metformin on dc   Accucheck, ssi while inpt

## 2024-01-09 NOTE — CONSULTS
"Consultation - Neurology   Laron Gordon 48 y.o. male MRN: [id number]  Unit/Bed#: ED-42 Encounter: [id number]      Assessment/Plan     * Transient left sided paresthesias  Assessment & Plan  48 y.o. male with HTN, DM2, and tobacco use who presented to [facility] on 1/8/2024 due to chest pain and SOB followed by left neck/arm/leg paresthesias.  Of note, he has had similar symptoms in the past in the setting of chest pain.    Upon arrival to the ED, /96.  CT head negative for acute intracranial abnormalities.  CMP, CBC, troponin, and TSH unremarkable.    MRI brain negative for acute infarct.    On exam today, paresthesias have resolved, but continues to have mild chest discomfort.  Etiology for symptoms remains unclear, but unlikely TIA as he has had the exact same symptoms several times before.  (Per chart review, since 2021, he has had 13 ED visits for chest pain associated with intermittent left sided paresthesias).  Possibly musculoskeletal.    Plan:  - Echo completed; final report pending  - Continue home aspirin 81 mg daily  - Continue home Lipitor 20 mg QHS  - PT/OT  - Continue to monitor and notify neurology with any changes.   - Medical management and supportive care per primary team. Correction of any metabolic or infectious disturbances    Chest pain  Assessment & Plan  - Presented with \"squeezing\" left sided chest pain  - Troponins WNL  - EKG with normal sinus rhythm  - He has had several prior admissions for chest pain         Laron Gordon will not need outpatient follow up with Neurology. He will not require outpatient neurological testing.    History of Present Illness     Reason for Consult / Principal Problem: stroke-like symptoms  Hx and PE limited by: N/A  HPI: Laron Gordon is a 48 y.o. male with HTN, DM2, and tobacco use who presented to [facility] on 1/8/2024 due to chest pain, left-sided numbness, and shortness of breath.    History obtained per patient and chart review.  Yesterday, " "patient was at work when he started having \"squeezing\" left-sided chest pain that began around 4 PM.  At 5 PM, he developed left arm/leg/neck \"pins-and-needles\" sensation, but no associated weakness.  His coworker said that he had a \"droopy face\" but did not say which side and patient did not appreciate any facial weakness.    Of note, patient has been seen several times over the last few years for left-sided chest pain that radiates to the left side of the neck and left arm associate with tingling on the left upper extremity and occasionally in the left lower extremity.    Upon arrival to the ED, /96.  CT head negative for acute intracranial abnormalities.  CMP, CBC, troponin, and TSH unremarkable.    On exam today, patient states that he is still having mild left-sided chest discomfort, but the left-sided paresthesias resolved overnight.  Denies any headache, visual disturbances, numbness, tingling, arm/leg weakness, shortness of breath, or abdominal pain.  He smokes 1/2 pack of cigarettes per day x 30 years.  He has a history of heavy alcohol use, but has limited his alcohol consumption since May 2023.  He smokes marijuana daily, but denies any other illicit drug use.    Inpatient consult to Neurology  Consult performed by: Altagracia Guzman PA-C  Consult ordered by: LUZ Liang          Review of Systems   Respiratory:  Positive for chest tightness.    Cardiovascular:  Positive for chest pain.   Neurological:  Negative for numbness (resolved).   All other systems reviewed and are negative.      Historical Information   Past Medical History:   Diagnosis Date    Chest pain 1/12/2021    Diabetes mellitus (HCC)     Exposure to SARS-associated coronavirus 10/5/2020    Hypertension     Viral upper respiratory tract infection 10/7/2020     History reviewed. No pertinent surgical history.  Social History   Social History     Substance and Sexual Activity   Alcohol Use Yes    Comment: 1-2 drinks daily "     Social History     Substance and Sexual Activity   Drug Use Never     E-Cigarette/Vaping    E-Cigarette Use Never User      E-Cigarette/Vaping Substances    Nicotine No     THC No     CBD No      Social History     Tobacco Use   Smoking Status Every Day    Current packs/day: 1.00    Average packs/day: 0.5 packs/day for 28.9 years (14.5 ttl pk-yrs)    Types: Cigarettes    Start date: 2/18/1995    Passive exposure: Past   Smokeless Tobacco Never     Family History:   Family History   Problem Relation Age of Onset    Diabetes Mother     Diabetes Father        Review of previous medical records was completed. Reviewed prior notes, labs, CT head, prior CTA head/neck    Meds/Allergies   all current active meds have been reviewed, current meds:   Current Facility-Administered Medications   Medication Dose Route Frequency    acetaminophen (TYLENOL) tablet 650 mg  650 mg Oral Q6H PRN    [START ON 1/10/2024] aspirin (ECOTRIN LOW STRENGTH) EC tablet 81 mg  81 mg Oral Daily    atorvastatin (LIPITOR) tablet 40 mg  40 mg Oral QPM    heparin (porcine) subcutaneous injection 5,000 Units  5,000 Units Subcutaneous Q8H HOLEGR    insulin lispro (HumaLOG) 100 units/mL subcutaneous injection 1-5 Units  1-5 Units Subcutaneous HS    insulin lispro (HumaLOG) 100 units/mL subcutaneous injection 1-6 Units  1-6 Units Subcutaneous TID AC    nicotine (NICODERM CQ) 21 mg/24 hr TD 24 hr patch 1 patch  1 patch Transdermal Daily   , and PTA meds:   Prior to Admission Medications   Prescriptions Last Dose Informant Patient Reported? Taking?   Blood Glucose Monitoring Suppl (ONE TOUCH ULTRA 2) w/Device KIT   No No   Sig: Use 3 (three) times a day   Blood Pressure Monitoring (Blood Pressure Monitor/M Cuff) MISC   No No   Sig: Use 2 (two) times a day   Lancets (onetouch ultrasoft) lancets   No No   Sig: Use as instructed   amLODIPine (NORVASC) 10 mg tablet   No No   Sig: Take 1 tablet (10 mg total) by mouth daily   aspirin (ECOTRIN LOW STRENGTH) 81 mg  EC tablet   No No   Sig: Take 1 tablet (81 mg total) by mouth daily   atorvastatin (LIPITOR) 20 mg tablet   No No   Sig: Take 1 tablet (20 mg total) by mouth daily   glucose blood (OneTouch Ultra) test strip   No No   Sig: Use as instructed   ketorolac (TORADOL) 10 mg tablet   No No   Sig: Take 1 tablet (10 mg total) by mouth every 6 (six) hours as needed for moderate pain for up to 3 days   magnesium gluconate (MAGONATE) 500 mg tablet   No No   Sig: Take 1 tablet (500 mg total) by mouth daily   Patient not taking: Reported on 12/14/2023   metFORMIN (GLUCOPHAGE-XR) 500 mg 24 hr tablet   No No   Sig: Take 1 tablet (500 mg total) by mouth daily with lunch   pantoprazole (PROTONIX) 40 mg tablet   No No   Sig: TAKE 1 TABLET(40 MG) BY MOUTH TWICE DAILY BEFORE MEALS   Patient not taking: Reported on 7/20/2022   terbinafine (LamISIL) 1 % cream   No No   Sig: Apply topically 2 (two) times a day      Facility-Administered Medications: None       No Known Allergies    Objective   Vitals:Blood pressure 119/67, pulse (!) 53, temperature 98.4 °F (36.9 °C), temperature source Oral, resp. rate 16, weight 77.2 kg (170 lb 3.1 oz), SpO2 98%.,Body mass index is 22.77 kg/m².  No intake or output data in the 24 hours ending 01/09/24 0748    Invasive Devices:   Invasive Devices       Peripheral Intravenous Line  Duration             Peripheral IV 01/08/24 Right Antecubital <1 day                    Physical Exam  Vitals and nursing note reviewed.   Constitutional:       Appearance: Normal appearance.      Comments: Patient lying comfortably in bed in no acute distress   HENT:      Head: Normocephalic and atraumatic.      Mouth/Throat:      Mouth: Mucous membranes are moist.      Pharynx: Oropharynx is clear.   Eyes:      Extraocular Movements: Extraocular movements intact.      Conjunctiva/sclera: Conjunctivae normal.      Pupils: Pupils are equal, round, and reactive to light.   Pulmonary:      Effort: Pulmonary effort is normal.    Musculoskeletal:         General: Normal range of motion.   Skin:     General: Skin is warm and dry.   Neurological:      General: No focal deficit present.      Mental Status: He is alert and oriented to person, place, and time.      Deep Tendon Reflexes:      Reflex Scores:       Bicep reflexes are 2+ on the right side and 2+ on the left side.       Brachioradialis reflexes are 2+ on the right side and 2+ on the left side.       Patellar reflexes are 2+ on the right side and 2+ on the left side.       Achilles reflexes are 2+ on the right side and 2+ on the left side.     Comments: See full neuro exam below       Neurologic Exam     Mental Status   Oriented to person, place, and time.   Patient awake and alert.  Oriented to person, place, month, and year.  No dysarthria or aphasia.  Able to follow simple midline and appendicular commands.     Cranial Nerves     CN III, IV, VI   Pupils are equal, round, and reactive to light.  Pupils 3 mm, round, reactive to light bilaterally.  EOMs intact without nystagmus.  No visual field deficits.  Facial sensation to light touch intact throughout.  Facial expressions full and symmetric.  Tongue midline.  Soft palate raises symmetrically.  Full strength of sternocleidomastoid and trapezius muscles   Hearing grossly intact.     Motor Exam   Muscle bulk: normal  Overall muscle tone: normal  Right arm pronator drift: absent  Left arm pronator drift: absent  5/5 strength in bilateral upper and lower extremities.     Sensory Exam   Sensation to light touch, temperature, and vibration intact throughout.     Gait, Coordination, and Reflexes     Reflexes   Right brachioradialis: 2+  Left brachioradialis: 2+  Right biceps: 2+  Left biceps: 2+  Right patellar: 2+  Left patellar: 2+  Right achilles: 2+  Left achilles: 2+  No ataxia with finger to nose bilaterally  No resting or action tremor  No ankle clonus bilaterally  Downgoing toes bilaterally       Lab Results: I have personally  "reviewed pertinent reports.  , CBC:   Results from last 7 days   Lab Units 01/09/24  0416 01/08/24  1929   WBC Thousand/uL 8.13 10.77*   RBC Million/uL 4.70 4.94   HEMOGLOBIN g/dL 12.2 12.9   HEMATOCRIT % 38.2 40.1   MCV fL 81* 81*   PLATELETS Thousands/uL 271 305   , BMP/CMP:   Results from last 7 days   Lab Units 01/09/24  0416 01/08/24  1929   SODIUM mmol/L 136 138   POTASSIUM mmol/L 3.3* 3.4*   CHLORIDE mmol/L 103 101   CO2 mmol/L 27 29   BUN mg/dL 7 7   CREATININE mg/dL 0.72 0.88   CALCIUM mg/dL 9.0 9.4   AST U/L  --  18   ALT U/L  --  24   ALK PHOS U/L  --  85   EGFR ml/min/1.73sq m 111 102   , Vitamin B12:   , HgBA1C:   , TSH:   Results from last 7 days   Lab Units 01/08/24 1929   TSH 3RD GENERATON uIU/mL 0.799   , Coagulation:   , Lipid Profile:   Results from last 7 days   Lab Units 01/09/24 0416   HDL mg/dL 40   LDL CALC mg/dL 48   TRIGLYCERIDES mg/dL 176*   , Ammonia:   , Urinalysis:       Invalid input(s): \"URIBILINOGEN\", Drug Screen:   , Medication Drug Levels:       Invalid input(s): \"CARBAMAZEPINE\", \"LACOSAMIDE\", \"OXCARBAZEPINE\"  Imaging Studies: I have personally reviewed pertinent reports.   and I have personally reviewed pertinent films in PACS  EKG, Pathology, and Other Studies: I have personally reviewed pertinent reports.   and I have personally reviewed pertinent films in PACS  VTE Prophylaxis: Sequential compression device (Venodyne)  and Heparin    Code Status: Level 1 - Full Code  Advance Directive and Living Will:      Power of :    POLST:      Counseling / Coordination of Care  I have spent a total time of 61 minutes on 01/09/24 in caring for this patient including Diagnostic results, Prognosis, Risks and benefits of tx options, Instructions for management, Patient and family education, Importance of tx compliance, Risk factor reductions, Impressions, Counseling / Coordination of care, Documenting in the medical record, Reviewing / ordering tests, medicine, procedures  , " Obtaining or reviewing history  , and Communicating with other healthcare professionals .

## 2024-01-09 NOTE — ASSESSMENT & PLAN NOTE
"- Presented with \"squeezing\" left sided chest pain  - Troponins WNL  - EKG with normal sinus rhythm  - He has had several prior admissions for chest pain   "

## 2024-01-09 NOTE — DISCHARGE SUMMARY
[facility]  Discharge- Laron Gordon 1975, 48 y.o. male MRN: [id number]  Unit/Bed#: ED-42 Encounter: [id number]  Primary Care Provider: Treva Pollock MD   Date and time admitted to hospital: 1/8/2024  8:17 PM    Nicotine dependence  Assessment & Plan  1 ppd  Cessation   NRT    Chest pain  Assessment & Plan  Left sided chest pain since 4 pm.  Describes as pinching sensation.    Suspect msk.  Shoveling snow yesterday.   Troponin x 2 negative   Initial EKG: NSR   Patient had numerous ED visit with chest pain for last 2 years  However cardiac workup has been negative  Start PPI twice daily  Establish with GI for EGD outpatient    Type 2 diabetes mellitus (HCC)  Assessment & Plan  Lab Results   Component Value Date    HGBA1C 6.0 12/14/2023     Resume metformin on dc     Hypertension  Assessment & Plan  Resume amlodipine on discharge    Alcohol use  Assessment & Plan  1-2 drinks daily    * Transient left sided paresthesias  Assessment & Plan  Left sided numbness, left chest pain, left facial droop.    CT: no acute intracranial abnormality   CTA: not obtained in ED as numbness and facial droop resolved.  MRI brain negative  Patient is ambulatory in ED  Neurology consult appreciated  Patient okay for discharge home      Discharging Physician / Practitioner: Shadi Sandoval MD  PCP: Treva Pollock MD  Admission Date:   Admission Orders (From admission, onward)       Ordered        01/09/24 0032  Place in Observation  Once                          Discharge Date: 01/09/24    Medical Problems       Resolved Problems  Date Reviewed: 1/9/2024   None         Reason for Admission: TIA    Hospital Course:     Laron Gordon is a 48 y.o. male patient who originally presented to the hospital on 1/8/2024 48-year-old male with PMH of HTN, prediabetes, numerous previous ED visit in last 2 years for chest pain/epigastric pain, presented with left-sided numbness, left chest pain and left-sided  "facial droop.  In ER, all symptoms have resolved.  CT head was negative for abnormality.  Patient placed on stroke pathway.  Patient's symptoms have resolved completely.  MRI brain has been negative.  Patient has been ambulatory in ED.  Discussed with neurology, cleared for discharge       Please see above list of diagnoses and related plan for additional information.     Condition at Discharge: good        Vitals: Blood Pressure: 132/80 (01/09/24 0930)  Pulse: (!) 53 (01/09/24 0930)  Temperature: 98.4 °F (36.9 °C) (01/08/24 1921)  Temp Source: Oral (01/08/24 1921)  Respirations: 16 (01/09/24 0748)  Height: 5' 11\" (180.3 cm) (01/09/24 0930)  Weight - Scale: 77.1 kg (170 lb) (01/09/24 0930)  SpO2: 98 % (01/09/24 0748)       Discharge instructions/Information to patient and family:   See after visit summary for information provided to patient and family.      Provisions for Follow-Up Care:  See after visit summary for information related to follow-up care and any pertinent home health orders.      Disposition:     Home       Planned Readmission: no     Discharge Statement:  I spent 45 minutes discharging the patient. This time was spent on the day of discharge. I had direct contact with the patient on the day of discharge. Greater than 50% of the total time was spent examining patient, answering all patient questions, arranging and discussing plan of care with patient as well as directly providing post-discharge instructions.  Additional time then spent on discharge activities.    Discharge Medications:  See after visit summary for reconciled discharge medications provided to patient and family.      ** Please Note: This note has been constructed using a voice recognition system **    "

## 2024-01-10 ENCOUNTER — TRANSITIONAL CARE MANAGEMENT (OUTPATIENT)
Dept: INTERNAL MEDICINE CLINIC | Facility: CLINIC | Age: 49
End: 2024-01-10

## 2024-01-10 DIAGNOSIS — E11.9 TYPE 2 DIABETES MELLITUS WITHOUT COMPLICATION, WITHOUT LONG-TERM CURRENT USE OF INSULIN (HCC): ICD-10-CM

## 2024-01-10 DIAGNOSIS — E88.810 METABOLIC SYNDROME: ICD-10-CM

## 2024-01-10 DIAGNOSIS — I10 HYPERTENSION, UNSPECIFIED TYPE: ICD-10-CM

## 2024-01-10 LAB
ATRIAL RATE: 97 BPM
P AXIS: 44 DEGREES
PR INTERVAL: 128 MS
QRS AXIS: 75 DEGREES
QRSD INTERVAL: 88 MS
QT INTERVAL: 362 MS
QTC INTERVAL: 459 MS
T WAVE AXIS: 69 DEGREES
VENTRICULAR RATE: 97 BPM

## 2024-01-10 RX ORDER — LANCETS
EACH MISCELLANEOUS
Qty: 100 EACH | Refills: 1 | Status: SHIPPED | OUTPATIENT
Start: 2024-01-10

## 2024-01-10 RX ORDER — METFORMIN HYDROCHLORIDE 500 MG/1
500 TABLET, EXTENDED RELEASE ORAL
Qty: 90 TABLET | Refills: 0 | Status: SHIPPED | OUTPATIENT
Start: 2024-01-10

## 2024-01-10 RX ORDER — BLOOD SUGAR DIAGNOSTIC
STRIP MISCELLANEOUS
Qty: 100 EACH | Refills: 1 | Status: SHIPPED | OUTPATIENT
Start: 2024-01-10

## 2024-01-10 RX ORDER — ATORVASTATIN CALCIUM 20 MG/1
20 TABLET, FILM COATED ORAL DAILY
Qty: 90 TABLET | Refills: 0 | Status: SHIPPED | OUTPATIENT
Start: 2024-01-10

## 2024-01-10 RX ORDER — AMLODIPINE BESYLATE 10 MG/1
10 TABLET ORAL DAILY
Qty: 90 TABLET | Refills: 0 | Status: SHIPPED | OUTPATIENT
Start: 2024-01-10 | End: 2024-04-09

## 2024-01-12 ENCOUNTER — OFFICE VISIT (OUTPATIENT)
Dept: INTERNAL MEDICINE CLINIC | Facility: CLINIC | Age: 49
End: 2024-01-12
Payer: COMMERCIAL

## 2024-01-12 ENCOUNTER — PATIENT OUTREACH (OUTPATIENT)
Dept: OTHER | Facility: CLINIC | Age: 49
End: 2024-01-12

## 2024-01-12 VITALS
WEIGHT: 166 LBS | HEIGHT: 72 IN | SYSTOLIC BLOOD PRESSURE: 160 MMHG | HEART RATE: 84 BPM | DIASTOLIC BLOOD PRESSURE: 98 MMHG | OXYGEN SATURATION: 99 % | BODY MASS INDEX: 22.48 KG/M2 | RESPIRATION RATE: 16 BRPM | TEMPERATURE: 97.6 F

## 2024-01-12 DIAGNOSIS — E16.2 HYPOGLYCEMIA: ICD-10-CM

## 2024-01-12 DIAGNOSIS — I10 PRIMARY HYPERTENSION: ICD-10-CM

## 2024-01-12 DIAGNOSIS — F41.9 ANXIETY: ICD-10-CM

## 2024-01-12 DIAGNOSIS — F17.210 CIGARETTE NICOTINE DEPENDENCE WITHOUT COMPLICATION: ICD-10-CM

## 2024-01-12 DIAGNOSIS — B35.1 ONYCHOMYCOSIS: ICD-10-CM

## 2024-01-12 DIAGNOSIS — E55.9 VITAMIN D DEFICIENCY: ICD-10-CM

## 2024-01-12 DIAGNOSIS — R20.2 PARESTHESIAS: Primary | ICD-10-CM

## 2024-01-12 PROCEDURE — 99214 OFFICE O/P EST MOD 30 MIN: CPT

## 2024-01-12 RX ORDER — MELATONIN
1000 DAILY
Qty: 90 TABLET | Refills: 0 | Status: SHIPPED | OUTPATIENT
Start: 2024-01-12

## 2024-01-12 RX ORDER — PRENATAL VIT 91/IRON/FOLIC/DHA 28-975-200
COMBINATION PACKAGE (EA) ORAL 2 TIMES DAILY
Qty: 12 G | Refills: 0 | Status: SHIPPED | OUTPATIENT
Start: 2024-01-12

## 2024-01-12 RX ORDER — PRENATAL VIT 91/IRON/FOLIC/DHA 28-975-200
COMBINATION PACKAGE (EA) ORAL 2 TIMES DAILY
Qty: 12 G | Refills: 0 | Status: SHIPPED | OUTPATIENT
Start: 2024-01-12 | End: 2024-01-12

## 2024-01-12 NOTE — ASSESSMENT & PLAN NOTE
BP elevated in office today, however pt has not taken AM amlodipine, and recent Bps have been better controlled. Will not change medications at this time, and instructed patient to measure BP at home and log results.

## 2024-01-12 NOTE — ASSESSMENT & PLAN NOTE
No recurrence of sxs since recent admission. Neuro consulted during admission, w low suspicion for CVA/TIA, recommended continuation of PTA ASA and Lipitor.

## 2024-01-12 NOTE — ASSESSMENT & PLAN NOTE
Pt previously prescribed terbinafine for onychomycosis, however states he was unable to fill. Will re-prescribe now.

## 2024-01-12 NOTE — PROGRESS NOTES
Assessment & Plan     1. Transient left sided paresthesias  Assessment & Plan:  No recurrence of sxs since recent admission. Neuro consulted during admission, w low suspicion for CVA/TIA, recommended continuation of PTA ASA and Lipitor.       2. Anxiety  Assessment & Plan:  Pt w concern for anxiety/panic attacks contributing to recurrent non-cardiac CP    Orders:  -     Ambulatory referral to Psych Services; Future    3. Cigarette nicotine dependence without complication  Assessment & Plan:  Current smoker interested in quitting, has found NRT insufficient and is interested in medication.    Agreeable with referral to smoking cessation program.    Orders:  -     Ambulatory Referral to Smoking Cessation Program; Future    4. Hypoglycemia, Self-reported  Assessment & Plan:  Pt reports one episode of hypoglycemia, as noted in the HPI. Denies history of prior, none during hospitalization, and reports no episodes since. At this time, suspect measurement error rather than true hypoglycemia, as pt is T2DM on metformin 500 mg QD, however, instructed patient to call clinic for any subsequent episodes of hypoglycemia, as this would prompt further w/u.      5. Primary hypertension  Assessment & Plan:  BP elevated in office today, however pt has not taken AM amlodipine, and recent Bps have been better controlled. Will not change medications at this time, and instructed patient to measure BP at home and log results.      6. Onychomycosis  Assessment & Plan:  Pt previously prescribed terbinafine for onychomycosis, however states he was unable to fill. Will re-prescribe now.    Orders:  -     terbinafine (LamISIL) 1 % cream; Apply topically 2 (two) times a day    7. Vitamin D deficiency  -     cholecalciferol (VITAMIN D3) 1,000 units tablet; Take 1 tablet (1,000 Units total) by mouth daily         Subjective     Transitional Care Management Review:   Laron Gordon is a 48 y.o. male here for TCM follow up.     During the TCM phone  call patient stated:  TCM Call       Date and time call was made  1/10/2024 11:15 AM    Hospital care reviewed  Records reviewed    Patient was hospitialized at  [facility]    Date of Admission  01/08/24    Date of discharge  01/09/24    Diagnosis  nonintractable hemiplegic migraine    Disposition  Home    Were the patients medications reviewed and updated  Yes    Current Symptoms  None          TCM Call       Post hospital issues  None    Should patient be enrolled in anticoag monitoring?  No    Scheduled for follow up?  Yes    Referrals needed  Gastroenterologist    Did you obtain your prescribed medications  Yes    Do you need help managing your prescriptions or medications  No    Is transportation to your appointment needed  No    I have advised the patient to call PCP with any new or worsening symptoms  Sheila Rust LPN    Living Arrangements  Family members    Support System  Family    The type of support provided  Emotional    Do you have social support  Yes, some    Are you recieving any outpatient services  No    Are you recieving home care services  No    Are you using any community resources  No    Current waiver services  No    Have you fallen in the last 12 months  No    Interperter language line needed  No    Comments  DNSA 1/23/24. Appointment scheduled for 1/12/2024 with PCP          Mr. Gordon has a past medical history that includes HTN treated with amlodipine and T2DM treated with metformin 500 mg QD and was recently admitted from 01/08/24-01/09/24 after presenting for sympotms of L-sided numbness, L facial droop, and L chest pain that began at work and resolved in or prior to arrival in the ED. Physical exam in ED unremarkable. Multiple presentations for CP with negative cardiac w/u, found to have chest-wall tenderness on exam. Pt admitted on stroke pathway, with negative MRI brain, low suspicion of TIA per neuro.  In clinic this morning, pt continues to endorse 4/10 L-sided CP  described as the sensation of someone pinching his L nipple. He has marked tenderness to palpation of the L pectoral. Denies further L sided parasthesias or L-sided facial droop. He believes that his recurrent symptoms may be related to panic attacks and would appreciate referral to Psych.    At this time, he reports that recent episode was preceded by feeling unwell at work, sat down, believes he lost consciousness and was awakened by a coworker. Does not believe he fell asleep. States that he checked his blood sugar and found it to be low at 20, which has never happened before. No clear cause for hypoglycemia, as he reports a normal diet that day and is only on metformin 500 mg daily, with which he reports good compliance. States that BG typically runs 100-120, and HgbA1c checked during hospitalization of 6.0. BG unremarkable during hospitalization, no hypoglycemia. Denies prior LOC.    BP is noted to be high in clinic this morning, however pt states that he did not take morning amlodipine before coming to clinic.    Discussed smoking cessation, and patient is interested in quitting, however has found NRT to be insufficient and would like to explore medication options.      Review of Systems   Constitutional:  Positive for unexpected weight change (No significant wt loss. Pt reports 4-6 pounds unintentional decrease in wt). Negative for activity change, appetite change, chills, diaphoresis, fatigue and fever.   HENT: Negative.     Respiratory: Negative.     Cardiovascular:  Positive for chest pain. Negative for palpitations and leg swelling.   Gastrointestinal:  Positive for abdominal pain. Negative for constipation, diarrhea, nausea and vomiting.   Genitourinary:  Negative for decreased urine volume, difficulty urinating, dysuria, frequency and urgency.   Musculoskeletal:  Negative for arthralgias and myalgias.   Skin: Negative.    Neurological:  Negative for dizziness, weakness, light-headedness, numbness and  headaches.        ?LOC prior to recent admission?   Psychiatric/Behavioral:  The patient is nervous/anxious.        Objective     /98 (BP Location: Left arm, Patient Position: Sitting, Cuff Size: Adult)   Pulse 84   Temp 97.6 °F (36.4 °C) (Tympanic)   Resp 16   Ht 6' (1.829 m)   Wt 75.3 kg (166 lb)   SpO2 99%   BMI 22.51 kg/m²      Physical Exam  Vitals reviewed.   Constitutional:       General: He is not in acute distress.     Appearance: Normal appearance. He is not ill-appearing, toxic-appearing or diaphoretic.   HENT:      Head: Normocephalic and atraumatic.   Eyes:      General: No scleral icterus.        Right eye: No discharge.         Left eye: No discharge.      Conjunctiva/sclera: Conjunctivae normal.      Pupils: Pupils are equal, round, and reactive to light.   Cardiovascular:      Rate and Rhythm: Normal rate and regular rhythm.      Pulses: Normal pulses.      Heart sounds: Normal heart sounds. No murmur heard.     No friction rub. No gallop.   Pulmonary:      Effort: Pulmonary effort is normal. No respiratory distress.      Breath sounds: Normal breath sounds. No stridor. No wheezing, rhonchi or rales.   Chest:      Chest wall: Tenderness (L pectoral markedly tender to palpation) present.   Abdominal:      General: Bowel sounds are normal. There is no distension.      Palpations: Abdomen is soft.      Tenderness: There is abdominal tenderness (mild diffuse tenderness  to palpation). There is no guarding or rebound.   Musculoskeletal:         General: No swelling, tenderness, deformity or signs of injury.      Right lower leg: No edema.      Left lower leg: No edema.   Skin:     General: Skin is warm and dry.      Coloration: Skin is not jaundiced or pale.   Neurological:      Mental Status: He is alert.   Psychiatric:         Mood and Affect: Mood normal.         Behavior: Behavior normal.       Medications have been reviewed by provider in current encounter    Joshua Lomeli MD

## 2024-01-12 NOTE — ASSESSMENT & PLAN NOTE
Pt reports one episode of hypoglycemia, as noted in the HPI. Denies history of prior, none during hospitalization, and reports no episodes since. At this time, suspect measurement error rather than true hypoglycemia, as pt is T2DM on metformin 500 mg QD, however, instructed patient to call clinic for any subsequent episodes of hypoglycemia, as this would prompt further w/u.

## 2024-01-12 NOTE — ASSESSMENT & PLAN NOTE
Current smoker interested in quitting, has found NRT insufficient and is interested in medication.    Agreeable with referral to smoking cessation program.

## 2024-01-23 ENCOUNTER — TELEPHONE (OUTPATIENT)
Age: 49
End: 2024-01-23

## 2024-01-29 ENCOUNTER — TELEPHONE (OUTPATIENT)
Dept: PSYCHIATRY | Facility: CLINIC | Age: 49
End: 2024-01-29

## 2024-01-29 NOTE — TELEPHONE ENCOUNTER
Contacted patient in regards to Routine Referral in attempts to verify patient's needs of services and add patient to proper wait list. LVM for patient to contact intake dept  in regards to referral.

## 2024-03-26 ENCOUNTER — APPOINTMENT (EMERGENCY)
Dept: RADIOLOGY | Facility: HOSPITAL | Age: 49
End: 2024-03-26
Payer: COMMERCIAL

## 2024-03-26 ENCOUNTER — HOSPITAL ENCOUNTER (EMERGENCY)
Facility: HOSPITAL | Age: 49
Discharge: HOME | End: 2024-03-26
Attending: EMERGENCY MEDICINE
Payer: COMMERCIAL

## 2024-03-26 VITALS
HEART RATE: 76 BPM | TEMPERATURE: 98.5 F | DIASTOLIC BLOOD PRESSURE: 74 MMHG | SYSTOLIC BLOOD PRESSURE: 146 MMHG | RESPIRATION RATE: 16 BRPM | OXYGEN SATURATION: 97 %

## 2024-03-26 DIAGNOSIS — R07.89 CHEST WALL PAIN: Primary | ICD-10-CM

## 2024-03-26 LAB
2HR DELTA HS TROPONIN: >0 NG/L
ALBUMIN SERPL BCP-MCNC: 4.6 G/DL (ref 3.5–5)
ALP SERPL-CCNC: 96 U/L (ref 34–104)
ALT SERPL W P-5'-P-CCNC: 20 U/L (ref 7–52)
ANION GAP SERPL CALCULATED.3IONS-SCNC: 8 MMOL/L (ref 4–13)
AST SERPL W P-5'-P-CCNC: 20 U/L (ref 13–39)
BASOPHILS # BLD AUTO: 0.08 THOUSANDS/ÂΜL (ref 0–0.1)
BASOPHILS NFR BLD AUTO: 1 % (ref 0–1)
BILIRUB SERPL-MCNC: 0.41 MG/DL (ref 0.2–1)
BUN SERPL-MCNC: 9 MG/DL (ref 5–25)
CALCIUM SERPL-MCNC: 9.5 MG/DL (ref 8.4–10.2)
CARDIAC TROPONIN I PNL SERPL HS: 2 NG/L
CARDIAC TROPONIN I PNL SERPL HS: <2 NG/L
CHLORIDE SERPL-SCNC: 100 MMOL/L (ref 96–108)
CK SERPL-CCNC: 121 U/L (ref 39–308)
CO2 SERPL-SCNC: 28 MMOL/L (ref 21–32)
CREAT SERPL-MCNC: 1.11 MG/DL (ref 0.6–1.3)
D DIMER PPP FEU-MCNC: <0.27 UG/ML FEU
EOSINOPHIL # BLD AUTO: 0.06 THOUSAND/ÂΜL (ref 0–0.61)
EOSINOPHIL NFR BLD AUTO: 1 % (ref 0–6)
ERYTHROCYTE [DISTWIDTH] IN BLOOD BY AUTOMATED COUNT: 15.9 % (ref 11.6–15.1)
GFR SERPL CREATININE-BSD FRML MDRD: 78 ML/MIN/1.73SQ M
GLUCOSE SERPL-MCNC: 152 MG/DL (ref 65–140)
HCT VFR BLD AUTO: 40.3 % (ref 36.5–49.3)
HGB BLD-MCNC: 12.8 G/DL (ref 12–17)
IMM GRANULOCYTES # BLD AUTO: 0.02 THOUSAND/UL (ref 0–0.2)
IMM GRANULOCYTES NFR BLD AUTO: 0 % (ref 0–2)
LIPASE SERPL-CCNC: 32 U/L (ref 11–82)
LYMPHOCYTES # BLD AUTO: 2.59 THOUSANDS/ÂΜL (ref 0.6–4.47)
LYMPHOCYTES NFR BLD AUTO: 35 % (ref 14–44)
MCH RBC QN AUTO: 26.7 PG (ref 26.8–34.3)
MCHC RBC AUTO-ENTMCNC: 31.8 G/DL (ref 31.4–37.4)
MCV RBC AUTO: 84 FL (ref 82–98)
MONOCYTES # BLD AUTO: 0.84 THOUSAND/ÂΜL (ref 0.17–1.22)
MONOCYTES NFR BLD AUTO: 12 % (ref 4–12)
NEUTROPHILS # BLD AUTO: 3.74 THOUSANDS/ÂΜL (ref 1.85–7.62)
NEUTS SEG NFR BLD AUTO: 51 % (ref 43–75)
NRBC BLD AUTO-RTO: 0 /100 WBCS
PLATELET # BLD AUTO: 309 THOUSANDS/UL (ref 149–390)
PMV BLD AUTO: 9.6 FL (ref 8.9–12.7)
POTASSIUM SERPL-SCNC: 3.6 MMOL/L (ref 3.5–5.3)
PROT SERPL-MCNC: 7.8 G/DL (ref 6.4–8.4)
RBC # BLD AUTO: 4.8 MILLION/UL (ref 3.88–5.62)
SODIUM SERPL-SCNC: 136 MMOL/L (ref 135–147)
WBC # BLD AUTO: 7.33 THOUSAND/UL (ref 4.31–10.16)

## 2024-03-26 PROCEDURE — 82550 ASSAY OF CK (CPK): CPT | Performed by: PHYSICIAN ASSISTANT

## 2024-03-26 PROCEDURE — 99285 EMERGENCY DEPT VISIT HI MDM: CPT

## 2024-03-26 PROCEDURE — 85025 COMPLETE CBC W/AUTO DIFF WBC: CPT | Performed by: EMERGENCY MEDICINE

## 2024-03-26 PROCEDURE — 80053 COMPREHEN METABOLIC PANEL: CPT | Performed by: EMERGENCY MEDICINE

## 2024-03-26 PROCEDURE — 83690 ASSAY OF LIPASE: CPT | Performed by: PHYSICIAN ASSISTANT

## 2024-03-26 PROCEDURE — 85379 FIBRIN DEGRADATION QUANT: CPT | Performed by: PHYSICIAN ASSISTANT

## 2024-03-26 PROCEDURE — 96361 HYDRATE IV INFUSION ADD-ON: CPT

## 2024-03-26 PROCEDURE — 71045 X-RAY EXAM CHEST 1 VIEW: CPT

## 2024-03-26 PROCEDURE — 99285 EMERGENCY DEPT VISIT HI MDM: CPT | Performed by: PHYSICIAN ASSISTANT

## 2024-03-26 PROCEDURE — 93005 ELECTROCARDIOGRAM TRACING: CPT

## 2024-03-26 PROCEDURE — 84484 ASSAY OF TROPONIN QUANT: CPT | Performed by: EMERGENCY MEDICINE

## 2024-03-26 PROCEDURE — 96374 THER/PROPH/DIAG INJ IV PUSH: CPT

## 2024-03-26 PROCEDURE — 36415 COLL VENOUS BLD VENIPUNCTURE: CPT

## 2024-03-26 RX ORDER — KETOROLAC TROMETHAMINE 30 MG/ML
15 INJECTION, SOLUTION INTRAMUSCULAR; INTRAVENOUS ONCE
Status: COMPLETED | OUTPATIENT
Start: 2024-03-26 | End: 2024-03-26

## 2024-03-26 RX ADMIN — SODIUM CHLORIDE 1000 ML: 0.9 INJECTION, SOLUTION INTRAVENOUS at 20:12

## 2024-03-26 RX ADMIN — KETOROLAC TROMETHAMINE 15 MG: 30 INJECTION, SOLUTION INTRAMUSCULAR; INTRAVENOUS at 20:12

## 2024-03-26 NOTE — ED PROVIDER NOTES
"History  Chief Complaint   Patient presents with    Chest Pain     Pt reports he was working with sheet metal when he started with gradual onset of chest pain, L side numbness approx 1 hour ago. Pt reports L sided headache, blurry vision, SOB, dry mouth, abd pain. Denies trouble urinating.      Patient is a 48-year-old male with a history of diabetes mellitus, hypertension, and no significant past surgical history that presents to the emergency department with improved dull left-sided chest pain with radiation to left side of neck approximately 1 hour ago.  Patient denies associated symptoms.  Patient states that he was at work and when he was moving some sheet-metal, he heard a \"pop in his left side of his chest companied by some dullness, and numbness.  Patient denies history of stroke.  Patient denies history of MI.  Patient states that he takes all prescribed medications as directed.  Patient denies long periods of inactivity.  Patient does state that he is a cigarette smoker \"has 2-3 pulls of marijuana cigarettes daily.\"  Patient denies palliative factors with provocative factors of pressure to left side of chest.  Patient denies not effective treatment.  Patient denies fevers, chills, nausea, vomiting, diarrhea, constipation and urinary symptoms.  Patient denies recent fall and recent trauma.  Patient denies sick contacts and recent travel.  Patient denies shortness of breath, and abdominal pain.      History provided by:  Patient   used: No    Chest Pain  Associated symptoms: no abdominal pain, no back pain, no cough, no dizziness, no fever, no headache, no nausea, no numbness, no palpitations, no shortness of breath, not vomiting and no weakness        Prior to Admission Medications   Prescriptions Last Dose Informant Patient Reported? Taking?   Blood Glucose Monitoring Suppl (ONE TOUCH ULTRA 2) w/Device KIT   No No   Sig: Use 3 (three) times a day   Blood Pressure Monitoring (Blood " Pressure Monitor/M Cuff) MISC   No No   Sig: Use 2 (two) times a day   Lancets (onetouch ultrasoft) lancets   No No   Sig: Use as instructed   amLODIPine (NORVASC) 10 mg tablet   No No   Sig: Take 1 tablet (10 mg total) by mouth daily   aspirin (ECOTRIN LOW STRENGTH) 81 mg EC tablet   No No   Sig: Take 1 tablet (81 mg total) by mouth daily   atorvastatin (LIPITOR) 20 mg tablet   No No   Sig: Take 1 tablet (20 mg total) by mouth daily   cholecalciferol (VITAMIN D3) 1,000 units tablet   No No   Sig: Take 1 tablet (1,000 Units total) by mouth daily   glucose blood (OneTouch Ultra) test strip   No No   Sig: Use as instructed   metFORMIN (GLUCOPHAGE-XR) 500 mg 24 hr tablet   No No   Sig: Take 1 tablet (500 mg total) by mouth daily with lunch   pantoprazole (PROTONIX) 40 mg tablet   No No   Sig: Take 1 tablet (40 mg total) by mouth 2 (two) times a day   terbinafine (LamISIL) 1 % cream   No No   Sig: Apply topically 2 (two) times a day      Facility-Administered Medications: None       Past Medical History:   Diagnosis Date    Chest pain 1/12/2021    Diabetes mellitus (HCC)     Exposure to SARS-associated coronavirus 10/5/2020    Hypertension     Viral upper respiratory tract infection 10/7/2020       History reviewed. No pertinent surgical history.    Family History   Problem Relation Age of Onset    Diabetes Mother     Diabetes Father      I have reviewed and agree with the history as documented.    E-Cigarette/Vaping    E-Cigarette Use Never User      E-Cigarette/Vaping Substances    Nicotine No     THC No     CBD No      Social History     Tobacco Use    Smoking status: Every Day     Current packs/day: 1.00     Average packs/day: 0.5 packs/day for 29.1 years (14.7 ttl pk-yrs)     Types: Cigarettes     Start date: 2/18/1995     Passive exposure: Past    Smokeless tobacco: Never   Vaping Use    Vaping status: Never Used   Substance Use Topics    Alcohol use: Not Currently     Comment: stopped alcohol    Drug use: Never        Review of Systems   Constitutional:  Negative for activity change, appetite change, chills and fever.   HENT:  Negative for congestion, ear pain, postnasal drip, rhinorrhea, sinus pressure, sinus pain, sore throat and tinnitus.    Eyes:  Negative for photophobia, pain and visual disturbance.   Respiratory:  Negative for cough, chest tightness and shortness of breath.    Cardiovascular:  Positive for chest pain. Negative for palpitations.   Gastrointestinal:  Negative for abdominal pain, constipation, diarrhea, nausea and vomiting.   Genitourinary:  Negative for difficulty urinating, dysuria, flank pain, frequency, hematuria and urgency.   Musculoskeletal:  Negative for arthralgias, back pain, gait problem, neck pain and neck stiffness.   Skin:  Negative for color change, pallor and rash.   Allergic/Immunologic: Negative for environmental allergies and food allergies.   Neurological:  Negative for dizziness, seizures, syncope, weakness, numbness and headaches.   Psychiatric/Behavioral:  Negative for confusion.    All other systems reviewed and are negative.      Physical Exam  Physical Exam  Vitals and nursing note reviewed.   Constitutional:       General: He is awake.      Appearance: Normal appearance. He is well-developed and normal weight. He is not ill-appearing, toxic-appearing or diaphoretic.      Comments: BP (!) 182/106 (BP Location: Right arm)   Pulse 102   Temp 98.5 °F (36.9 °C) (Oral)   Resp 18   SpO2 98%      HENT:      Head: Normocephalic and atraumatic.      Jaw: There is normal jaw occlusion.      Right Ear: Hearing, tympanic membrane and external ear normal. No decreased hearing noted. No drainage, swelling or tenderness. No mastoid tenderness.      Left Ear: Hearing, tympanic membrane and external ear normal. No decreased hearing noted. No drainage, swelling or tenderness. No mastoid tenderness.      Nose: Nose normal.      Mouth/Throat:      Lips: Pink.      Mouth: Mucous membranes are  moist.      Pharynx: Oropharynx is clear. Uvula midline.   Eyes:      General: Lids are normal. Vision grossly intact. Gaze aligned appropriately.         Right eye: No discharge.         Left eye: No discharge.      Extraocular Movements: Extraocular movements intact.      Conjunctiva/sclera: Conjunctivae normal.      Pupils: Pupils are equal, round, and reactive to light.   Neck:      Vascular: No JVD.      Trachea: Trachea and phonation normal. No tracheal tenderness or tracheal deviation.   Cardiovascular:      Rate and Rhythm: Normal rate and regular rhythm.      Pulses: Normal pulses.           Radial pulses are 2+ on the right side and 2+ on the left side.        Posterior tibial pulses are 2+ on the right side and 2+ on the left side.      Heart sounds: Normal heart sounds.   Pulmonary:      Effort: Pulmonary effort is normal.      Breath sounds: Normal breath sounds and air entry. No stridor. No decreased breath sounds, wheezing, rhonchi or rales.   Chest:      Chest wall: Tenderness present.      Comments: Left-sided chest pain reproducible with palpation on left pectoralis major muscles, with left-sided chest pain symptoms easily reproduced with active range of motion of left inward rotation of humerus.    Passive ROM intact  Upper and lower extremity 5/5 bilaterally  Neurovascularly intact  No grinding or clicking of joints        Abdominal:      General: Abdomen is flat. Bowel sounds are normal. There is no distension.      Palpations: Abdomen is soft. Abdomen is not rigid.      Tenderness: There is no abdominal tenderness. There is no guarding or rebound.   Musculoskeletal:         General: Normal range of motion.      Cervical back: Full passive range of motion without pain, normal range of motion and neck supple. No rigidity. No spinous process tenderness or muscular tenderness. Normal range of motion.   Feet:      Right foot:      Toenail Condition: Right toenails are normal.      Left foot:       Toenail Condition: Left toenails are normal.   Lymphadenopathy:      Head:      Right side of head: No submental, submandibular, tonsillar, preauricular, posterior auricular or occipital adenopathy.      Left side of head: No submental, submandibular, tonsillar, preauricular, posterior auricular or occipital adenopathy.      Cervical: No cervical adenopathy.      Right cervical: No superficial, deep or posterior cervical adenopathy.     Left cervical: No superficial, deep or posterior cervical adenopathy.   Skin:     General: Skin is warm.      Capillary Refill: Capillary refill takes less than 2 seconds.      Findings: No rash.   Neurological:      General: No focal deficit present.      Mental Status: He is alert and oriented to person, place, and time. Mental status is at baseline.      GCS: GCS eye subscore is 4. GCS verbal subscore is 5. GCS motor subscore is 6.      Sensory: No sensory deficit.      Deep Tendon Reflexes: Reflexes are normal and symmetric.      Reflex Scores:       Patellar reflexes are 2+ on the right side and 2+ on the left side.  Psychiatric:         Attention and Perception: Attention normal.         Mood and Affect: Mood normal.         Speech: Speech normal.         Behavior: Behavior normal. Behavior is cooperative.         Thought Content: Thought content normal.         Judgment: Judgment normal.         Vital Signs  ED Triage Vitals   Temperature Pulse Respirations Blood Pressure SpO2   03/26/24 1943 03/26/24 1907 03/26/24 1907 03/26/24 1907 03/26/24 1907   98.5 °F (36.9 °C) 102 18 (!) 182/106 98 %      Temp Source Heart Rate Source Patient Position - Orthostatic VS BP Location FiO2 (%)   03/26/24 1943 03/26/24 1907 03/26/24 1907 03/26/24 1907 --   Oral Monitor Sitting Right arm       Pain Score       03/26/24 2012       5           Vitals:    03/26/24 1907 03/26/24 2013 03/26/24 2136   BP: (!) 182/106 159/85 146/74   Pulse: 102 82 76   Patient Position - Orthostatic VS: Sitting Lying  Lying         Visual Acuity      ED Medications  Medications   ketorolac (TORADOL) injection 15 mg (15 mg Intravenous Given 3/26/24 2012)   sodium chloride 0.9 % bolus 1,000 mL (0 mL Intravenous Stopped 3/26/24 2136)       Diagnostic Studies  Results Reviewed       Procedure Component Value Units Date/Time    D-Dimer [[id number]]  (Normal) Collected: 03/26/24 1918    Lab Status: Final result Specimen: Blood from Arm, Left Updated: 03/26/24 2221     D-Dimer, Quant <0.27 ug/ml FEU     HS Troponin I 2hr [[id number]]  (Normal) Collected: 03/26/24 2136    Lab Status: Final result Specimen: Blood from Arm, Left Updated: 03/26/24 2206     hs TnI 2hr 2 ng/L      Delta 2hr hsTnI >0 ng/L     CK [[id number]]  (Normal) Collected: 03/26/24 1918    Lab Status: Final result Specimen: Blood from Arm, Left Updated: 03/26/24 2025     Total  U/L     Lipase [[id number]]  (Normal) Collected: 03/26/24 1918    Lab Status: Final result Specimen: Blood from Arm, Left Updated: 03/26/24 2025     Lipase 32 u/L     HS Troponin 0hr (reflex protocol) [[id number]]  (Normal) Collected: 03/26/24 1918    Lab Status: Final result Specimen: Blood from Arm, Left Updated: 03/26/24 2004     hs TnI 0hr <2 ng/L     Comprehensive metabolic panel [[id number]]  (Abnormal) Collected: 03/26/24 1918    Lab Status: Final result Specimen: Blood from Arm, Left Updated: 03/26/24 1957     Sodium 136 mmol/L      Potassium 3.6 mmol/L      Chloride 100 mmol/L      CO2 28 mmol/L      ANION GAP 8 mmol/L      BUN 9 mg/dL      Creatinine 1.11 mg/dL      Glucose 152 mg/dL      Calcium 9.5 mg/dL      AST 20 U/L      ALT 20 U/L      Alkaline Phosphatase 96 U/L      Total Protein 7.8 g/dL      Albumin 4.6 g/dL      Total Bilirubin 0.41 mg/dL      eGFR 78 ml/min/1.73sq m     Narrative:      National Kid[employer] guidelines for Chronic Kidney Disease (CKD):     Stage 1 with normal or high GFR (GFR > 90 mL/min/1.73 square meters)    Stage 2 Mild CKD (GFR = 60-89  mL/min/1.73 square meters)    Stage 3A Moderate CKD (GFR = 45-59 mL/min/1.73 square meters)    Stage 3B Moderate CKD (GFR = 30-44 mL/min/1.73 square meters)    Stage 4 Severe CKD (GFR = 15-29 mL/min/1.73 square meters)    Stage 5 End Stage CKD (GFR <15 mL/min/1.73 square meters)  Note: GFR calculation is accurate only with a steady state creatinine    CBC and differential [[id number]]  (Abnormal) Collected: 03/26/24 1918    Lab Status: Final result Specimen: Blood from Arm, Left Updated: 03/26/24 1925     WBC 7.33 Thousand/uL      RBC 4.80 Million/uL      Hemoglobin 12.8 g/dL      Hematocrit 40.3 %      MCV 84 fL      MCH 26.7 pg      MCHC 31.8 g/dL      RDW 15.9 %      MPV 9.6 fL      Platelets 309 Thousands/uL      nRBC 0 /100 WBCs      Neutrophils Relative 51 %      Immature Grans % 0 %      Lymphocytes Relative 35 %      Monocytes Relative 12 %      Eosinophils Relative 1 %      Basophils Relative 1 %      Neutrophils Absolute 3.74 Thousands/µL      Absolute Immature Grans 0.02 Thousand/uL      Absolute Lymphocytes 2.59 Thousands/µL      Absolute Monocytes 0.84 Thousand/µL      Eosinophils Absolute 0.06 Thousand/µL      Basophils Absolute 0.08 Thousands/µL                    XR chest 1 view portable   ED Interpretation by Zach Martinez PA-C (03/26 2026)   Chest x-ray with no acute cardiopulmonary disease read by me                 Procedures  ECG 12 Lead Documentation Only    Date/Time: 3/26/2024 7:13 PM    Performed by: Zach Martinez PA-C  Authorized by: Zach Martinez PA-C    Indications / Diagnosis:  Left sided chest pain  ECG reviewed by me, the ED Provider: yes    Patient location:  ED  Previous ECG:     Previous ECG:  Compared to current    Comparison ECG info:  When compared with ECG of January 8, 2024, no significant changes are noted.    Similarity:  No change    Comparison to cardiac monitor: Yes    Interpretation:     Interpretation: normal    Rate:     ECG rate:  101    ECG rate assessment: tachycardic     Rhythm:     Rhythm: sinus tachycardia    Ectopy:     Ectopy: none    QRS:     QRS axis:  Normal    QRS intervals:  Normal  Conduction:     Conduction: normal    ST segments:     ST segments:  Normal  T waves:     T waves: normal    ECG 12 Lead Documentation Only    Date/Time: 3/26/2024 10:38 PM    Performed by: Zach Martinez PA-C  Authorized by: Zach Martinez PA-C    Indications / Diagnosis:  Chest pain  ECG reviewed by me, the ED Provider: yes    Patient location:  ED  Previous ECG:     Previous ECG:  Compared to current    Comparison ECG info:  When compared with ECG of March 26, 2024, 1913 no significant changes are noted    Similarity:  No change    Comparison to cardiac monitor: Yes    Interpretation:     Interpretation: normal    Rate:     ECG rate:  75    ECG rate assessment: normal    Rhythm:     Rhythm: sinus rhythm    Ectopy:     Ectopy: none    QRS:     QRS axis:  Normal    QRS intervals:  Normal  Conduction:     Conduction: normal    ST segments:     ST segments:  Normal  T waves:     T waves: normal             ED Course  ED Course as of 03/27/24 0707   Tue Mar 26, 2024   2256 Reevaluation of patient with patient GCS 15 alert and oriented x 3, no acute focal neurological deficits; patient also reports that he had complete abatement of chest pain symptoms status post Toradol delivery.  Patient with chest pain symptoms followed by tingling sensation on left upper arm and neck also abated status post Toradol delivery, strongly believe that patient initial chest pain symptoms are in fact musculoskeletal given mechanism             HEART Risk Score      Flowsheet Row Most Recent Value   Heart Score Risk Calculator    History 0 Filed at: 03/27/2024 0659   ECG 0 Filed at: 03/27/2024 0659   Age 0 Filed at: 03/27/2024 0659   Risk Factors 1 Filed at: 03/27/2024 0659   Troponin 0 Filed at: 03/27/2024 0659   HEART Score 1 Filed at: 03/27/2024 0659             Stroke Assessment       Row Name 03/26/24 1951              NIH Stroke Scale    Interval --      Level of Consciousness (1a.) 0      LOC Questions (1b.) 0      LOC Commands (1c.) 0      Best Gaze (2.) 0      Visual (3.) 0      Facial Palsy (4.) 0      Motor Arm, Left (5a.) 0      Motor Arm, Right (5b.) 0      Motor Leg, Left (6a.) 0      Motor Leg, Right (6b.) 0      Limb Ataxia (7.) 0      Sensory (8.) 0      Best Language (9.) 0      Dysarthria (10.) 0      Extinction and Inattention (11.) (Formerly Neglect) --      Total --                        PERC Rule for PE      Flowsheet Row Most Recent Value   PERC Rule for PE    Age >=50 0 Filed at: 03/26/2024 1950   HR >=100 1 Filed at: 03/26/2024 1950   O2 Sat on room air < 95% 0 Filed at: 03/26/2024 1950   History of PE or DVT 0 Filed at: 03/26/2024 1950   Recent trauma or surgery 0 Filed at: 03/26/2024 1950   Hemoptysis 0 Filed at: 03/26/2024 1950   Exogenous estrogen 0 Filed at: 03/26/2024 1950   Unilateral leg swelling 0 Filed at: 03/26/2024 1950   PERC Rule for PE Results 1 Filed at: 03/26/2024 1950                    Wells' Criteria for PE      Flowsheet Row Most Recent Value   Wells' Criteria for PE    Clinical signs and symptoms of DVT 0 Filed at: 03/26/2024 1950   PE is primary diagnosis or equally likely 0 Filed at: 03/26/2024 1950   HR >100 1.5 Filed at: 03/26/2024 1950   Immobilization at least 3 days or Surgery in the previous 4 weeks 0 Filed at: 03/26/2024 1950   Previous, objectively diagnosed PE or DVT 0 Filed at: 03/26/2024 1950   Hemoptysis 0 Filed at: 03/26/2024 1950   Malignancy with treatment within 6 months or palliative 0 Filed at: 03/26/2024 1950   Rasheed' Criteria Total 1.5 Filed at: 03/26/2024 1950                  Medical Decision Making  Patient is a 48-year-old male with a history of diabetes mellitus, hypertension, and no significant past surgical history that presents to the emergency department with improved dull left-sided chest pain with radiation to left side of neck approximately 1 hour  "ago.   Patient hemodynamically stable and afebrile  No sirs  Normal kidney function, normal electrolytes  No leukocytosis, no bandemia, doubt infection  Serial troponins negative, ECGs x 2 with first with sinus tachycardia and sinus rhythm, with no ischemic changes; heart score 1  Negative lipase, doubt acute pancreatitis  Normal kidney function, negative CK, doubt rhabdomyolysis  Positive Wells positive PERC, negative D-dimer doubt pulmonary embolism  Chest x-ray with no acute cardiopulmonary disease on initial read by me  GCS 15 alert and oriented x 3, no acute focal neurological deficits; performed secondary to triage note; doubt stroke symptomatology  Delivered Toradol in the emergency department; patient demonstrates decrease in presenting left sided chest pain ED symptomatology status post medication delivery  Ddx likely and not limited to pulmonary embolism, ACS, pleurisy, pneumonia, pneumothorax, muscular strain  Patient reports feeling a \"pop \"in left side of chest as he picked up a piece of sheet metal accompanied by left-sided chest pain symptoms; with patient reported complete abatement of left-sided chest pain symptomatology status post Toradol delivery, very likely musculoskeletal in origin.  Counseled patient on proper ergonomics/kinesthetic's of lifting and also counseled patient on taking over-the-counter ibuprofen/Motrin 400 mg mg every 6 hours as needed for left-sided chest pain symptoms only as needed  Follow-up with PCP  Follow up with emergency department if symptoms persist or exacerbate  Patient demonstrates verbal understanding of all clinical laboratory and imaging findings, discharge instructions, follow-up, and verbally agrees with current treatment plan with teach back    *Due to voice recognition software, sound alike and misspelled words may be contained in the documentation*      Amount and/or Complexity of Data Reviewed  Labs: ordered. Decision-making details documented in ED " Course.  Radiology: ordered and independent interpretation performed. Decision-making details documented in ED Course.  ECG/medicine tests: ordered and independent interpretation performed. Decision-making details documented in ED Course.    Risk  Prescription drug management.             Disposition  Final diagnoses:   Chest wall pain     Time reflects when diagnosis was documented in both MDM as applicable and the Disposition within this note       Time User Action Codes Description Comment    3/26/2024 10:41 PM Zach Martinez Jennifer [R07.89] Chest wall pain           ED Disposition       ED Disposition   Discharge    Condition   Stable    Date/Time   Tue Mar 26, 2024 2240    Comment   Laron Gordon discharge to home/self care.                   Follow-up Information       Follow up With Specialties Details Why Contact Info Additional Information    Treva Pollock MD Internal Medicine   [address] [place] [id number]  [phone]       [facility] Emergency Medicine   [address] [place] [address]  [phone] [facility], [address] [place] [address], [phone]            Discharge Medication List as of 3/26/2024 10:42 PM        CONTINUE these medications which have NOT CHANGED    Details   amLODIPine (NORVASC) 10 mg tablet Take 1 tablet (10 mg total) by mouth daily, Starting Wed 1/10/2024, Until Tue 4/9/2024, Normal      aspirin (ECOTRIN LOW STRENGTH) 81 mg EC tablet Take 1 tablet (81 mg total) by mouth daily, Starting Wed 1/10/2024, Normal      atorvastatin (LIPITOR) 20 mg tablet Take 1 tablet (20 mg total) by mouth daily, Starting Wed 1/10/2024, Normal      Blood Glucose Monitoring Suppl (ONE TOUCH ULTRA 2) w/Device KIT Use 3 (three) times a day, Starting Thu 12/14/2023, Normal      Blood Pressure Monitoring (Blood Pressure Monitor/M Cuff) MISC Use 2 (two) times a day, Starting Thu  12/14/2023, Normal      cholecalciferol (VITAMIN D3) 1,000 units tablet Take 1 tablet (1,000 Units total) by mouth daily, Starting Fri 1/12/2024, Normal      glucose blood (OneTouch Ultra) test strip Use as instructed, Normal      Lancets (onetouch ultrasoft) lancets Use as instructed, Normal      metFORMIN (GLUCOPHAGE-XR) 500 mg 24 hr tablet Take 1 tablet (500 mg total) by mouth daily with lunch, Starting Wed 1/10/2024, Normal      pantoprazole (PROTONIX) 40 mg tablet Take 1 tablet (40 mg total) by mouth 2 (two) times a day, Starting Tue 1/9/2024, Normal      terbinafine (LamISIL) 1 % cream Apply topically 2 (two) times a day, Starting Fri 1/12/2024, Normal             No discharge procedures on file.    PDMP Review         Value Time User    PDMP Reviewed  Yes 1/8/2024 10:05 PM Castro Belcher MD            ED Provider  Electronically Signed by             Zach Martinez PA-C  03/27/24 0707

## 2024-03-26 NOTE — Clinical Note
Laron Gordon was seen and treated in our emergency department on 3/26/2024.                Diagnosis:     Laron  .    He may return on this date:          If you have any questions or concerns, please don't hesitate to call.      Zach Martinez PA-C    ______________________________           _______________          _______________  Hospital Representative                              Date                                Time

## 2024-03-26 NOTE — Clinical Note
Laron Gordon was seen and treated in our emergency department on 3/26/2024.                Diagnosis:     Laron  .    He may return on this date: 03/29/2024         If you have any questions or concerns, please don't hesitate to call.      Luis Armando Coley MD    ______________________________           _______________          _______________  Hospital Representative                              Date                                Time

## 2024-03-31 LAB
ATRIAL RATE: 101 BPM
ATRIAL RATE: 75 BPM
P AXIS: 40 DEGREES
P AXIS: 48 DEGREES
PR INTERVAL: 140 MS
PR INTERVAL: 144 MS
QRS AXIS: 66 DEGREES
QRS AXIS: 72 DEGREES
QRSD INTERVAL: 88 MS
QRSD INTERVAL: 88 MS
QT INTERVAL: 362 MS
QT INTERVAL: 394 MS
QTC INTERVAL: 439 MS
QTC INTERVAL: 469 MS
T WAVE AXIS: 68 DEGREES
T WAVE AXIS: 74 DEGREES
VENTRICULAR RATE: 101 BPM
VENTRICULAR RATE: 75 BPM

## 2024-03-31 PROCEDURE — 93010 ELECTROCARDIOGRAM REPORT: CPT | Performed by: INTERNAL MEDICINE

## 2024-06-12 ENCOUNTER — HOSPITAL ENCOUNTER (EMERGENCY)
Facility: HOSPITAL | Age: 49
Discharge: HOME | End: 2024-06-13
Attending: EMERGENCY MEDICINE
Payer: COMMERCIAL

## 2024-06-12 ENCOUNTER — APPOINTMENT (EMERGENCY)
Dept: RADIOLOGY | Facility: HOSPITAL | Age: 49
End: 2024-06-12
Payer: COMMERCIAL

## 2024-06-12 DIAGNOSIS — R16.0 HEPATOMEGALY: ICD-10-CM

## 2024-06-12 DIAGNOSIS — R07.9 CHEST PAIN: Primary | ICD-10-CM

## 2024-06-12 DIAGNOSIS — R03.0 ELEVATED BLOOD PRESSURE READING: ICD-10-CM

## 2024-06-12 LAB
BASOPHILS # BLD AUTO: 0.04 THOUSANDS/ÂΜL (ref 0–0.1)
BASOPHILS NFR BLD AUTO: 1 % (ref 0–1)
EOSINOPHIL # BLD AUTO: 0.06 THOUSAND/ÂΜL (ref 0–0.61)
EOSINOPHIL NFR BLD AUTO: 1 % (ref 0–6)
ERYTHROCYTE [DISTWIDTH] IN BLOOD BY AUTOMATED COUNT: 14.9 % (ref 11.6–15.1)
HCT VFR BLD AUTO: 38.8 % (ref 36.5–49.3)
HGB BLD-MCNC: 12.2 G/DL (ref 12–17)
IMM GRANULOCYTES # BLD AUTO: 0.02 THOUSAND/UL (ref 0–0.2)
IMM GRANULOCYTES NFR BLD AUTO: 0 % (ref 0–2)
LYMPHOCYTES # BLD AUTO: 2.09 THOUSANDS/ÂΜL (ref 0.6–4.47)
LYMPHOCYTES NFR BLD AUTO: 30 % (ref 14–44)
MCH RBC QN AUTO: 26.2 PG (ref 26.8–34.3)
MCHC RBC AUTO-ENTMCNC: 31.4 G/DL (ref 31.4–37.4)
MCV RBC AUTO: 83 FL (ref 82–98)
MONOCYTES # BLD AUTO: 0.73 THOUSAND/ÂΜL (ref 0.17–1.22)
MONOCYTES NFR BLD AUTO: 11 % (ref 4–12)
NEUTROPHILS # BLD AUTO: 4.01 THOUSANDS/ÂΜL (ref 1.85–7.62)
NEUTS SEG NFR BLD AUTO: 57 % (ref 43–75)
NRBC BLD AUTO-RTO: 0 /100 WBCS
PLATELET # BLD AUTO: 271 THOUSANDS/UL (ref 149–390)
PMV BLD AUTO: 9.3 FL (ref 8.9–12.7)
RBC # BLD AUTO: 4.65 MILLION/UL (ref 3.88–5.62)
WBC # BLD AUTO: 6.95 THOUSAND/UL (ref 4.31–10.16)

## 2024-06-12 PROCEDURE — 71045 X-RAY EXAM CHEST 1 VIEW: CPT

## 2024-06-12 PROCEDURE — 93005 ELECTROCARDIOGRAM TRACING: CPT

## 2024-06-12 PROCEDURE — 36415 COLL VENOUS BLD VENIPUNCTURE: CPT

## 2024-06-12 PROCEDURE — 80053 COMPREHEN METABOLIC PANEL: CPT | Performed by: EMERGENCY MEDICINE

## 2024-06-12 PROCEDURE — 99285 EMERGENCY DEPT VISIT HI MDM: CPT

## 2024-06-12 PROCEDURE — 83690 ASSAY OF LIPASE: CPT

## 2024-06-12 PROCEDURE — 84484 ASSAY OF TROPONIN QUANT: CPT | Performed by: EMERGENCY MEDICINE

## 2024-06-12 PROCEDURE — 85025 COMPLETE CBC W/AUTO DIFF WBC: CPT | Performed by: EMERGENCY MEDICINE

## 2024-06-12 PROCEDURE — 99285 EMERGENCY DEPT VISIT HI MDM: CPT | Performed by: EMERGENCY MEDICINE

## 2024-06-13 ENCOUNTER — APPOINTMENT (EMERGENCY)
Dept: CT IMAGING | Facility: HOSPITAL | Age: 49
End: 2024-06-13
Payer: COMMERCIAL

## 2024-06-13 VITALS
SYSTOLIC BLOOD PRESSURE: 146 MMHG | DIASTOLIC BLOOD PRESSURE: 85 MMHG | HEART RATE: 56 BPM | RESPIRATION RATE: 18 BRPM | OXYGEN SATURATION: 99 % | TEMPERATURE: 98.3 F

## 2024-06-13 LAB
2HR DELTA HS TROPONIN: >1 NG/L
ALBUMIN SERPL BCP-MCNC: 4.3 G/DL (ref 3.5–5)
ALP SERPL-CCNC: 79 U/L (ref 34–104)
ALT SERPL W P-5'-P-CCNC: 17 U/L (ref 7–52)
ANION GAP SERPL CALCULATED.3IONS-SCNC: 6 MMOL/L (ref 4–13)
AST SERPL W P-5'-P-CCNC: 15 U/L (ref 13–39)
ATRIAL RATE: 54 BPM
ATRIAL RATE: 63 BPM
BILIRUB SERPL-MCNC: 0.32 MG/DL (ref 0.2–1)
BUN SERPL-MCNC: 10 MG/DL (ref 5–25)
CALCIUM SERPL-MCNC: 9.2 MG/DL (ref 8.4–10.2)
CARDIAC TROPONIN I PNL SERPL HS: 3 NG/L
CARDIAC TROPONIN I PNL SERPL HS: <2 NG/L
CHLORIDE SERPL-SCNC: 104 MMOL/L (ref 96–108)
CO2 SERPL-SCNC: 29 MMOL/L (ref 21–32)
CREAT SERPL-MCNC: 0.86 MG/DL (ref 0.6–1.3)
GFR SERPL CREATININE-BSD FRML MDRD: 102 ML/MIN/1.73SQ M
GLUCOSE SERPL-MCNC: 112 MG/DL (ref 65–140)
LIPASE SERPL-CCNC: 24 U/L (ref 11–82)
P AXIS: 43 DEGREES
P AXIS: 46 DEGREES
POTASSIUM SERPL-SCNC: 3.6 MMOL/L (ref 3.5–5.3)
PR INTERVAL: 138 MS
PR INTERVAL: 142 MS
PROT SERPL-MCNC: 7.4 G/DL (ref 6.4–8.4)
QRS AXIS: 64 DEGREES
QRS AXIS: 67 DEGREES
QRSD INTERVAL: 100 MS
QRSD INTERVAL: 86 MS
QT INTERVAL: 420 MS
QT INTERVAL: 452 MS
QTC INTERVAL: 428 MS
QTC INTERVAL: 429 MS
SODIUM SERPL-SCNC: 139 MMOL/L (ref 135–147)
T WAVE AXIS: 63 DEGREES
T WAVE AXIS: 64 DEGREES
VENTRICULAR RATE: 54 BPM
VENTRICULAR RATE: 63 BPM

## 2024-06-13 PROCEDURE — 93010 ELECTROCARDIOGRAM REPORT: CPT | Performed by: INTERNAL MEDICINE

## 2024-06-13 PROCEDURE — 74174 CTA ABD&PLVS W/CONTRAST: CPT

## 2024-06-13 PROCEDURE — 71275 CT ANGIOGRAPHY CHEST: CPT

## 2024-06-13 PROCEDURE — 93005 ELECTROCARDIOGRAM TRACING: CPT

## 2024-06-13 PROCEDURE — 36415 COLL VENOUS BLD VENIPUNCTURE: CPT | Performed by: EMERGENCY MEDICINE

## 2024-06-13 PROCEDURE — 96365 THER/PROPH/DIAG IV INF INIT: CPT

## 2024-06-13 PROCEDURE — 84484 ASSAY OF TROPONIN QUANT: CPT | Performed by: EMERGENCY MEDICINE

## 2024-06-13 RX ORDER — ACETAMINOPHEN 10 MG/ML
1000 INJECTION, SOLUTION INTRAVENOUS ONCE
Status: COMPLETED | OUTPATIENT
Start: 2024-06-13 | End: 2024-06-13

## 2024-06-13 RX ADMIN — IOHEXOL 80 ML: 350 INJECTION, SOLUTION INTRAVENOUS at 00:44

## 2024-06-13 RX ADMIN — DICLOFENAC SODIUM 2 G: 10 GEL TOPICAL at 00:41

## 2024-06-13 RX ADMIN — ACETAMINOPHEN 1000 MG: 10 INJECTION INTRAVENOUS at 00:41

## 2024-06-13 NOTE — ED PROVIDER NOTES
History  Chief Complaint   Patient presents with    Chest Pain     Cp started @ 1900 left sided chest pressure that radiated to neck than numbness down his left arm and leg. Blurred vision. Pt in triage denies cp and blurry vision. Pt was given 325 of ASA per ems. Hx of HTN not compliant with meds. Lifts heavy metal at work. Was lifting at time of pain      HPI    Laron Gordon is a 49 y.o. male with history of HTN, HLD, diabetes presenting for chest pain.    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 Pain did not radiate, no nausea, no vomiting, no diaphoresis, no back pain, no abdominal pain, no shortness of breath.  No precipitating symptoms.  Patient also developed pain in his bilateral neck and subjective numbness in his left arm. Tried to sit around to see if symptoms would go away but they did not.  Tried to start working again which also did not help pain.  Did not specifically exacerbate pain.  At about 10 30-11 patient decided to go to the ED. patient does smoke, has not been taking his antihypertensive medications.    Patient reports that he has had similar experiences back in March of this year, at that time workup was negative.  No history of MI, no history of stroke, no history of clot, no recent immobilization, no leg swelling, no hemoptysis, no cancer or cancer treatment.    Prior to Admission Medications   Prescriptions Last Dose Informant Patient Reported? Taking?   Blood Glucose Monitoring Suppl (ONE TOUCH ULTRA 2) w/Device KIT   No No   Sig: Use 3 (three) times a day   Blood Pressure Monitoring (Blood Pressure Monitor/M Cuff) MISC   No No   Sig: Use 2 (two) times a day   Lancets (onetouch ultrasoft) lancets   No No   Sig: Use as instructed   amLODIPine (NORVASC) 10 mg tablet   No No   Sig: Take 1 tablet (10 mg total) by mouth daily   aspirin (ECOTRIN LOW STRENGTH) 81 mg EC tablet   No No   Sig: Take 1 tablet  (81 mg total) by mouth daily   atorvastatin (LIPITOR) 20 mg tablet   No No   Sig: Take 1 tablet (20 mg total) by mouth daily   cholecalciferol (VITAMIN D3) 1,000 units tablet   No No   Sig: Take 1 tablet (1,000 Units total) by mouth daily   glucose blood (OneTouch Ultra) test strip   No No   Sig: Use as instructed   metFORMIN (GLUCOPHAGE-XR) 500 mg 24 hr tablet   No No   Sig: Take 1 tablet (500 mg total) by mouth daily with lunch   pantoprazole (PROTONIX) 40 mg tablet   No No   Sig: Take 1 tablet (40 mg total) by mouth 2 (two) times a day   terbinafine (LamISIL) 1 % cream   No No   Sig: Apply topically 2 (two) times a day      Facility-Administered Medications: None       Past Medical History:   Diagnosis Date    Chest pain 1/12/2021    Diabetes mellitus (HCC)     Exposure to SARS-associated coronavirus 10/5/2020    Hypertension     Viral upper respiratory tract infection 10/7/2020       History reviewed. No pertinent surgical history.    Family History   Problem Relation Age of Onset    Diabetes Mother     Diabetes Father      I have reviewed and agree with the history as documented.    E-Cigarette/Vaping    E-Cigarette Use Never User      E-Cigarette/Vaping Substances    Nicotine No     THC No     CBD No     Flavoring No     Other No     Unknown No      Social History     Tobacco Use    Smoking status: Every Day     Current packs/day: 1.00     Average packs/day: 0.5 packs/day for 29.3 years (14.9 ttl pk-yrs)     Types: Cigarettes     Start date: 2/18/1995     Passive exposure: Past    Smokeless tobacco: Never   Vaping Use    Vaping status: Never Used   Substance Use Topics    Alcohol use: Not Currently     Comment: socially    Drug use: Yes     Types: Marijuana        Review of Systems   Constitutional:  Negative for chills and fever.   HENT:  Negative for congestion, rhinorrhea and sore throat.    Eyes:  Negative for pain and visual disturbance.   Respiratory:  Negative for cough, chest tightness, shortness of  breath, wheezing and stridor.    Cardiovascular:  Positive for chest pain. Negative for palpitations and leg swelling.   Gastrointestinal:  Negative for abdominal pain, constipation, diarrhea, nausea and vomiting.   Genitourinary:  Negative for dysuria and hematuria.   Musculoskeletal:  Positive for neck pain. Negative for arthralgias and back pain.   Skin:  Negative for color change, pallor and rash.   Neurological:  Positive for numbness (Numbness and tingling in left arm). Negative for dizziness, syncope, light-headedness and headaches.   Psychiatric/Behavioral:  Negative for behavioral problems.    All other systems reviewed and are negative.      Physical Exam  ED Triage Vitals [06/12/24 2329]   Temperature Pulse Respirations Blood Pressure SpO2   98.3 °F (36.8 °C) 62 18 (!) 174/91 100 %      Temp Source Heart Rate Source Patient Position - Orthostatic VS BP Location FiO2 (%)   Oral Monitor Sitting Right arm --      Pain Score       No Pain             Orthostatic Vital Signs  Vitals:    06/12/24 2329 06/13/24 0030   BP: (!) 174/91 146/85   Pulse: 62 56   Patient Position - Orthostatic VS: Sitting        Physical Exam  Vitals and nursing note reviewed.   Constitutional:       General: He is not in acute distress.     Appearance: Normal appearance. He is well-developed. He is ill-appearing.   HENT:      Head: Normocephalic and atraumatic.      Nose: No rhinorrhea.      Mouth/Throat:      Mouth: Mucous membranes are moist.      Pharynx: Oropharynx is clear.   Eyes:      General: No visual field deficit.     Extraocular Movements: Extraocular movements intact.      Conjunctiva/sclera: Conjunctivae normal.      Pupils: Pupils are equal, round, and reactive to light.   Cardiovascular:      Rate and Rhythm: Normal rate and regular rhythm.      Pulses: Normal pulses.      Heart sounds: Normal heart sounds. No murmur heard.  Pulmonary:      Effort: Pulmonary effort is normal. No respiratory distress.      Breath  sounds: Normal breath sounds. No wheezing, rhonchi or rales.   Chest:      Chest wall: No tenderness.   Abdominal:      General: Abdomen is flat. Bowel sounds are normal. There is no distension.      Palpations: Abdomen is soft.      Tenderness: There is no abdominal tenderness. There is no right CVA tenderness, left CVA tenderness, guarding or rebound.   Musculoskeletal:      Cervical back: Neck supple.      Right lower leg: No edema.      Left lower leg: No edema.   Skin:     General: Skin is warm and dry.      Capillary Refill: Capillary refill takes less than 2 seconds.   Neurological:      General: No focal deficit present.      Mental Status: He is alert and oriented to person, place, and time.      Cranial Nerves: Cranial nerves 2-12 are intact. No cranial nerve deficit, dysarthria or facial asymmetry.      Sensory: Sensation is intact. No sensory deficit.      Motor: Motor function is intact. No weakness, tremor, abnormal muscle tone, seizure activity or pronator drift.      Coordination: Coordination is intact. Romberg sign negative. Finger-Nose-Finger Test and Heel to Shin Test normal. Rapid alternating movements normal.      Gait: Gait is intact. Gait normal.   Psychiatric:         Mood and Affect: Mood normal.         Behavior: Behavior normal.         ED Medications  Medications   acetaminophen (Ofirmev) injection 1,000 mg (0 mg Intravenous Stopped 6/13/24 0135)   iohexol (OMNIPAQUE) 350 MG/ML injection (MULTI-DOSE) 80 mL (80 mL Intravenous Given 6/13/24 0044)       Diagnostic Studies  Results Reviewed       Procedure Component Value Units Date/Time    HS Troponin I 2hr [[id number]]  (Normal) Collected: 06/13/24 0136    Lab Status: Final result Specimen: Blood from Arm, Left Updated: 06/13/24 0222     hs TnI 2hr 3 ng/L      Delta 2hr hsTnI >1 ng/L     Lipase [[id number]]  (Normal) Collected: 06/12/24 2334    Lab Status: Final result Specimen: Blood from Arm, Left Updated: 06/13/24 0022     Lipase 24 u/L      HS Troponin 0hr (reflex protocol) [[id number]]  (Normal) Collected: 06/12/24 2334    Lab Status: Final result Specimen: Blood from Arm, Left Updated: 06/13/24 0010     hs TnI 0hr <2 ng/L     Comprehensive metabolic panel [[id number]] Collected: 06/12/24 2334    Lab Status: Final result Specimen: Blood from Arm, Left Updated: 06/13/24 0006     Sodium 139 mmol/L      Potassium 3.6 mmol/L      Chloride 104 mmol/L      CO2 29 mmol/L      ANION GAP 6 mmol/L      BUN 10 mg/dL      Creatinine 0.86 mg/dL      Glucose 112 mg/dL      Calcium 9.2 mg/dL      AST 15 U/L      ALT 17 U/L      Alkaline Phosphatase 79 U/L      Total Protein 7.4 g/dL      Albumin 4.3 g/dL      Total Bilirubin 0.32 mg/dL      eGFR 102 ml/min/1.73sq m     Narrative:      National Kidney Disease Foundation guidelines for Chronic Kidney Disease (CKD):     Stage 1 with normal or high GFR (GFR > 90 mL/min/1.73 square meters)    Stage 2 Mild CKD (GFR = 60-89 mL/min/1.73 square meters)    Stage 3A Moderate CKD (GFR = 45-59 mL/min/1.73 square meters)    Stage 3B Moderate CKD (GFR = 30-44 mL/min/1.73 square meters)    Stage 4 Severe CKD (GFR = 15-29 mL/min/1.73 square meters)    Stage 5 End Stage CKD (GFR <15 mL/min/1.73 square meters)  Note: GFR calculation is accurate only with a steady state creatinine    CBC and differential [[id number]]  (Abnormal) Collected: 06/12/24 2334    Lab Status: Final result Specimen: Blood from Arm, Left Updated: 06/12/24 2344     WBC 6.95 Thousand/uL      RBC 4.65 Million/uL      Hemoglobin 12.2 g/dL      Hematocrit 38.8 %      MCV 83 fL      MCH 26.2 pg      MCHC 31.4 g/dL      RDW 14.9 %      MPV 9.3 fL      Platelets 271 Thousands/uL      nRBC 0 /100 WBCs      Segmented % 57 %      Immature Grans % 0 %      Lymphocytes % 30 %      Monocytes % 11 %      Eosinophils Relative 1 %      Basophils Relative 1 %      Absolute Neutrophils 4.01 Thousands/µL      Absolute Immature Grans 0.02 Thousand/uL      Absolute Lymphocytes 2.09  Thousands/µL      Absolute Monocytes 0.73 Thousand/µL      Eosinophils Absolute 0.06 Thousand/µL      Basophils Absolute 0.04 Thousands/µL                    CTA dissection protocol chest/abdomen/pelvis   Final Result by Hina Waddell MD (06/13 0257)      No evidence of aortic dissection or intramural hematoma. No aortic aneurysm. Mild atherosclerosis.      Hepatomegaly.      The prostate appears mildly prominent. Clinical and laboratory (PSA) correlation recommended.      Other nonemergent findings as above.               Workstation performed: [id number]         XR chest 1 view portable   ED Interpretation by Veronica Walker MD (06/13 0039)   Per my interpretation: No acute cardiopulmonary disease, no significantly widened mediastinum            Procedures  ECG 12 Lead Documentation Only    Date/Time: 6/13/2024 12:26 AM    Performed by: Veronica Walker MD  Authorized by: Veronica Walker MD    Indications / Diagnosis:  Chest pain  ECG reviewed by me, the ED Provider: yes    Patient location:  ED  Previous ECG:     Previous ECG:  Compared to current    Similarity:  Changes noted    Comparison to cardiac monitor: Yes    Interpretation:     Interpretation: non-specific    Rate:     ECG rate:  63    ECG rate assessment: normal    Rhythm:     Rhythm: sinus rhythm    Ectopy:     Ectopy: none    QRS:     QRS axis:  Normal    QRS intervals:  Normal  Conduction:     Conduction: normal    ST segments:     ST segments:  Non-specific    Elevation:  V3  T waves:     T waves: non-specific    Comments:      QTc 429        ED Course  ED Course as of 06/13/24 0650   Wed Jun 12, 2024   2338 Blood Pressure(!): 174/91   2338 Blood Pressure(!): 174/91  174/91, HR 62, RR 18, SpO2 100%, 98.3 F   2346 Patient seen and evaluated at bedside    Blood pressures during evaluation in the room:  R 155/92,   L 115/94   2359 No blood pressure discrepancy bilaterally. Neurologically intact.   u Jun 13, 2024   0013 hs TnI 0hr:  <2  wnl   0025 LIPASE: 24  Normal   0025 Comprehensive metabolic panel  Normal   0025 WBC: 6.95  Normal   0025 Hemoglobin: 12.2  Normal   0025 Hematocrit: 38.8  Normal   0025 Platelet Count: 271  Normal   0122 CTA dissection protocol chest/abdomen/pelvis  Pending read   0132 CTA dissection protocol chest/abdomen/pelvis  Still pending CT read   0134 Blood Pressure: 146/85  BP improved   0247 CTA dissection protocol chest/abdomen/pelvis  Still pending dissection   0248 Delta 2hr hsTnI: >1   0248 hs TnI 2hr: 3   0310 CTA dissection protocol chest/abdomen/pelvis  IMPRESSION:     No evidence of aortic dissection or intramural hematoma. No aortic aneurysm. Mild atherosclerosis.     Hepatomegaly.     The prostate appears mildly prominent. Clinical and laboratory (PSA) correlation recommended.     Other nonemergent findings as above.             HEART Risk Score      Flowsheet Row Most Recent Value   Heart Score Risk Calculator    History 2 Filed at: 06/13/2024 0051   ECG 1 Filed at: 06/13/2024 0051   Age 1 Filed at: 06/13/2024 0051   Risk Factors 2 Filed at: 06/13/2024 0051   Troponin 0 Filed at: 06/13/2024 0051   HEART Score 6 Filed at: 06/13/2024 0051                        SBIRT 20yo+      Flowsheet Row Most Recent Value   Initial Alcohol Screen: US AUDIT-C     1. How often do you have a drink containing alcohol? 1 Filed at: 06/12/2024 2331   2. How many drinks containing alcohol do you have on a typical day you are drinking?  1 Filed at: 06/12/2024 2331   3a. Male UNDER 65: How often do you have five or more drinks on one occasion? 0 Filed at: 06/12/2024 2331   Audit-C Score 2 Filed at: 06/12/2024 2331   ZANA: How many times in the past year have you...    Used an illegal drug or used a prescription medication for non-medical reasons? Never Filed at: 06/12/2024 2331                  Medical Decision Making  Laron Gordon is a 49 y.o. male presenting for chest pain with associated neck pain and numbness and tingling in  his left arm. On presentation to the ED, patient was afebrile, vital signs showing hypertension that improved without intervention. Exam unremarkable, heart regular rate and rhythm, no murmur, no focal neurologic deficits.    DDX including but not limited to: ACS, MI, PTX, pneumonia, dissection, pleurisy, pericarditis, myocarditis, rhabdomyolysis, GI etiology.      Based on results available while the patient was in the ED:  Cardiac workup did not show signs of MI, no dissection on imaging. Patient symptoms improved with tylenol and Voltaren. At this time unclear exact etiology, possible musculoskeletal aspect as pain did come on with lifting heavy object. Despite negative cardiac work up, patient with elevated Heart score given history of smoking, hypertension and diabetes. Referred to cardiology, patient reported he plans to follow-up with them, to start taking his antihypertensive medications as directed.    See ED Course for further updates    After evaluation and workup in the emergency department Patient appears well, is nontoxic appearing, expresses understanding and agrees with plan of care at this time.  In light of this patient would benefit from outpatient management. Discussed all results with patient including lab work, imaging, and evaluation.  Discussed strict return precautions.  Discussed importance of following up with PCP in the next few days.  All questions answered.  Patient is agreeable to discharge.    Amount and/or Complexity of Data Reviewed  External Data Reviewed: labs and radiology.  Labs: ordered. Decision-making details documented in ED Course.  Radiology: ordered and independent interpretation performed. Decision-making details documented in ED Course.  ECG/medicine tests: ordered and independent interpretation performed.    Risk  Prescription drug management.          Disposition  Final diagnoses:   Chest pain   Elevated blood pressure reading   Hepatomegaly     Time reflects when  diagnosis was documented in both MDM as applicable and the Disposition within this note       Time User Action Codes Description Comment    6/13/2024  3:13 AM Veronica Walker Add [R07.9] Chest pain     6/13/2024  6:49 AM Veronica Walker Add [R03.0] Elevated blood pressure reading     6/13/2024  6:50 AM Veronica Walker Add [R16.0] Hepatomegaly           ED Disposition       ED Disposition   Discharge    Condition   Stable    Date/Time   Thu Jun 13, 2024 0313    Comment   Laron Gordon discharge to home/self care.                   Follow-up Information       Follow up With Specialties Details Why Contact Info Additional Information    Treva Pollock MD Internal Medicine Go to  Re-evaluation of symptoms [address] [facility] [address]  [phone]       [facility] Go to  Re-evaluation of symptoms [address]  [facility] [address]  [phone] [facility], [address], [place], [place], [address]    [facility] Emergency Department Emergency Medicine Go to  As needed, If symptoms worsen [address] [place] [address]  [phone] [facility] Emergency Department, [address] [place] [address], [phone]            Discharge Medication List as of 6/13/2024  3:17 AM        CONTINUE these medications which have NOT CHANGED    Details   amLODIPine (NORVASC) 10 mg tablet Take 1 tablet (10 mg total) by mouth daily, Starting Wed 1/10/2024, Until Tue 4/9/2024, Normal      aspirin (ECOTRIN LOW STRENGTH) 81 mg EC tablet Take 1 tablet (81 mg total) by mouth daily, Starting Wed 1/10/2024, Normal      atorvastatin (LIPITOR) 20 mg tablet Take 1 tablet (20 mg total) by mouth daily, Starting Wed 1/10/2024, Normal      Blood Glucose Monitoring Suppl (ONE TOUCH ULTRA 2) w/Device KIT Use 3 (three) times a day, Starting Thu 12/14/2023, Normal      Blood  Pressure Monitoring (Blood Pressure Monitor/M Cuff) MISC Use 2 (two) times a day, Starting Thu 12/14/2023, Normal      cholecalciferol (VITAMIN D3) 1,000 units tablet Take 1 tablet (1,000 Units total) by mouth daily, Starting Fri 1/12/2024, Normal      glucose blood (OneTouch Ultra) test strip Use as instructed, Normal      Lancets (onetouch ultrasoft) lancets Use as instructed, Normal      metFORMIN (GLUCOPHAGE-XR) 500 mg 24 hr tablet Take 1 tablet (500 mg total) by mouth daily with lunch, Starting Wed 1/10/2024, Normal      pantoprazole (PROTONIX) 40 mg tablet Take 1 tablet (40 mg total) by mouth 2 (two) times a day, Starting Tue 1/9/2024, Normal      terbinafine (LamISIL) 1 % cream Apply topically 2 (two) times a day, Starting Fri 1/12/2024, Normal               PDMP Review         Value Time User    PDMP Reviewed  Yes 6/12/2024 11:40 PM Castro Belcher MD             ED Provider  Attending physically available and evaluated Laron Gordon. I managed the patient along with the ED Attending.    Electronically Signed by           Veronica Walker MD  06/13/24 0688

## 2024-06-13 NOTE — DISCHARGE INSTRUCTIONS
You were evaluated in the emergency department for: chest pain. You can access your current and pending results through [employer]. A radiologist will take a second look at your X-Rays, if you had any, and you will be contacted with any new findings.     - You should follow-up with your primary care provider, as soon as possible, for re-evaluation.  - You have been referred to cardiology    Please, return to the emergency department if you experience new or worsening symptoms, fever, chest pain, shortness of breath, difficulty breathing, dizziness, abdominal pain, persistent nausea/vomiting, syncope or passing out, blood in your urine or stool, coughing up blood, leg swelling/pain, urinary retention, bowel or bladder incontinence, numbness between your legs.

## 2024-06-13 NOTE — ED ATTENDING ATTESTATION
6/12/2024  I, Castro Belcher MD, saw and evaluated the patient. I have discussed the patient with the resident/non-physician practitioner and agree with the resident's/non-physician practitioner's findings, Plan of Care, and MDM as documented in the resident's/non-physician practitioner's note, except where noted. All available labs and Radiology studies were reviewed.  I was present for key portions of any procedure(s) performed by the resident/non-physician practitioner and I was immediately available to provide assistance.       At this point I agree with the current assessment done in the Emergency Department.  I have conducted an independent evaluation of this patient a history and physical is as follows: Patient is a 49 year old male with left sided chest pain with radiation to neck and left arm and shoulder with paresthesias while at work tonight. Has h/o HTN and smoking. (+) father with coronary artery stenting in his 50s. No sob. No fever. No N/V. No trauma. Was last seen in this ED on 3/26/24 for chest wall pain. [facility] website checked on this patient and last Rx filled was on 6/6/24 for suboxone for 30 day supply. NCAT. No scleral icterus. Lungs clear. Heart regular without murmur. Nontender chest wall. Abdomen soft and nontender. Good bowel sounds. No edema. No rash noted. No focal deficits. DDX including but not limited to: ACS, MI, doubt PE; PTX, pneumonia, dissection, pleurisy, pericarditis, myocarditis, doubt rhabdomyolysis; GI etiology. I reviewed EKG and labs. Will check CXR and CT dissection study.     ED Course         Critical Care Time  Procedures

## 2024-06-17 ENCOUNTER — APPOINTMENT (OUTPATIENT)
Dept: URGENT CARE | Facility: CLINIC | Age: 49
End: 2024-06-17
Payer: OTHER MISCELLANEOUS

## 2024-06-17 PROCEDURE — 99283 EMERGENCY DEPT VISIT LOW MDM: CPT | Performed by: NURSE PRACTITIONER

## 2024-06-17 PROCEDURE — G0382 LEV 3 HOSP TYPE B ED VISIT: HCPCS | Performed by: NURSE PRACTITIONER

## 2024-06-21 ENCOUNTER — APPOINTMENT (OUTPATIENT)
Dept: URGENT CARE | Facility: CLINIC | Age: 49
End: 2024-06-21
Payer: OTHER MISCELLANEOUS

## 2024-06-21 PROCEDURE — 99213 OFFICE O/P EST LOW 20 MIN: CPT | Performed by: NURSE PRACTITIONER

## 2024-07-11 ENCOUNTER — HOSPITAL ENCOUNTER (OUTPATIENT)
Facility: HOSPITAL | Age: 49
Setting detail: OBSERVATION
Discharge: HOME | End: 2024-07-11
Attending: EMERGENCY MEDICINE | Admitting: HOSPITALIST
Payer: COMMERCIAL

## 2024-07-11 ENCOUNTER — APPOINTMENT (EMERGENCY)
Dept: RADIOLOGY | Facility: HOSPITAL | Age: 49
End: 2024-07-11
Payer: COMMERCIAL

## 2024-07-11 VITALS
RESPIRATION RATE: 16 BRPM | SYSTOLIC BLOOD PRESSURE: 150 MMHG | TEMPERATURE: 98.4 F | DIASTOLIC BLOOD PRESSURE: 84 MMHG | OXYGEN SATURATION: 100 % | HEART RATE: 75 BPM

## 2024-07-11 DIAGNOSIS — R07.9 CHEST PAIN: ICD-10-CM

## 2024-07-11 DIAGNOSIS — N17.9 AKI (ACUTE KIDNEY INJURY) (HCC): Primary | ICD-10-CM

## 2024-07-11 DIAGNOSIS — I10 HYPERTENSION, UNCONTROLLED: ICD-10-CM

## 2024-07-11 DIAGNOSIS — Z72.0 TOBACCO USE: ICD-10-CM

## 2024-07-11 PROBLEM — Z79.891 CHRONIC PRESCRIPTION OPIATE USE: Status: ACTIVE | Noted: 2024-07-11

## 2024-07-11 LAB
ALBUMIN SERPL BCG-MCNC: 5 G/DL (ref 3.5–5)
ALP SERPL-CCNC: 93 U/L (ref 34–104)
ALT SERPL W P-5'-P-CCNC: 26 U/L (ref 7–52)
ANION GAP SERPL CALCULATED.3IONS-SCNC: 11 MMOL/L (ref 4–13)
ANION GAP SERPL CALCULATED.3IONS-SCNC: 7 MMOL/L (ref 4–13)
AST SERPL W P-5'-P-CCNC: 27 U/L (ref 13–39)
ATRIAL RATE: 106 BPM
BASOPHILS # BLD AUTO: 0.05 THOUSANDS/ÂΜL (ref 0–0.1)
BASOPHILS NFR BLD AUTO: 1 % (ref 0–1)
BILIRUB SERPL-MCNC: 0.52 MG/DL (ref 0.2–1)
BUN SERPL-MCNC: 19 MG/DL (ref 5–25)
BUN SERPL-MCNC: 22 MG/DL (ref 5–25)
CALCIUM SERPL-MCNC: 10 MG/DL (ref 8.4–10.2)
CALCIUM SERPL-MCNC: 8.9 MG/DL (ref 8.4–10.2)
CARDIAC TROPONIN I PNL SERPL HS: <2 NG/L
CHLORIDE SERPL-SCNC: 102 MMOL/L (ref 96–108)
CHLORIDE SERPL-SCNC: 99 MMOL/L (ref 96–108)
CO2 SERPL-SCNC: 26 MMOL/L (ref 21–32)
CO2 SERPL-SCNC: 26 MMOL/L (ref 21–32)
CREAT SERPL-MCNC: 1.21 MG/DL (ref 0.6–1.3)
CREAT SERPL-MCNC: 1.83 MG/DL (ref 0.6–1.3)
EOSINOPHIL # BLD AUTO: 0.03 THOUSAND/ÂΜL (ref 0–0.61)
EOSINOPHIL NFR BLD AUTO: 0 % (ref 0–6)
ERYTHROCYTE [DISTWIDTH] IN BLOOD BY AUTOMATED COUNT: 15.2 % (ref 11.6–15.1)
GFR SERPL CREATININE-BSD FRML MDRD: 42 ML/MIN/1.73SQ M
GFR SERPL CREATININE-BSD FRML MDRD: 70 ML/MIN/1.73SQ M
GLUCOSE SERPL-MCNC: 137 MG/DL (ref 65–140)
GLUCOSE SERPL-MCNC: 95 MG/DL (ref 65–140)
HCT VFR BLD AUTO: 42.4 % (ref 36.5–49.3)
HGB BLD-MCNC: 13.9 G/DL (ref 12–17)
IMM GRANULOCYTES # BLD AUTO: 0.04 THOUSAND/UL (ref 0–0.2)
IMM GRANULOCYTES NFR BLD AUTO: 1 % (ref 0–2)
LYMPHOCYTES # BLD AUTO: 1.41 THOUSANDS/ÂΜL (ref 0.6–4.47)
LYMPHOCYTES NFR BLD AUTO: 19 % (ref 14–44)
MCH RBC QN AUTO: 26.4 PG (ref 26.8–34.3)
MCHC RBC AUTO-ENTMCNC: 32.8 G/DL (ref 31.4–37.4)
MCV RBC AUTO: 81 FL (ref 82–98)
MONOCYTES # BLD AUTO: 1.09 THOUSAND/ÂΜL (ref 0.17–1.22)
MONOCYTES NFR BLD AUTO: 15 % (ref 4–12)
NEUTROPHILS # BLD AUTO: 4.86 THOUSANDS/ÂΜL (ref 1.85–7.62)
NEUTS SEG NFR BLD AUTO: 64 % (ref 43–75)
NRBC BLD AUTO-RTO: 0 /100 WBCS
P AXIS: 58 DEGREES
PLATELET # BLD AUTO: 292 THOUSANDS/UL (ref 149–390)
PMV BLD AUTO: 10 FL (ref 8.9–12.7)
POTASSIUM SERPL-SCNC: 3.4 MMOL/L (ref 3.5–5.3)
POTASSIUM SERPL-SCNC: 3.7 MMOL/L (ref 3.5–5.3)
PR INTERVAL: 122 MS
PROT SERPL-MCNC: 8.8 G/DL (ref 6.4–8.4)
QRS AXIS: 78 DEGREES
QRSD INTERVAL: 82 MS
QT INTERVAL: 356 MS
QTC INTERVAL: 472 MS
RBC # BLD AUTO: 5.27 MILLION/UL (ref 3.88–5.62)
SODIUM SERPL-SCNC: 135 MMOL/L (ref 135–147)
SODIUM SERPL-SCNC: 136 MMOL/L (ref 135–147)
T WAVE AXIS: 63 DEGREES
VENTRICULAR RATE: 106 BPM
WBC # BLD AUTO: 7.48 THOUSAND/UL (ref 4.31–10.16)

## 2024-07-11 PROCEDURE — 99285 EMERGENCY DEPT VISIT HI MDM: CPT

## 2024-07-11 PROCEDURE — 80048 BASIC METABOLIC PNL TOTAL CA: CPT | Performed by: PHYSICIAN ASSISTANT

## 2024-07-11 PROCEDURE — 93005 ELECTROCARDIOGRAM TRACING: CPT

## 2024-07-11 PROCEDURE — 85025 COMPLETE CBC W/AUTO DIFF WBC: CPT

## 2024-07-11 PROCEDURE — 84484 ASSAY OF TROPONIN QUANT: CPT

## 2024-07-11 PROCEDURE — 80053 COMPREHEN METABOLIC PANEL: CPT

## 2024-07-11 PROCEDURE — 99285 EMERGENCY DEPT VISIT HI MDM: CPT | Performed by: EMERGENCY MEDICINE

## 2024-07-11 PROCEDURE — 99235 HOSP IP/OBS SAME DATE MOD 70: CPT | Performed by: HOSPITALIST

## 2024-07-11 PROCEDURE — 96361 HYDRATE IV INFUSION ADD-ON: CPT

## 2024-07-11 PROCEDURE — 71046 X-RAY EXAM CHEST 2 VIEWS: CPT

## 2024-07-11 PROCEDURE — 93010 ELECTROCARDIOGRAM REPORT: CPT | Performed by: INTERNAL MEDICINE

## 2024-07-11 PROCEDURE — 36415 COLL VENOUS BLD VENIPUNCTURE: CPT

## 2024-07-11 PROCEDURE — 96374 THER/PROPH/DIAG INJ IV PUSH: CPT

## 2024-07-11 RX ORDER — BUPRENORPHINE AND NALOXONE 8; 2 MG/1; MG/1
8 FILM, SOLUBLE BUCCAL; SUBLINGUAL 2 TIMES DAILY
Status: DISCONTINUED | OUTPATIENT
Start: 2024-07-11 | End: 2024-07-11 | Stop reason: HOSPADM

## 2024-07-11 RX ORDER — NICOTINE 21 MG/24HR
1 PATCH, TRANSDERMAL 24 HOURS TRANSDERMAL DAILY
Status: DISCONTINUED | OUTPATIENT
Start: 2024-07-11 | End: 2024-07-11 | Stop reason: HOSPADM

## 2024-07-11 RX ORDER — LIDOCAINE 50 MG/G
1 PATCH TOPICAL DAILY
Status: DISCONTINUED | OUTPATIENT
Start: 2024-07-11 | End: 2024-07-11 | Stop reason: HOSPADM

## 2024-07-11 RX ORDER — LORAZEPAM 2 MG/ML
1 INJECTION INTRAMUSCULAR ONCE
Status: COMPLETED | OUTPATIENT
Start: 2024-07-11 | End: 2024-07-11

## 2024-07-11 RX ORDER — HYDROXYZINE HYDROCHLORIDE 25 MG/1
25 TABLET, FILM COATED ORAL EVERY 6 HOURS PRN
Status: DISCONTINUED | OUTPATIENT
Start: 2024-07-11 | End: 2024-07-11 | Stop reason: HOSPADM

## 2024-07-11 RX ORDER — AMLODIPINE BESYLATE 5 MG/1
10 TABLET ORAL DAILY
Status: DISCONTINUED | OUTPATIENT
Start: 2024-07-11 | End: 2024-07-11 | Stop reason: HOSPADM

## 2024-07-11 RX ORDER — ATORVASTATIN CALCIUM 40 MG/1
20 TABLET, FILM COATED ORAL DAILY
Status: DISCONTINUED | OUTPATIENT
Start: 2024-07-11 | End: 2024-07-11 | Stop reason: HOSPADM

## 2024-07-11 RX ORDER — BUPRENORPHINE AND NALOXONE 8; 2 MG/1; MG/1
8 FILM, SOLUBLE BUCCAL; SUBLINGUAL 2 TIMES DAILY
Status: DISCONTINUED | OUTPATIENT
Start: 2024-07-11 | End: 2024-07-11

## 2024-07-11 RX ORDER — ACETAMINOPHEN 325 MG/1
650 TABLET ORAL EVERY 6 HOURS PRN
Status: DISCONTINUED | OUTPATIENT
Start: 2024-07-11 | End: 2024-07-11 | Stop reason: HOSPADM

## 2024-07-11 RX ORDER — SODIUM CHLORIDE 9 MG/ML
125 INJECTION, SOLUTION INTRAVENOUS CONTINUOUS
Status: DISCONTINUED | OUTPATIENT
Start: 2024-07-11 | End: 2024-07-11 | Stop reason: HOSPADM

## 2024-07-11 RX ORDER — LABETALOL HYDROCHLORIDE 5 MG/ML
10 INJECTION, SOLUTION INTRAVENOUS ONCE
Status: DISCONTINUED | OUTPATIENT
Start: 2024-07-11 | End: 2024-07-11

## 2024-07-11 RX ORDER — POTASSIUM CHLORIDE 20 MEQ/1
40 TABLET, EXTENDED RELEASE ORAL ONCE
Status: COMPLETED | OUTPATIENT
Start: 2024-07-11 | End: 2024-07-11

## 2024-07-11 RX ORDER — NICOTINE 21 MG/24HR
1 PATCH, TRANSDERMAL 24 HOURS TRANSDERMAL DAILY
Qty: 28 PATCH | Refills: 0 | Status: SHIPPED | OUTPATIENT
Start: 2024-07-12

## 2024-07-11 RX ORDER — PANTOPRAZOLE SODIUM 40 MG/1
40 TABLET, DELAYED RELEASE ORAL 2 TIMES DAILY
Status: DISCONTINUED | OUTPATIENT
Start: 2024-07-11 | End: 2024-07-11 | Stop reason: HOSPADM

## 2024-07-11 RX ADMIN — ASPIRIN 81 MG: 81 TABLET, COATED ORAL at 08:13

## 2024-07-11 RX ADMIN — LIDOCAINE 5% 1 PATCH: 700 PATCH TOPICAL at 08:14

## 2024-07-11 RX ADMIN — AMLODIPINE BESYLATE 10 MG: 5 TABLET ORAL at 08:16

## 2024-07-11 RX ADMIN — BUPRENORPHINE AND NALOXONE 8 MG: 8; 2 FILM BUCCAL; SUBLINGUAL at 08:14

## 2024-07-11 RX ADMIN — LORAZEPAM 1 MG: 2 INJECTION INTRAMUSCULAR; INTRAVENOUS at 02:39

## 2024-07-11 RX ADMIN — NICOTINE 1 PATCH: 21 PATCH, EXTENDED RELEASE TRANSDERMAL at 08:14

## 2024-07-11 RX ADMIN — ATORVASTATIN CALCIUM 20 MG: 40 TABLET, FILM COATED ORAL at 08:14

## 2024-07-11 RX ADMIN — PANTOPRAZOLE SODIUM 40 MG: 40 TABLET, DELAYED RELEASE ORAL at 06:38

## 2024-07-11 RX ADMIN — SODIUM CHLORIDE 125 ML/HR: 0.9 INJECTION, SOLUTION INTRAVENOUS at 06:38

## 2024-07-11 RX ADMIN — POTASSIUM CHLORIDE 40 MEQ: 1500 TABLET, EXTENDED RELEASE ORAL at 10:22

## 2024-07-11 RX ADMIN — SODIUM CHLORIDE 1000 ML: 0.9 INJECTION, SOLUTION INTRAVENOUS at 02:38

## 2024-07-11 NOTE — ASSESSMENT & PLAN NOTE
History of anxiety, previously on sertraline and Xanax -- no longer taking meds  Received Ativan in the ER with improvement in symptoms   Psych referral at discharge   PRN Atarax

## 2024-07-11 NOTE — ASSESSMENT & PLAN NOTE
/105 and improved to 131/83 after receiving IV Ativan   Has not been taking his Norvasc   Resume Norvasc

## 2024-07-11 NOTE — DISCHARGE SUMMARY
[facility]  Discharge- Laron Gordon 1975, 49 y.o. male MRN: [id number]  Unit/Bed#: ED-33 Encounter: [id number]  Primary Care Provider: Treva Pollock MD   Date and time admitted to hospital: 7/11/2024  1:54 AM    Transient left sided paresthesias  Assessment & Plan  Chronic intermittent issue - has been worked up for this in the past and seen by neurology who suspects likely cardiac vs anxiety/stress related   Symptoms improved after Ativan in the ED   Continue aspirin, statin   Q4H neurochecks    Nicotine dependence  Assessment & Plan  Counseled on smoking cessation.  Patient is willing to take nicotine patch as outpatient.    Chest pain  Assessment & Plan  Pain appears to be noncardiac. Acs ruled out. Smoking does increase his risk of acs, normal stress 3 years ago. Can consider non urgent stress test as outpatient if pain returns  Likely 2/2 uncontrolled hypertension as patient reports he has not been taking his blood pressure medication. Also MSK as onset of pain after lifting heavy objects at work.   PRN Tylenol, Lidocaine patch   Continue to trend troponin     Hypertension  Assessment & Plan  /105 and improved to 131/83 after receiving IV Ativan   Has not been taking his Norvasc   Resume Norvasc    * VICKY (acute kidney injury) (HCC)  Assessment & Plan  For p.o. intake coupled with increased exposure to heat.   Repeat BMP shows improvement after fluid IV fluid hydration.  Urged oral intake for the rest of the day today.              Medical Problems       Resolved Problems  Date Reviewed: 7/11/2024   None         Admission Date:   Admission Orders (From admission, onward)       Ordered        07/11/24 0342  Place in Observation  Once                            Admitting Diagnosis: Numbness [R20.0]    HPI: 49 y.o. male with a PMH of HTN, anxiety, tobacco use, OUD on Subuxone who presents with chest pain and LUE and bilateral LE paresthesias. Has been seen in the ER many  times in the past for the same symptoms. Symptoms started while patient was at work. 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 He states pain at times worse when he turns his head and reproducible with palpation.      Procedures Performed:   Orders Placed This Encounter   Procedures    ED ECG Documentation Only       Summary of Hospital Course: acs ruled out. Arslan improved.     Significant Findings, Care, Treatment and Services Provided:     Complications: none    Condition at Discharge: good         Discharge instructions/Information to patient and family:   See after visit summary for information provided to patient and family.      Provisions for Follow-Up Care:  See after visit summary for information related to follow-up care and any pertinent home health orders.      PCP: Treva Pollock MD    Disposition: Home    Planned Readmission: No    Discharge Statement   I spent 25 minutes discharging the patient. This time was spent on the day of discharge. I had direct contact with the patient on the day of discharge. Additional documentation is required if more than 30 minutes were spent on discharge.     Discharge Medications:  See after visit summary for reconciled discharge medications provided to patient and family.

## 2024-07-11 NOTE — ASSESSMENT & PLAN NOTE
Pain appears to be noncardiac. Acs ruled out. Smoking does increase his risk of acs, normal stress 3 years ago. Can consider non urgent stress test as outpatient if pain returns  Likely 2/2 uncontrolled hypertension as patient reports he has not been taking his blood pressure medication. Also MSK as onset of pain after lifting heavy objects at work.   PRN Tylenol, Lidocaine patch   Continue to trend troponin

## 2024-07-11 NOTE — ASSESSMENT & PLAN NOTE
"Lab Results   Component Value Date    HGBA1C 6.0 12/14/2023       No results for input(s): \"POCGLU\" in the last 72 hours.    Blood Sugar Average: Last 72 hrs:    Hold home metformin   Monitor accuchecks     "

## 2024-07-11 NOTE — ASSESSMENT & PLAN NOTE
Chronic intermittent issue - has been worked up for this in the past and seen by neurology who suspects likely cardiac vs anxiety/stress related   Symptoms improved after Ativan in the ED   Continue aspirin, statin   Q4H neurochecks

## 2024-07-11 NOTE — ASSESSMENT & PLAN NOTE
Patient endorses chest pain and LUE, bilateral LE paresthesias- has been seen in the ER multiple times for the same symptoms.   Troponin negative, EKG no acute ischemic changes   Likely 2/2 uncontrolled hypertension as patient reports he has not been taking his blood pressure medication. Also MSK as onset of pain after lifting heavy objects at work.   PRN Tylenol, Lidocaine patch   Continue to trend troponin

## 2024-07-11 NOTE — ED ATTENDING ATTESTATION
7/11/2024  I, Dennis Berrios MD, saw and evaluated the patient. I have discussed the patient with the resident/non-physician practitioner and agree with the resident's/non-physician practitioner's findings, Plan of Care, and MDM as documented in the resident's/non-physician practitioner's note, except where noted. All available labs and Radiology studies were reviewed.  I was present for key portions of any procedure(s) performed by the resident/non-physician practitioner and I was immediately available to provide assistance.       At this point I agree with the current assessment done in the Emergency Department.  I have conducted an independent evaluation of this patient a history and physical is as follows: Diffuse cramping, chest pain, hypertension, not taking home antihypertensive medications.  Labs concerning for acute kidney injury with normal BUN.  Blood pressure 176/105 here, improved to 140s without specific intervention in the ED.  Will need admission for VICKY in the setting of elevated blood pressure at home as this could be indicative of hypertensive emergency.    Results Reviewed       Procedure Component Value Units Date/Time    HS Troponin 0hr (reflex protocol) [[id number]]  (Normal) Collected: 07/11/24 0238    Lab Status: Final result Specimen: Blood from Arm, Left Updated: 07/11/24 0309     hs TnI 0hr <2 ng/L     HS Troponin I 2hr [[id number]]     Lab Status: No result Specimen: Blood     CBC and differential [[id number]]  (Abnormal) Collected: 07/11/24 0238    Lab Status: Final result Specimen: Blood from Arm, Left Updated: 07/11/24 0304     WBC 7.48 Thousand/uL      RBC 5.27 Million/uL      Hemoglobin 13.9 g/dL      Hematocrit 42.4 %      MCV 81 fL      MCH 26.4 pg      MCHC 32.8 g/dL      RDW 15.2 %      MPV 10.0 fL      Platelets 292 Thousands/uL      nRBC 0 /100 WBCs      Segmented % 64 %      Immature Grans % 1 %      Lymphocytes % 19 %      Monocytes % 15 %      Eosinophils Relative 0 %       Basophils Relative 1 %      Absolute Neutrophils 4.86 Thousands/µL      Absolute Immature Grans 0.04 Thousand/uL      Absolute Lymphocytes 1.41 Thousands/µL      Absolute Monocytes 1.09 Thousand/µL      Eosinophils Absolute 0.03 Thousand/µL      Basophils Absolute 0.05 Thousands/µL     Comprehensive metabolic panel [[id number]]  (Abnormal) Collected: 07/11/24 0238    Lab Status: Final result Specimen: Blood from Arm, Left Updated: 07/11/24 0301     Sodium 136 mmol/L      Potassium 3.7 mmol/L      Chloride 99 mmol/L      CO2 26 mmol/L      ANION GAP 11 mmol/L      BUN 22 mg/dL      Creatinine 1.83 mg/dL      Glucose 95 mg/dL      Calcium 10.0 mg/dL      AST 27 U/L      ALT 26 U/L      Alkaline Phosphatase 93 U/L      Total Protein 8.8 g/dL      Albumin 5.0 g/dL      Total Bilirubin 0.52 mg/dL      eGFR 42 ml/min/1.73sq m     Narrative:      National Kidney Disease Foundation guidelines for Chronic Kidney Disease (CKD):     Stage 1 with normal or high GFR (GFR > 90 mL/min/1.73 square meters)    Stage 2 Mild CKD (GFR = 60-89 mL/min/1.73 square meters)    Stage 3A Moderate CKD (GFR = 45-59 mL/min/1.73 square meters)    Stage 3B Moderate CKD (GFR = 30-44 mL/min/1.73 square meters)    Stage 4 Severe CKD (GFR = 15-29 mL/min/1.73 square meters)    Stage 5 End Stage CKD (GFR <15 mL/min/1.73 square meters)  Note: GFR calculation is accurate only with a steady state creatinine          XR chest 2 views    (Results Pending)         ED Course         Critical Care Time  Procedures       For information on Fall & Injury Prevention, visit: [email or web address] OR  [email or web address] OR  https://www.cdc.gov/steadi/patient.html For information on Fall & Injury Prevention, visit: [email or web address] OR  [email or web address] OR  https://www.cdc.gov/steadi/patient.html

## 2024-07-11 NOTE — ASSESSMENT & PLAN NOTE
Presents with c/o chest pain and LUE, bilateral LE paresthesias. Noted to have elevated /105 and Cr 1.83.   Baseline Cr 0.8-0.9  IVF   Repeat BMP in AM

## 2024-07-11 NOTE — ASSESSMENT & PLAN NOTE
For p.o. intake coupled with increased exposure to heat.   Repeat BMP shows improvement after fluid IV fluid hydration.  Urged oral intake for the rest of the day today.

## 2024-07-11 NOTE — DISCHARGE INSTR - AVS FIRST PAGE
Dear Laron Gordon,     It was our pleasure to care for you here at [facility].  It is our hope that we were always able to exceed the expected standards for your care during your stay.  You were hospitalized due to chest pain.  You were ruled out for heart attack while admitted here.  Chest pain was felt to be due to noncardiac causes including musculoskeletal pain.  Recommend you follow-up with the cardiologist as an outpatient for an outpatient stress test if the patient pain returns or persists.  We also recommend you stop smoking as it can increase your risk for heart disease.  You were cared for in the ER under the service of Beena Valdes MD with the [facility] who covers for your primary care physician (PCP), Treva Pollock MD, while you were hospitalized.  If you have any questions or concerns related to this hospitalization, you may contact us at .  For follow up as well as medication refills, we recommend that you follow up with your primary care physician.  A registered nurse will reach out to you by phone within a few days after your discharge to answer any additional questions that you may have after going home.  However, at this time we provide for you here, the most important instructions / recommendations at discharge:     Notable Medication Adjustments -   none  Testing Required after Discharge -   Outpatient stress test  ** Please contact your PCP to request testing orders for any of the testing recommended here **  Important follow up information -   none  Other Instructions -   none  Please review this entire after visit summary as additional general instructions including medication list, appointments, activity, diet, any pertinent wound care, and other additional recommendations from your care team that may be provided for you.      Sincerely,     Beena Valdes MD

## 2024-07-11 NOTE — ED PROVIDER NOTES
History  Chief Complaint   Patient presents with    Numbness     Pt c/o of left sided numbness that started at 1 am. Pt states it started in his left arm then radiated to left leg. No pt c/o of left arm/leg cramping. +CP/neck pain. 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 Pt states he he developed numbness and muscle cramping to the LUE and bilateral LE around 1am along with left sided chest pain. He states pain goes to the neck as well.  He reports a history of hypertension but states he has not taken any medication for several years. He also notes his mother recently  of a stroke and her is worried sx could be a stroke. Denies syncope, SOB, weakness, back pain, abdominal pain, difficulty urinating, recent trauma or injury. Pt also notes he is on suboxone therapy 8mg BID.         Prior to Admission Medications   Prescriptions Last Dose Informant Patient Reported? Taking?   Blood Glucose Monitoring Suppl (ONE TOUCH ULTRA 2) w/Device KIT   No No   Sig: Use 3 (three) times a day   Blood Pressure Monitoring (Blood Pressure Monitor/M Cuff) MISC   No No   Sig: Use 2 (two) times a day   Lancets (onetouch ultrasoft) lancets   No No   Sig: Use as instructed   amLODIPine (NORVASC) 10 mg tablet   No No   Sig: Take 1 tablet (10 mg total) by mouth daily   aspirin (ECOTRIN LOW STRENGTH) 81 mg EC tablet   No No   Sig: Take 1 tablet (81 mg total) by mouth daily   atorvastatin (LIPITOR) 20 mg tablet   No No   Sig: Take 1 tablet (20 mg total) by mouth daily   cholecalciferol (VITAMIN D3) 1,000 units tablet   No No   Sig: Take 1 tablet (1,000 Units total) by mouth daily   glucose blood (OneTouch Ultra) test strip   No No   Sig: Use as instructed   metFORMIN (GLUCOPHAGE-XR) 500 mg 24 hr tablet   No No   Sig: Take 1 tablet (500 mg total) by mouth daily with lunch   pantoprazole  (PROTONIX) 40 mg tablet   No No   Sig: Take 1 tablet (40 mg total) by mouth 2 (two) times a day   terbinafine (LamISIL) 1 % cream   No No   Sig: Apply topically 2 (two) times a day      Facility-Administered Medications: None       Past Medical History:   Diagnosis Date    Chest pain 1/12/2021    Diabetes mellitus (HCC)     Exposure to SARS-associated coronavirus 10/5/2020    Hypertension     Viral upper respiratory tract infection 10/7/2020       History reviewed. No pertinent surgical history.    Family History   Problem Relation Age of Onset    Diabetes Mother     Diabetes Father      I have reviewed and agree with the history as documented.    E-Cigarette/Vaping    E-Cigarette Use Never User      E-Cigarette/Vaping Substances    Nicotine No     THC No     CBD No     Flavoring No     Other No     Unknown No      Social History     Tobacco Use    Smoking status: Every Day     Current packs/day: 1.00     Average packs/day: 0.5 packs/day for 29.4 years (15.0 ttl pk-yrs)     Types: Cigarettes     Start date: 2/18/1995     Passive exposure: Past    Smokeless tobacco: Never   Vaping Use    Vaping status: Never Used   Substance Use Topics    Alcohol use: Not Currently     Comment: socially    Drug use: Yes     Types: Marijuana        Review of Systems   Constitutional:  Negative for chills and fever.   HENT:  Negative for ear pain and sore throat.    Eyes:  Negative for pain and visual disturbance.   Respiratory:  Negative for cough and shortness of breath.    Cardiovascular:  Positive for chest pain. Negative for palpitations.   Gastrointestinal:  Negative for abdominal pain and vomiting.   Genitourinary:  Negative for dysuria and hematuria.   Musculoskeletal:  Positive for neck pain. Negative for arthralgias and back pain.   Skin:  Negative for color change and rash.   Neurological:  Positive for numbness. Negative for seizures and syncope.   Psychiatric/Behavioral:  The patient is nervous/anxious.    All other  systems reviewed and are negative.      Physical Exam  ED Triage Vitals   Temperature Pulse Respirations Blood Pressure SpO2   07/11/24 0208 07/11/24 0159 07/11/24 0159 07/11/24 0159 07/11/24 0159   98.4 °F (36.9 °C) (!) 106 20 (!) 167/106 98 %      Temp Source Heart Rate Source Patient Position - Orthostatic VS BP Location FiO2 (%)   07/11/24 0208 07/11/24 0159 07/11/24 0159 07/11/24 0159 --   Oral Monitor Sitting Right arm       Pain Score       --                    Orthostatic Vital Signs  Vitals:    07/11/24 0159 07/11/24 0230 07/11/24 0300 07/11/24 0330   BP: (!) 167/106 (!) 176/105 145/86 131/83   Pulse: (!) 106 97 89 87   Patient Position - Orthostatic VS: Sitting  Lying Lying       Physical Exam  Vitals and nursing note reviewed.   Constitutional:       Appearance: Normal appearance.   HENT:      Head: Normocephalic and atraumatic.      Nose: Nose normal.      Mouth/Throat:      Mouth: Mucous membranes are moist.      Pharynx: Oropharynx is clear.   Eyes:      Extraocular Movements: Extraocular movements intact.      Conjunctiva/sclera: Conjunctivae normal.      Pupils: Pupils are equal, round, and reactive to light.   Cardiovascular:      Rate and Rhythm: Normal rate and regular rhythm.      Pulses: Normal pulses.      Heart sounds: Normal heart sounds.   Pulmonary:      Effort: Pulmonary effort is normal. No respiratory distress.      Breath sounds: Normal breath sounds. No stridor. No wheezing, rhonchi or rales.   Abdominal:      General: Abdomen is flat. Bowel sounds are normal.      Palpations: Abdomen is soft.      Tenderness: There is no abdominal tenderness. There is no right CVA tenderness or left CVA tenderness.   Musculoskeletal:         General: No swelling, tenderness, deformity or signs of injury. Normal range of motion.      Cervical back: Normal range of motion and neck supple.      Right lower leg: No edema.      Left lower leg: No edema.   Skin:     General: Skin is warm and dry.    Neurological:      General: No focal deficit present.      Mental Status: He is alert and oriented to person, place, and time.      Cranial Nerves: No cranial nerve deficit.      Sensory: No sensory deficit.      Motor: No weakness.      Coordination: Coordination normal.      Gait: Gait normal.   Psychiatric:         Mood and Affect: Mood is anxious.         Behavior: Behavior normal.         Thought Content: Thought content normal.         Judgment: Judgment normal.         ED Medications  Medications   buprenorphine-naloxone (Suboxone) film 8 mg (has no administration in time range)   sodium chloride 0.9 % bolus 1,000 mL (1,000 mL Intravenous New Bag 7/11/24 0238)   LORazepam (ATIVAN) injection 1 mg (1 mg Intravenous Given 7/11/24 0239)       Diagnostic Studies  Results Reviewed       Procedure Component Value Units Date/Time    HS Troponin 0hr (reflex protocol) [[id number]]  (Normal) Collected: 07/11/24 0238    Lab Status: Final result Specimen: Blood from Arm, Left Updated: 07/11/24 0309     hs TnI 0hr <2 ng/L     HS Troponin I 2hr [[id number]]     Lab Status: No result Specimen: Blood     CBC and differential [[id number]]  (Abnormal) Collected: 07/11/24 0238    Lab Status: Final result Specimen: Blood from Arm, Left Updated: 07/11/24 0304     WBC 7.48 Thousand/uL      RBC 5.27 Million/uL      Hemoglobin 13.9 g/dL      Hematocrit 42.4 %      MCV 81 fL      MCH 26.4 pg      MCHC 32.8 g/dL      RDW 15.2 %      MPV 10.0 fL      Platelets 292 Thousands/uL      nRBC 0 /100 WBCs      Segmented % 64 %      Immature Grans % 1 %      Lymphocytes % 19 %      Monocytes % 15 %      Eosinophils Relative 0 %      Basophils Relative 1 %      Absolute Neutrophils 4.86 Thousands/µL      Absolute Immature Grans 0.04 Thousand/uL      Absolute Lymphocytes 1.41 Thousands/µL      Absolute Monocytes 1.09 Thousand/µL      Eosinophils Absolute 0.03 Thousand/µL      Basophils Absolute 0.05 Thousands/µL     Comprehensive metabolic panel  [[id number]]  (Abnormal) Collected: 07/11/24 0238    Lab Status: Final result Specimen: Blood from Arm, Left Updated: 07/11/24 0301     Sodium 136 mmol/L      Potassium 3.7 mmol/L      Chloride 99 mmol/L      CO2 26 mmol/L      ANION GAP 11 mmol/L      BUN 22 mg/dL      Creatinine 1.83 mg/dL      Glucose 95 mg/dL      Calcium 10.0 mg/dL      AST 27 U/L      ALT 26 U/L      Alkaline Phosphatase 93 U/L      Total Protein 8.8 g/dL      Albumin 5.0 g/dL      Total Bilirubin 0.52 mg/dL      eGFR 42 ml/min/1.73sq m     Narrative:      National Kidney Disease Foundation guidelines for Chronic Kidney Disease (CKD):     Stage 1 with normal or high GFR (GFR > 90 mL/min/1.73 square meters)    Stage 2 Mild CKD (GFR = 60-89 mL/min/1.73 square meters)    Stage 3A Moderate CKD (GFR = 45-59 mL/min/1.73 square meters)    Stage 3B Moderate CKD (GFR = 30-44 mL/min/1.73 square meters)    Stage 4 Severe CKD (GFR = 15-29 mL/min/1.73 square meters)    Stage 5 End Stage CKD (GFR <15 mL/min/1.73 square meters)  Note: GFR calculation is accurate only with a steady state creatinine                   XR chest 2 views   ED Interpretation by Radha Ramos MD (07/11 0341)   NO acute disease process            Procedures  ECG 12 Lead Documentation Only    Date/Time: 7/11/2024 3:06 AM    Performed by: Radha Ramos MD  Authorized by: Radha Ramos MD    Indications / Diagnosis:  Cp  ECG reviewed by me, the ED Provider: yes    Patient location:  ED  Previous ECG:     Previous ECG:  Compared to current    Comparison ECG info:  06/13/24 SB    Similarity:  Changes noted    Comparison to cardiac monitor: Yes    Interpretation:     Interpretation: normal    Rate:     ECG rate:  103    ECG rate assessment: tachycardic    Rhythm:     Rhythm: sinus tachycardia    Ectopy:     Ectopy: none    QRS:     QRS axis:  Normal    QRS intervals:  Normal  Conduction:     Conduction: normal    ST segments:     ST segments:  Normal  T waves:     T waves:  normal          ED Course  ED Course as of 07/11/24 0357   Thu Jul 11, 2024   0310 Recheck of pt  -  still having discomfort in the chest and LE arm and bilateral LE   0335 Recheck of pt and BP was 146 systolic, so I asked the RN to hold the ordered labetalol at this time and give if SPB increases again.              HEART Risk Score      Flowsheet Row Most Recent Value   Heart Score Risk Calculator    History 0 Filed at: 07/11/2024 0344   ECG 0 Filed at: 07/11/2024 0344   Age 1 Filed at: 07/11/2024 0344   Risk Factors 1 Filed at: 07/11/2024 0344   Troponin 0 Filed at: 07/11/2024 0344   HEART Score 2 Filed at: 07/11/2024 0344                                  Medical Decision Making  DDX; MI, ACS, anxiety, electrolyte abnormality, cardiac arrhythmia, VICKY, dehydration, hypertensive emergency, PNA    CXR normal  Elevated BP noted on vitals. No fever  Ordered 10mg labetalol IV. Decreased BP to 146 spontaneously following ativan so asked RN to hold for time being  EKG normal  VICKY in CMP. Likely secondary to uncontrolled HTN  Pt admitted to SLIM service.       Amount and/or Complexity of Data Reviewed  Labs: ordered.  Radiology: ordered and independent interpretation performed.    Risk  Prescription drug management.  Decision regarding hospitalization.          Disposition  Final diagnoses:   VICKY (acute kidney injury) (HCC)   Hypertension, uncontrolled   Chest pain     Time reflects when diagnosis was documented in both MDM as applicable and the Disposition within this note       Time User Action Codes Description Comment    7/11/2024  3:14 AM Radha Ramos Add [N17.9] VICKY (acute kidney injury) (HCC)     7/11/2024  3:28 AM Radha Ramos Amanda Add [I10] Hypertension, uncontrolled     7/11/2024  3:28 AM Radha Ramos Amanda Add [R07.9] Chest pain           ED Disposition       ED Disposition   Admit    Condition   Stable    Date/Time   Thu Jul 11, 2024 0341    Comment   Case was discussed with YUDITH and the patient's  admission status was agreed to be Admission Status: observation status to the service of Dr. David .               Follow-up Information    None         Patient's Medications   Discharge Prescriptions    No medications on file     No discharge procedures on file.    PDMP Review         Value Time User    PDMP Reviewed  Yes 6/12/2024 11:40 PM Castro Belcher MD             ED Provider  Attending physically available and evaluated Laron Gordon. I managed the patient along with the ED Attending.    Electronically Signed by           Radha Ramos MD  07/11/24 0357

## 2024-07-11 NOTE — H&P
[facility]  H&P  Name: Laron Gordon 49 y.o. male I MRN: [id number]  Unit/Bed#: ED-33 I Date of Admission: 7/11/2024   Date of Service: 7/11/2024 I Hospital Day: 0      Assessment & Plan   * VICKY (acute kidney injury) (HCC)  Assessment & Plan  Presents with c/o chest pain and LUE, bilateral LE paresthesias. Noted to have elevated /105 and Cr 1.83.   Baseline Cr 0.8-0.9  IVF   Repeat BMP in AM     Chest pain  Assessment & Plan  Patient endorses chest pain and LUE, bilateral LE paresthesias- has been seen in the ER multiple times for the same symptoms.   Troponin negative, EKG no acute ischemic changes   Likely 2/2 uncontrolled hypertension as patient reports he has not been taking his blood pressure medication. Also MSK as onset of pain after lifting heavy objects at work.   PRN Tylenol, Lidocaine patch   Continue to trend troponin     Hypertension  Assessment & Plan  /105 and improved to 131/83 after receiving IV Ativan   Has not been taking his Norvasc   Resume Norvasc    Transient left sided paresthesias  Assessment & Plan  Chronic intermittent issue - has been worked up for this in the past and seen by neurology who suspects likely cardiac vs anxiety/stress related   Symptoms improved after Ativan in the ED   Continue aspirin, statin   Q4H neurochecks    Chronic prescription opiate use  Assessment & Plan  Continue Suboxone 8-2mg BID -- confirmed by PDMP     Anxiety  Assessment & Plan  History of anxiety, previously on sertraline and Xanax -- no longer taking meds  Received Ativan in the ER with improvement in symptoms   Psych referral at discharge   PRN Atarax    Nicotine dependence  Assessment & Plan  NRT   Encourage cessation            VTE Pharmacologic Prophylaxis: VTE Score: 1 Low Risk (Score 0-2) - Encourage Ambulation.  Code Status: Level 1 - Full Code   Discussion with family: Patient declined call to .     Anticipated Length of Stay: Patient will be  admitted on an observation basis with an anticipated length of stay of less than 2 midnights secondary to VICKY.    Total Time Spent on Date of Encounter in care of patient: 45 mins. This time was spent on one or more of the following: performing physical exam; counseling and coordination of care; obtaining or reviewing history; documenting in the medical record; reviewing/ordering tests, medications or procedures; communicating with other healthcare professionals and discussing with patient's family/caregivers.    Chief Complaint: chest pain, LUE and bilateral LE paresthesias/cramping    History of Present Illness:  Laron Gordon is a 49 y.o. male with a PMH of HTN, anxiety, tobacco use, OUD on Subuxone who presents with chest pain and LUE and bilateral LE paresthesias. Has been seen in the ER many times in the past for the same symptoms. Symptoms started while patient was at work. 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 He states pain at times worse when he turns his head and reproducible with palpation. He reports that historically he does not drink enough water, he has been trying to drink 3 bottles of water on the days that he works but with this heat wave that is not enough. Denies alcohol use. Smokes cigarettes.     Review of Systems:  Review of Systems   Constitutional:  Negative for chills and fever.   Respiratory:  Negative for shortness of breath.    Cardiovascular:  Positive for chest pain.   Gastrointestinal:  Negative for abdominal pain, nausea and vomiting.   Neurological:  Positive for numbness. Negative for syncope, speech difficulty and weakness.   Psychiatric/Behavioral:  Negative for confusion.         Past Medical and Surgical History:   Past Medical History:   Diagnosis Date    Chest pain 1/12/2021    Diabetes mellitus (HCC)     Exposure to SARS-associated coronavirus 10/5/2020    Hypertension     Viral upper respiratory tract infection  10/7/2020       History reviewed. No pertinent surgical history.    Meds/Allergies:  Prior to Admission medications    Medication Sig Start Date End Date Taking? Authorizing Provider   amLODIPine (NORVASC) 10 mg tablet Take 1 tablet (10 mg total) by mouth daily 1/10/24 4/9/24  Olegario Wallace MD   aspirin (ECOTRIN LOW STRENGTH) 81 mg EC tablet Take 1 tablet (81 mg total) by mouth daily 1/10/24   Olegaroi Wallace MD   atorvastatin (LIPITOR) 20 mg tablet Take 1 tablet (20 mg total) by mouth daily 1/10/24   Olegario Wallace MD   Blood Glucose Monitoring Suppl (ONE TOUCH ULTRA 2) w/Device KIT Use 3 (three) times a day 12/14/23   Swathi Andersen DO   Blood Pressure Monitoring (Blood Pressure Monitor/M Cuff) MISC Use 2 (two) times a day 12/14/23   Swathi Andersen, DO   cholecalciferol (VITAMIN D3) 1,000 units tablet Take 1 tablet (1,000 Units total) by mouth daily 1/12/24   Joshua Lomeli MD   glucose blood (OneTouch Ultra) test strip Use as instructed 1/10/24   Olegario Wallace MD   Lancets (onetouch ultrasoft) lancets Use as instructed 1/10/24   Olegario Wallace MD   metFORMIN (GLUCOPHAGE-XR) 500 mg 24 hr tablet Take 1 tablet (500 mg total) by mouth daily with lunch 1/10/24   Olegario Wallace MD   pantoprazole (PROTONIX) 40 mg tablet Take 1 tablet (40 mg total) by mouth 2 (two) times a day 1/9/24   Shadi Sandoval MD   terbinafine (LamISIL) 1 % cream Apply topically 2 (two) times a day 1/12/24   Joshua Lomeli MD     I have reviewed home medications with patient personally.    Allergies: No Known Allergies    Social History:  Marital Status: Legally    Patient Pre-hospital Living Situation: Home  Patient Pre-hospital Level of Mobility: walks  Patient Pre-hospital Diet Restrictions: none  Substance Use History:   Social History     Substance and Sexual Activity   Alcohol Use Not Currently    Comment: socially     Social History     Tobacco Use   Smoking Status Every Day    Current packs/day: 1.00     Average packs/day: 0.5 packs/day for 29.4 years (15.0 ttl pk-yrs)    Types: Cigarettes    Start date: 2/18/1995    Passive exposure: Past   Smokeless Tobacco Never     Social History     Substance and Sexual Activity   Drug Use Yes    Types: Marijuana       Family History:  Family History   Problem Relation Age of Onset    Diabetes Mother     Diabetes Father        Physical Exam:     Vitals:   Blood Pressure: 131/83 (07/11/24 0330)  Pulse: 87 (07/11/24 0330)  Temperature: 98.4 °F (36.9 °C) (07/11/24 0208)  Temp Source: Oral (07/11/24 0208)  Respirations: 18 (07/11/24 0330)  SpO2: 95 % (07/11/24 0330)    Physical Exam  Constitutional:       General: He is not in acute distress.  HENT:      Mouth/Throat:      Mouth: Mucous membranes are dry.   Eyes:      General: No scleral icterus.  Cardiovascular:      Rate and Rhythm: Normal rate and regular rhythm.      Heart sounds: Normal heart sounds.   Pulmonary:      Breath sounds: Normal breath sounds.   Chest:      Chest wall: Tenderness present.   Abdominal:      General: Abdomen is flat. Bowel sounds are normal.      Palpations: Abdomen is soft.      Tenderness: There is no abdominal tenderness.   Musculoskeletal:      Right lower leg: No edema.      Left lower leg: No edema.   Skin:     General: Skin is warm and dry.   Neurological:      General: No focal deficit present.      Mental Status: He is alert and oriented to person, place, and time.      Cranial Nerves: No cranial nerve deficit.      Sensory: No sensory deficit.   Psychiatric:         Mood and Affect: Mood normal.          Additional Data:     Lab Results:  Results from last 7 days   Lab Units 07/11/24  0238   WBC Thousand/uL 7.48   HEMOGLOBIN g/dL 13.9   HEMATOCRIT % 42.4   PLATELETS Thousands/uL 292   SEGS PCT % 64   LYMPHO PCT % 19   MONO PCT % 15*   EOS PCT % 0     Results from last 7 days   Lab Units 07/11/24  0238   SODIUM mmol/L 136   POTASSIUM mmol/L 3.7   CHLORIDE mmol/L 99   CO2 mmol/L 26   BUN mg/dL  22   CREATININE mg/dL 1.83*   ANION GAP mmol/L 11   CALCIUM mg/dL 10.0   ALBUMIN g/dL 5.0   TOTAL BILIRUBIN mg/dL 0.52   ALK PHOS U/L 93   ALT U/L 26   AST U/L 27   GLUCOSE RANDOM mg/dL 95             Lab Results   Component Value Date    HGBA1C 6.0 12/14/2023    HGBA1C 6.5 09/15/2021    HGBA1C 5.7 (H) 01/11/2021           Lines/Drains:  Invasive Devices       Peripheral Intravenous Line  Duration             Peripheral IV 07/11/24 Left Antecubital <1 day                        Imaging: Personally reviewed the following imaging: chest xray  XR chest 2 views   ED Interpretation by Radha Ramos MD (07/11 0341)   NO acute disease process          EKG and Other Studies Reviewed on Admission:   EKG: Personally Reviewed. Sinus Tachycardia. .    ** Please Note: This note has been constructed using a voice recognition system. **

## 2024-07-11 NOTE — LETTER
[facility]  [address]  [place] [id number]  Dept: [phone]    July 11, 2024     Patient: Laron Gordon   YOB: 1975   Date of Visit: 7/11/2024       To Whom it May Concern:    Laron Gordon is under my professional care. He was seen in the hospital from 7/11/2024 to 07/11/24. He may return to work on 7/13/24 without limitations.    If you have any questions or concerns, please don't hesitate to call.         Sincerely,          Beena Valdes MD

## 2024-07-11 NOTE — Clinical Note
Case was discussed with YUDITH and the patient's admission status was agreed to be Admission Status: observation status to the service of Dr. David .

## 2024-07-12 ENCOUNTER — TELEPHONE (OUTPATIENT)
Dept: INTERNAL MEDICINE CLINIC | Facility: CLINIC | Age: 49
End: 2024-07-12

## 2024-07-12 NOTE — TELEPHONE ENCOUNTER
I called patient to inquire about scheduling an appointment since has been in the ED several times. I informed him to call office to schedule for next week.

## 2024-07-30 ENCOUNTER — HOSPITAL ENCOUNTER (EMERGENCY)
Facility: HOSPITAL | Age: 49
Discharge: HOME | End: 2024-07-31
Attending: EMERGENCY MEDICINE
Payer: COMMERCIAL

## 2024-07-30 DIAGNOSIS — R07.9 CHEST PAIN: ICD-10-CM

## 2024-07-30 DIAGNOSIS — R20.2 PARESTHESIAS: Primary | ICD-10-CM

## 2024-07-30 LAB
ALBUMIN SERPL BCG-MCNC: 4.4 G/DL (ref 3.5–5)
ALP SERPL-CCNC: 90 U/L (ref 34–104)
ALT SERPL W P-5'-P-CCNC: 25 U/L (ref 7–52)
ANION GAP SERPL CALCULATED.3IONS-SCNC: 6 MMOL/L (ref 4–13)
AST SERPL W P-5'-P-CCNC: 17 U/L (ref 13–39)
BASOPHILS # BLD AUTO: 0.07 THOUSANDS/ÂΜL (ref 0–0.1)
BASOPHILS NFR BLD AUTO: 1 % (ref 0–1)
BILIRUB SERPL-MCNC: 0.38 MG/DL (ref 0.2–1)
BUN SERPL-MCNC: 9 MG/DL (ref 5–25)
CALCIUM SERPL-MCNC: 9.3 MG/DL (ref 8.4–10.2)
CARDIAC TROPONIN I PNL SERPL HS: 3 NG/L
CHLORIDE SERPL-SCNC: 103 MMOL/L (ref 96–108)
CO2 SERPL-SCNC: 29 MMOL/L (ref 21–32)
CREAT SERPL-MCNC: 0.96 MG/DL (ref 0.6–1.3)
EOSINOPHIL # BLD AUTO: 0.1 THOUSAND/ÂΜL (ref 0–0.61)
EOSINOPHIL NFR BLD AUTO: 1 % (ref 0–6)
ERYTHROCYTE [DISTWIDTH] IN BLOOD BY AUTOMATED COUNT: 15.9 % (ref 11.6–15.1)
GFR SERPL CREATININE-BSD FRML MDRD: 93 ML/MIN/1.73SQ M
GLUCOSE SERPL-MCNC: 100 MG/DL (ref 65–140)
GLUCOSE SERPL-MCNC: 111 MG/DL (ref 65–140)
HCT VFR BLD AUTO: 41.4 % (ref 36.5–49.3)
HGB BLD-MCNC: 13.4 G/DL (ref 12–17)
IMM GRANULOCYTES # BLD AUTO: 0.05 THOUSAND/UL (ref 0–0.2)
IMM GRANULOCYTES NFR BLD AUTO: 1 % (ref 0–2)
LYMPHOCYTES # BLD AUTO: 4.21 THOUSANDS/ÂΜL (ref 0.6–4.47)
LYMPHOCYTES NFR BLD AUTO: 48 % (ref 14–44)
MCH RBC QN AUTO: 26.6 PG (ref 26.8–34.3)
MCHC RBC AUTO-ENTMCNC: 32.4 G/DL (ref 31.4–37.4)
MCV RBC AUTO: 82 FL (ref 82–98)
MONOCYTES # BLD AUTO: 1.04 THOUSAND/ÂΜL (ref 0.17–1.22)
MONOCYTES NFR BLD AUTO: 12 % (ref 4–12)
NEUTROPHILS # BLD AUTO: 3.19 THOUSANDS/ÂΜL (ref 1.85–7.62)
NEUTS SEG NFR BLD AUTO: 37 % (ref 43–75)
NRBC BLD AUTO-RTO: 0 /100 WBCS
PLATELET # BLD AUTO: 265 THOUSANDS/UL (ref 149–390)
PMV BLD AUTO: 9.2 FL (ref 8.9–12.7)
POTASSIUM SERPL-SCNC: 3.2 MMOL/L (ref 3.5–5.3)
PROT SERPL-MCNC: 7.9 G/DL (ref 6.4–8.4)
RBC # BLD AUTO: 5.04 MILLION/UL (ref 3.88–5.62)
SODIUM SERPL-SCNC: 138 MMOL/L (ref 135–147)
WBC # BLD AUTO: 8.66 THOUSAND/UL (ref 4.31–10.16)

## 2024-07-30 PROCEDURE — 99284 EMERGENCY DEPT VISIT MOD MDM: CPT

## 2024-07-30 PROCEDURE — 93005 ELECTROCARDIOGRAM TRACING: CPT

## 2024-07-30 PROCEDURE — 84484 ASSAY OF TROPONIN QUANT: CPT | Performed by: EMERGENCY MEDICINE

## 2024-07-30 PROCEDURE — 82948 REAGENT STRIP/BLOOD GLUCOSE: CPT

## 2024-07-30 PROCEDURE — 36415 COLL VENOUS BLD VENIPUNCTURE: CPT | Performed by: EMERGENCY MEDICINE

## 2024-07-30 PROCEDURE — 85025 COMPLETE CBC W/AUTO DIFF WBC: CPT | Performed by: EMERGENCY MEDICINE

## 2024-07-30 PROCEDURE — 80053 COMPREHEN METABOLIC PANEL: CPT | Performed by: EMERGENCY MEDICINE

## 2024-07-30 PROCEDURE — 96374 THER/PROPH/DIAG INJ IV PUSH: CPT

## 2024-07-30 PROCEDURE — 82077 ASSAY SPEC XCP UR&BREATH IA: CPT | Performed by: EMERGENCY MEDICINE

## 2024-07-30 RX ORDER — AMLODIPINE BESYLATE 5 MG/1
10 TABLET ORAL ONCE
Status: COMPLETED | OUTPATIENT
Start: 2024-07-30 | End: 2024-07-30

## 2024-07-30 RX ORDER — ATORVASTATIN CALCIUM 20 MG/1
20 TABLET, FILM COATED ORAL ONCE
Status: COMPLETED | OUTPATIENT
Start: 2024-07-30 | End: 2024-07-30

## 2024-07-30 RX ORDER — ASPIRIN 325 MG
325 TABLET ORAL ONCE
Status: COMPLETED | OUTPATIENT
Start: 2024-07-30 | End: 2024-07-30

## 2024-07-30 RX ORDER — LORAZEPAM 2 MG/ML
0.5 INJECTION INTRAMUSCULAR ONCE
Status: COMPLETED | OUTPATIENT
Start: 2024-07-30 | End: 2024-07-30

## 2024-07-30 RX ORDER — ATORVASTATIN CALCIUM 20 MG/1
20 TABLET, FILM COATED ORAL
Status: DISCONTINUED | OUTPATIENT
Start: 2024-07-31 | End: 2024-07-30

## 2024-07-30 RX ADMIN — LORAZEPAM 0.5 MG: 2 INJECTION INTRAMUSCULAR; INTRAVENOUS at 23:44

## 2024-07-30 RX ADMIN — AMLODIPINE BESYLATE 10 MG: 5 TABLET ORAL at 23:43

## 2024-07-30 RX ADMIN — ASPIRIN 325 MG: 325 TABLET ORAL at 23:43

## 2024-07-30 RX ADMIN — ATORVASTATIN CALCIUM 20 MG: 20 TABLET, FILM COATED ORAL at 23:45

## 2024-07-31 ENCOUNTER — APPOINTMENT (EMERGENCY)
Dept: RADIOLOGY | Facility: HOSPITAL | Age: 49
End: 2024-07-31
Payer: COMMERCIAL

## 2024-07-31 VITALS
OXYGEN SATURATION: 98 % | TEMPERATURE: 98.7 F | SYSTOLIC BLOOD PRESSURE: 154 MMHG | HEART RATE: 66 BPM | RESPIRATION RATE: 22 BRPM | DIASTOLIC BLOOD PRESSURE: 96 MMHG

## 2024-07-31 LAB
2HR DELTA HS TROPONIN: 0 NG/L
ATRIAL RATE: 86 BPM
CARDIAC TROPONIN I PNL SERPL HS: 3 NG/L
ETHANOL SERPL-MCNC: <10 MG/DL
P AXIS: 38 DEGREES
PR INTERVAL: 138 MS
QRS AXIS: 65 DEGREES
QRSD INTERVAL: 90 MS
QT INTERVAL: 364 MS
QTC INTERVAL: 435 MS
T WAVE AXIS: 62 DEGREES
VENTRICULAR RATE: 86 BPM

## 2024-07-31 PROCEDURE — 71275 CT ANGIOGRAPHY CHEST: CPT

## 2024-07-31 PROCEDURE — 74174 CTA ABD&PLVS W/CONTRAST: CPT

## 2024-07-31 PROCEDURE — 84484 ASSAY OF TROPONIN QUANT: CPT | Performed by: EMERGENCY MEDICINE

## 2024-07-31 PROCEDURE — 93010 ELECTROCARDIOGRAM REPORT: CPT | Performed by: INTERNAL MEDICINE

## 2024-07-31 PROCEDURE — 99285 EMERGENCY DEPT VISIT HI MDM: CPT | Performed by: EMERGENCY MEDICINE

## 2024-07-31 PROCEDURE — 36415 COLL VENOUS BLD VENIPUNCTURE: CPT | Performed by: EMERGENCY MEDICINE

## 2024-07-31 PROCEDURE — 70450 CT HEAD/BRAIN W/O DYE: CPT

## 2024-07-31 RX ADMIN — IOHEXOL 100 ML: 350 INJECTION, SOLUTION INTRAVENOUS at 00:49

## 2024-07-31 NOTE — DISCHARGE INSTRUCTIONS
We have performed an evaluation of your chest pain in the emergency department and determined that you can be safely discharged home. We have provided you with general information about chest pain in adults. We recommend that you follow up with your primary care provider in the next 5-7 days for further evaluation. If your chest pain changes, worsens, if you have significant associated shortness of breath, palpitations, diaphoresis, persistent nausea and vomiting, or if you pass out, return to the emergency department immediately.

## 2024-07-31 NOTE — ED PROVIDER NOTES
History  Chief Complaint   Patient presents with    Numbness     L sided numbness face and arm about 1 hour pta. States similar thing has happened in past but this is worse     HPI  Patient is a 49-year-old male presenting for evaluation of chest pain, numbness of the left arm and left leg, numbness of the left side of face.  Patient states symptoms started while he was sitting at home.  Patient states that the chest pain is about a 6 out of 10 severity, central, radiating into the left side of his neck.  Patient denies pleuritic component.  Patient denies associated shortness of breath, nausea, vomiting, diaphoresis, syncope or near syncope.  Patient denies dizziness.  Patient denies focal weakness or numbness.  Patient had a similar episode about 6 months ago prompting medical evaluation ultimately with a negative workup however states that symptoms feel somewhat more severe this time.  Patient admits to noncompliance with home medications including metformin and medications for blood pressure.  Patient admits to drinking a few alcoholic beverages earlier in the day, denies illicit drug use.  Prior to Admission Medications   Prescriptions Last Dose Informant Patient Reported? Taking?   Blood Glucose Monitoring Suppl (ONE TOUCH ULTRA 2) w/Device KIT   No No   Sig: Use 3 (three) times a day   Blood Pressure Monitoring (Blood Pressure Monitor/M Cuff) MISC   No No   Sig: Use 2 (two) times a day   Lancets (onetouch ultrasoft) lancets   No No   Sig: Use as instructed   amLODIPine (NORVASC) 10 mg tablet   No No   Sig: Take 1 tablet (10 mg total) by mouth daily   aspirin (ECOTRIN LOW STRENGTH) 81 mg EC tablet   No No   Sig: Take 1 tablet (81 mg total) by mouth daily   atorvastatin (LIPITOR) 20 mg tablet   No No   Sig: Take 1 tablet (20 mg total) by mouth daily   cholecalciferol (VITAMIN D3) 1,000 units tablet   No No   Sig: Take 1 tablet (1,000 Units total) by mouth daily   glucose blood (OneTouch Ultra) test strip   No  No   Sig: Use as instructed   metFORMIN (GLUCOPHAGE-XR) 500 mg 24 hr tablet   No No   Sig: Take 1 tablet (500 mg total) by mouth daily with lunch   nicotine (NICODERM CQ) 21 mg/24 hr TD 24 hr patch   No No   Sig: Place 1 patch on the skin over 24 hours daily   pantoprazole (PROTONIX) 40 mg tablet   No No   Sig: Take 1 tablet (40 mg total) by mouth 2 (two) times a day   terbinafine (LamISIL) 1 % cream   No No   Sig: Apply topically 2 (two) times a day      Facility-Administered Medications: None       Past Medical History:   Diagnosis Date    Chest pain 1/12/2021    Diabetes mellitus (HCC)     Exposure to SARS-associated coronavirus 10/5/2020    Hypertension     Viral upper respiratory tract infection 10/7/2020       History reviewed. No pertinent surgical history.    Family History   Problem Relation Age of Onset    Diabetes Mother     Diabetes Father      I have reviewed and agree with the history as documented.    E-Cigarette/Vaping    E-Cigarette Use Never User      E-Cigarette/Vaping Substances    Nicotine No     THC No     CBD No     Flavoring No     Other No     Unknown No      Social History     Tobacco Use    Smoking status: Every Day     Current packs/day: 1.00     Average packs/day: 0.5 packs/day for 29.4 years (15.0 ttl pk-yrs)     Types: Cigarettes     Start date: 2/18/1995     Passive exposure: Past    Smokeless tobacco: Never   Vaping Use    Vaping status: Never Used   Substance Use Topics    Alcohol use: Yes     Comment: socially    Drug use: Yes     Types: Marijuana       Review of Systems   Constitutional:  Negative for chills, fatigue and fever.   Respiratory:  Negative for cough, chest tightness and shortness of breath.    Cardiovascular:  Positive for chest pain. Negative for palpitations.   Gastrointestinal:  Negative for diarrhea, nausea and vomiting.   Musculoskeletal:  Negative for arthralgias and myalgias.   Neurological:  Positive for numbness. Negative for dizziness, facial asymmetry,  speech difficulty, weakness and light-headedness.   Psychiatric/Behavioral:  Negative for confusion. The patient is nervous/anxious.    All other systems reviewed and are negative.      Physical Exam  Physical Exam  Vitals and nursing note reviewed.   Constitutional:       General: He is not in acute distress.     Appearance: Normal appearance. He is not ill-appearing, toxic-appearing or diaphoretic.      Comments: Anxious appearing but nontoxic nondistressed   HENT:      Head: Normocephalic and atraumatic.      Comments: No external signs of trauma.  No scalp hematoma.  Pupils 3 mm bilaterally, reactive to light, normal EOM.     Right Ear: External ear normal.      Left Ear: External ear normal.   Eyes:      General:         Right eye: No discharge.         Left eye: No discharge.   Cardiovascular:      Comments: Regular rate and rhythm, no murmurs rubs or gallops.  Extremities warm and well-perfused without mottling  Pulmonary:      Effort: No respiratory distress.      Comments: No increased work of breathing.  Speaking in complete sentences.  Lungs clear to auscultation bilaterally without wheezes, rales, rhonchi.  Satting 100% on room air indicating adequate oxygenation  Abdominal:      General: There is no distension.      Comments: Abdomen soft, nontender, nondistended without rigidity, rebound, guarding   Musculoskeletal:         General: No deformity.      Cervical back: Normal range of motion.   Skin:     Findings: No lesion or rash.   Neurological:      Mental Status: He is alert and oriented to person, place, and time. Mental status is at baseline.      Comments: Awake, alert, pleasant, interactive.  No appreciable facial droop.  No dysarthria or word finding difficulty.  No nystagmus.  Stating some mildly diminished sensation to the left side of the face.  Cranial nerves II through XII intact.  Full strength bilaterally in the upper and lower extremities.  Stating diminished sensation to light touch of  the entire left arm and left leg.  Intact sensation of the left side of the thorax.  Normal finger-to-nose.  Normal gait.   Psychiatric:         Mood and Affect: Mood and affect normal.         Vital Signs  ED Triage Vitals   Temperature Pulse Respirations Blood Pressure SpO2   07/30/24 2328 07/30/24 2328 07/30/24 2328 07/30/24 2328 07/30/24 2328   98.7 °F (37.1 °C) 84 18 (!) 180/103 100 %      Temp Source Heart Rate Source Patient Position - Orthostatic VS BP Location FiO2 (%)   07/30/24 2328 07/30/24 2328 07/30/24 2328 07/30/24 2328 --   Oral Monitor Lying Right arm       Pain Score       07/30/24 2332       5           Vitals:    07/31/24 0130 07/31/24 0145 07/31/24 0200 07/31/24 0215   BP: 156/98 157/95 (!) 159/102 154/96   Pulse: 65 66 66 66   Patient Position - Orthostatic VS: Lying Lying Lying Lying         Visual Acuity  Visual Acuity      Flowsheet Row Most Recent Value   L Pupil Size (mm) 3   R Pupil Size (mm) 3            ED Medications  Medications   amLODIPine (NORVASC) tablet 10 mg (10 mg Oral Given 7/30/24 2343)   aspirin tablet 325 mg (325 mg Oral Given 7/30/24 2343)   LORazepam (ATIVAN) injection 0.5 mg (0.5 mg Intravenous Given 7/30/24 2344)   atorvastatin (LIPITOR) tablet 20 mg (20 mg Oral Given 7/30/24 2345)   iohexol (OMNIPAQUE) 350 MG/ML injection (MULTI-DOSE) 100 mL (100 mL Intravenous Given 7/31/24 0049)       Diagnostic Studies  Results Reviewed       Procedure Component Value Units Date/Time    HS Troponin I 2hr [[id number]]  (Normal) Collected: 07/31/24 0116    Lab Status: Final result Specimen: Blood from Arm, Left Updated: 07/31/24 0143     hs TnI 2hr 3 ng/L      Delta 2hr hsTnI 0 ng/L     Ethanol [[id number]]  (Normal) Collected: 07/30/24 2338    Lab Status: Final result Specimen: Blood from Arm, Left Updated: 07/31/24 0002     Ethanol Lvl <10 mg/dL     HS Troponin 0hr (reflex protocol) [[id number]]  (Normal) Collected: 07/30/24 7539    Lab Status: Final result Specimen: Blood from Arm,  Left Updated: 07/30/24 2359     hs TnI 0hr 3 ng/L     Comprehensive metabolic panel [[id number]]  (Abnormal) Collected: 07/30/24 2329    Lab Status: Final result Specimen: Blood from Arm, Left Updated: 07/30/24 2356     Sodium 138 mmol/L      Potassium 3.2 mmol/L      Chloride 103 mmol/L      CO2 29 mmol/L      ANION GAP 6 mmol/L      BUN 9 mg/dL      Creatinine 0.96 mg/dL      Glucose 111 mg/dL      Calcium 9.3 mg/dL      AST 17 U/L      ALT 25 U/L      Alkaline Phosphatase 90 U/L      Total Protein 7.9 g/dL      Albumin 4.4 g/dL      Total Bilirubin 0.38 mg/dL      eGFR 93 ml/min/1.73sq m     Narrative:      [employer] Kid[employer] Foundation guidelines for Chronic Kidney Disease (CKD):     Stage 1 with normal or high GFR (GFR > 90 mL/min/1.73 square meters)    Stage 2 Mild CKD (GFR = 60-89 mL/min/1.73 square meters)    Stage 3A Moderate CKD (GFR = 45-59 mL/min/1.73 square meters)    Stage 3B Moderate CKD (GFR = 30-44 mL/min/1.73 square meters)    Stage 4 Severe CKD (GFR = 15-29 mL/min/1.73 square meters)    Stage 5 End Stage CKD (GFR <15 mL/min/1.73 square meters)  Note: GFR calculation is accurate only with a steady state creatinine    CBC and differential [[id number]]  (Abnormal) Collected: 07/30/24 2329    Lab Status: Final result Specimen: Blood from Arm, Left Updated: 07/30/24 2335     WBC 8.66 Thousand/uL      RBC 5.04 Million/uL      Hemoglobin 13.4 g/dL      Hematocrit 41.4 %      MCV 82 fL      MCH 26.6 pg      MCHC 32.4 g/dL      RDW 15.9 %      MPV 9.2 fL      Platelets 265 Thousands/uL      nRBC 0 /100 WBCs      Segmented % 37 %      Immature Grans % 1 %      Lymphocytes % 48 %      Monocytes % 12 %      Eosinophils Relative 1 %      Basophils Relative 1 %      Absolute Neutrophils 3.19 Thousands/µL      Absolute Immature Grans 0.05 Thousand/uL      Absolute Lymphocytes 4.21 Thousands/µL      Absolute Monocytes 1.04 Thousand/µL      Eosinophils Absolute 0.10 Thousand/µL      Basophils Absolute 0.07  Thousands/µL     Fingerstick Glucose (POCT) [[id number]]  (Normal) Collected: 07/30/24 2329    Lab Status: Final result Specimen: Blood Updated: 07/30/24 2329     POC Glucose 100 mg/dl                    CT head without contrast    (Results Pending)   CTA dissection protocol chest/abdomen/pelvis    (Results Pending)              Procedures  ECG 12 Lead Documentation Only    Date/Time: 7/31/2024 2:23 AM    Performed by: Agustin Alejo MD  Authorized by: Agustin Aeljo MD    Indications / Diagnosis:  Chest pain  ECG reviewed by me, the ED Provider: yes    Patient location:  ED  Interpretation:     Interpretation: normal    Rate:     ECG rate:  86    ECG rate assessment: normal    Rhythm:     Rhythm: sinus rhythm    Ectopy:     Ectopy: none    QRS:     QRS axis:  Normal    QRS intervals:  Normal  Conduction:     Conduction: normal    ST segments:     ST segments:  Normal  T waves:     T waves: normal             ED Course               HEART Risk Score      Flowsheet Row Most Recent Value   Heart Score Risk Calculator    History 0 Filed at: 07/31/2024 0211   ECG 0 Filed at: 07/31/2024 0211   Age 1 Filed at: 07/31/2024 0211   Risk Factors 1 Filed at: 07/31/2024 0211   Troponin 0 Filed at: 07/31/2024 0211   HEART Score 2 Filed at: 07/31/2024 0211                                        Medical Decision Making  I obtained history from the patient.  I reviewed external medical documentation.  Patient with a similar presentation on 1/8/2024.  Patient had a negative dissection at that study, was admitted on the stroke pathway and had a normal MRI.    Patient's distribution of diminished sensation of the left arm, leg, left side of face sparing the thorax not following typical neuroanatomical distribution.  Given patient's chest pain and numbness, differential diagnosis includes was not limited to dissection, pneumonia, pneumothorax, atypical presentation of ACS, musculoskeletal pain, anxiety, somatizations  disorder.  I ordered and reviewed lab work including CBC, CMP, troponin, ethanol, POCT fingerstick glucose.  I ordered and reviewed a CTA dissection study and a CT head.  Patient with no imaging findings to explain symptoms.  On reassessment, resolution of chest pain and neurologic symptoms.  Given patient's prior admission with similar symptoms and negative MRI, described distribution of numbness not corresponding to neuroanatomy, at this point do not strongly suspect central nervous etiology.  Instructed to comply with home medications, follow-up with primary care provider in the next 1 to 2 weeks, provided with reassurance, discharged with return precautions.    Amount and/or Complexity of Data Reviewed  Labs: ordered.  Radiology: ordered.    Risk  OTC drugs.  Prescription drug management.                 Disposition  Final diagnoses:   Paresthesias   Chest pain     Time reflects when diagnosis was documented in both MDM as applicable and the Disposition within this note       Time User Action Codes Description Comment    7/31/2024  2:12 AM Agustin Alejo Add [R20.2] Paresthesias     7/31/2024  2:12 AM Agustin Alejo Add [R07.9] Chest pain           ED Disposition       ED Disposition   Discharge    Condition   Stable    Date/Time   Wed Jul 31, 2024 0212    Comment   Laron Gordon discharge to home/self care.                   Follow-up Information       Follow up With Specialties Details Why Contact Info Additional Information    [facility] Emergency Department Emergency Medicine  If symptoms worsen [address] [facility] [facility] [address]  [phone] [facility], [address], [place], [address]            Discharge Medication List as of 7/31/2024  2:12 AM        CONTINUE these medications which have NOT CHANGED    Details   amLODIPine (NORVASC) 10 mg tablet Take 1 tablet (10 mg total) by mouth daily, Starting Wed  1/10/2024, Until Tue 4/9/2024, Normal      aspirin (ECOTRIN LOW STRENGTH) 81 mg EC tablet Take 1 tablet (81 mg total) by mouth daily, Starting Wed 1/10/2024, Normal      atorvastatin (LIPITOR) 20 mg tablet Take 1 tablet (20 mg total) by mouth daily, Starting Wed 1/10/2024, Normal      Blood Glucose Monitoring Suppl (ONE TOUCH ULTRA 2) w/Device KIT Use 3 (three) times a day, Starting Thu 12/14/2023, Normal      Blood Pressure Monitoring (Blood Pressure Monitor/M Cuff) MISC Use 2 (two) times a day, Starting Thu 12/14/2023, Normal      cholecalciferol (VITAMIN D3) 1,000 units tablet Take 1 tablet (1,000 Units total) by mouth daily, Starting Fri 1/12/2024, Normal      glucose blood (OneTouch Ultra) test strip Use as instructed, Normal      Lancets (onetouch ultrasoft) lancets Use as instructed, Normal      metFORMIN (GLUCOPHAGE-XR) 500 mg 24 hr tablet Take 1 tablet (500 mg total) by mouth daily with lunch, Starting Wed 1/10/2024, Normal      nicotine (NICODERM CQ) 21 mg/24 hr TD 24 hr patch Place 1 patch on the skin over 24 hours daily, Starting Fri 7/12/2024, Normal      pantoprazole (PROTONIX) 40 mg tablet Take 1 tablet (40 mg total) by mouth 2 (two) times a day, Starting Tue 1/9/2024, Normal      terbinafine (LamISIL) 1 % cream Apply topically 2 (two) times a day, Starting Fri 1/12/2024, Normal             No discharge procedures on file.    PDMP Review         Value Time User    PDMP Reviewed  Yes 6/12/2024 11:40 PM Castro Belcher MD            ED Provider  Electronically Signed by             Agustin Alejo MD  07/31/24 0224

## 2024-09-26 ENCOUNTER — APPOINTMENT (EMERGENCY)
Dept: RADIOLOGY | Facility: HOSPITAL | Age: 49
End: 2024-09-26
Payer: COMMERCIAL

## 2024-09-26 ENCOUNTER — HOSPITAL ENCOUNTER (EMERGENCY)
Facility: HOSPITAL | Age: 49
Discharge: HOME | End: 2024-09-26
Attending: EMERGENCY MEDICINE
Payer: COMMERCIAL

## 2024-09-26 VITALS
TEMPERATURE: 99.3 F | SYSTOLIC BLOOD PRESSURE: 152 MMHG | WEIGHT: 179 LBS | HEART RATE: 67 BPM | OXYGEN SATURATION: 97 % | BODY MASS INDEX: 24.28 KG/M2 | DIASTOLIC BLOOD PRESSURE: 89 MMHG | RESPIRATION RATE: 18 BRPM

## 2024-09-26 DIAGNOSIS — R51.9 HEADACHE: ICD-10-CM

## 2024-09-26 DIAGNOSIS — R07.9 CHEST PAIN, UNSPECIFIED: Primary | ICD-10-CM

## 2024-09-26 LAB
2HR DELTA HS TROPONIN: 2 NG/L
ALBUMIN SERPL BCG-MCNC: 4.6 G/DL (ref 3.5–5)
ALP SERPL-CCNC: 86 U/L (ref 34–104)
ALT SERPL W P-5'-P-CCNC: 25 U/L (ref 7–52)
ANION GAP SERPL CALCULATED.3IONS-SCNC: 10 MMOL/L (ref 4–13)
AST SERPL W P-5'-P-CCNC: 26 U/L (ref 13–39)
ATRIAL RATE: 103 BPM
BASOPHILS # BLD AUTO: 0.05 THOUSANDS/ΜL (ref 0–0.1)
BASOPHILS NFR BLD AUTO: 1 % (ref 0–1)
BILIRUB SERPL-MCNC: 0.44 MG/DL (ref 0.2–1)
BUN SERPL-MCNC: 7 MG/DL (ref 5–25)
CALCIUM SERPL-MCNC: 9.5 MG/DL (ref 8.4–10.2)
CARDIAC TROPONIN I PNL SERPL HS: 7 NG/L
CARDIAC TROPONIN I PNL SERPL HS: 9 NG/L
CHLORIDE SERPL-SCNC: 100 MMOL/L (ref 96–108)
CO2 SERPL-SCNC: 27 MMOL/L (ref 21–32)
CREAT SERPL-MCNC: 1.06 MG/DL (ref 0.6–1.3)
D DIMER PPP FEU-MCNC: <0.27 UG/ML FEU
EOSINOPHIL # BLD AUTO: 0.04 THOUSAND/ΜL (ref 0–0.61)
EOSINOPHIL NFR BLD AUTO: 1 % (ref 0–6)
ERYTHROCYTE [DISTWIDTH] IN BLOOD BY AUTOMATED COUNT: 17.2 % (ref 11.6–15.1)
GFR SERPL CREATININE-BSD FRML MDRD: 82 ML/MIN/1.73SQ M
GLUCOSE SERPL-MCNC: 115 MG/DL (ref 65–140)
HCT VFR BLD AUTO: 46.3 % (ref 36.5–49.3)
HGB BLD-MCNC: 15 G/DL (ref 12–17)
IMM GRANULOCYTES # BLD AUTO: 0.04 THOUSAND/UL (ref 0–0.2)
IMM GRANULOCYTES NFR BLD AUTO: 1 % (ref 0–2)
LYMPHOCYTES # BLD AUTO: 2.15 THOUSANDS/ΜL (ref 0.6–4.47)
LYMPHOCYTES NFR BLD AUTO: 32 % (ref 14–44)
MAGNESIUM SERPL-MCNC: 2 MG/DL (ref 1.9–2.7)
MCH RBC QN AUTO: 27.6 PG (ref 26.8–34.3)
MCHC RBC AUTO-ENTMCNC: 32.4 G/DL (ref 31.4–37.4)
MCV RBC AUTO: 85 FL (ref 82–98)
MONOCYTES # BLD AUTO: 0.77 THOUSAND/ΜL (ref 0.17–1.22)
MONOCYTES NFR BLD AUTO: 12 % (ref 4–12)
NEUTROPHILS # BLD AUTO: 3.63 THOUSANDS/ΜL (ref 1.85–7.62)
NEUTS SEG NFR BLD AUTO: 53 % (ref 43–75)
NRBC BLD AUTO-RTO: 0 /100 WBCS
P AXIS: 76 DEGREES
PLATELET # BLD AUTO: 294 THOUSANDS/UL (ref 149–390)
PMV BLD AUTO: 9.6 FL (ref 8.9–12.7)
POTASSIUM SERPL-SCNC: 3 MMOL/L (ref 3.5–5.3)
PR INTERVAL: 180 MS
PROT SERPL-MCNC: 8.3 G/DL (ref 6.4–8.4)
QRS AXIS: 67 DEGREES
QRSD INTERVAL: 92 MS
QT INTERVAL: 360 MS
QTC INTERVAL: 471 MS
RBC # BLD AUTO: 5.44 MILLION/UL (ref 3.88–5.62)
SODIUM SERPL-SCNC: 137 MMOL/L (ref 135–147)
T WAVE AXIS: 64 DEGREES
VENTRICULAR RATE: 103 BPM
WBC # BLD AUTO: 6.68 THOUSAND/UL (ref 4.31–10.16)

## 2024-09-26 PROCEDURE — 99285 EMERGENCY DEPT VISIT HI MDM: CPT

## 2024-09-26 PROCEDURE — 80053 COMPREHEN METABOLIC PANEL: CPT | Performed by: EMERGENCY MEDICINE

## 2024-09-26 PROCEDURE — 71045 X-RAY EXAM CHEST 1 VIEW: CPT

## 2024-09-26 PROCEDURE — 85379 FIBRIN DEGRADATION QUANT: CPT | Performed by: EMERGENCY MEDICINE

## 2024-09-26 PROCEDURE — 70450 CT HEAD/BRAIN W/O DYE: CPT

## 2024-09-26 PROCEDURE — 93010 ELECTROCARDIOGRAM REPORT: CPT | Performed by: INTERNAL MEDICINE

## 2024-09-26 PROCEDURE — 99285 EMERGENCY DEPT VISIT HI MDM: CPT | Performed by: EMERGENCY MEDICINE

## 2024-09-26 PROCEDURE — 96361 HYDRATE IV INFUSION ADD-ON: CPT

## 2024-09-26 PROCEDURE — 96374 THER/PROPH/DIAG INJ IV PUSH: CPT

## 2024-09-26 PROCEDURE — 93005 ELECTROCARDIOGRAM TRACING: CPT

## 2024-09-26 PROCEDURE — 84484 ASSAY OF TROPONIN QUANT: CPT | Performed by: EMERGENCY MEDICINE

## 2024-09-26 PROCEDURE — 85025 COMPLETE CBC W/AUTO DIFF WBC: CPT | Performed by: EMERGENCY MEDICINE

## 2024-09-26 PROCEDURE — 83735 ASSAY OF MAGNESIUM: CPT | Performed by: EMERGENCY MEDICINE

## 2024-09-26 PROCEDURE — 36415 COLL VENOUS BLD VENIPUNCTURE: CPT | Performed by: EMERGENCY MEDICINE

## 2024-09-26 PROCEDURE — 96375 TX/PRO/DX INJ NEW DRUG ADDON: CPT

## 2024-09-26 RX ORDER — METOCLOPRAMIDE HYDROCHLORIDE 5 MG/ML
10 INJECTION INTRAMUSCULAR; INTRAVENOUS ONCE
Status: COMPLETED | OUTPATIENT
Start: 2024-09-26 | End: 2024-09-26

## 2024-09-26 RX ORDER — DEXAMETHASONE SODIUM PHOSPHATE 10 MG/ML
10 INJECTION, SOLUTION INTRAMUSCULAR; INTRAVENOUS ONCE
Status: COMPLETED | OUTPATIENT
Start: 2024-09-26 | End: 2024-09-26

## 2024-09-26 RX ADMIN — METOCLOPRAMIDE 10 MG: 5 INJECTION, SOLUTION INTRAMUSCULAR; INTRAVENOUS at 13:08

## 2024-09-26 RX ADMIN — SODIUM CHLORIDE 1000 ML: 0.9 INJECTION, SOLUTION INTRAVENOUS at 13:03

## 2024-09-26 RX ADMIN — DEXAMETHASONE SODIUM PHOSPHATE 10 MG: 10 INJECTION, SOLUTION INTRAMUSCULAR; INTRAVENOUS at 13:05

## 2024-09-26 NOTE — Clinical Note
Laron Gordon was seen and treated in our emergency department on 9/26/2024.                Diagnosis:     Laron  may return to work on return date.    He may return on this date: 09/27/2024         If you have any questions or concerns, please don't hesitate to call.      Sosa Avery, DO    ______________________________           _______________          _______________  Hospital Representative                              Date                                Time

## 2024-09-26 NOTE — ED PROVIDER NOTES
Final diagnoses:   Chest pain, unspecified   Headache     ED Disposition       ED Disposition   Discharge    Condition   Stable    Date/Time   Thu Sep 26, 2024  3:13 PM    Comment   Laron Gordon discharge to home/self care.                   Assessment & Plan       Medical Decision Making  49-year-old male in the ED with complaint of chest pain and a headache.  Differential diagnosis includes but is not limited to ACS, musculoskeletal pain.  Labs, EKG, chest x-ray and head CT ordered and reviewed.  Patient's migraine headache resolved with migraine cocktail.  Heart score is 3.  Delta troponin is 2, low concern for ACS.  Will discharge patient to home with outpatient follow-up with primary care physician.  Patient agrees with plan.    Amount and/or Complexity of Data Reviewed  Labs: ordered.  Radiology: ordered.    Risk  Prescription drug management.             Medications   dexamethasone (PF) (DECADRON) injection 10 mg (10 mg Intravenous Given 9/26/24 1305)   metoclopramide (REGLAN) injection 10 mg (10 mg Intravenous Given 9/26/24 1308)   sodium chloride 0.9 % bolus 1,000 mL (0 mL Intravenous Stopped 9/26/24 1421)       ED Risk Strat Scores   HEART Risk Score      Flowsheet Row Most Recent Value   Heart Score Risk Calculator    History 1 Filed at: 09/26/2024 1518   ECG 0 Filed at: 09/26/2024 1518   Age 1 Filed at: 09/26/2024 1518   Risk Factors 1 Filed at: 09/26/2024 1518   Troponin 0 Filed at: 09/26/2024 1518   HEART Score 3 Filed at: 09/26/2024 1518                               SBIRT 22yo+      Flowsheet Row Most Recent Value   Initial Alcohol Screen: US AUDIT-C     1. How often do you have a drink containing alcohol? 3 Filed at: 09/26/2024 1208   2. How many drinks containing alcohol do you have on a typical day you are drinking?  4 Filed at: 09/26/2024 1208   3a. Male UNDER 65: How often do you have five or more drinks on one occasion? 3 Filed at: 09/26/2024 1208   3b. FEMALE Any Age, or MALE 65+: How often  do you have 4 or more drinks on one occassion? 0 Filed at: 09/26/2024 1208   Audit-C Score 10 Filed at: 09/26/2024 1208                            History of Present Illness       Chief Complaint   Patient presents with    Chest Pain     Chest pain and neck pain since last night.        Past Medical History:   Diagnosis Date    Chest pain 1/12/2021    Diabetes mellitus (HCC)     Exposure to SARS-associated coronavirus 10/5/2020    Hypertension     Viral upper respiratory tract infection 10/7/2020      History reviewed. No pertinent surgical history.   Family History   Problem Relation Age of Onset    Diabetes Mother     Diabetes Father       Social History     Tobacco Use    Smoking status: Every Day     Current packs/day: 1.00     Average packs/day: 0.5 packs/day for 29.6 years (15.2 ttl pk-yrs)     Types: Cigarettes     Start date: 2/18/1995     Passive exposure: Past    Smokeless tobacco: Never   Vaping Use    Vaping status: Never Used   Substance Use Topics    Alcohol use: Yes     Comment: socially    Drug use: Yes     Types: Marijuana      E-Cigarette/Vaping    E-Cigarette Use Never User       E-Cigarette/Vaping Substances    Nicotine No     THC No     CBD No     Flavoring No     Other No     Unknown No       I have reviewed and agree with the history as documented.     Patient is a 49-year-old male that presents emergency department with complaint of left-sided chest pain that began yesterday morning.  He describes the pain as constant and nonradiating.  He also complains of no headache and twitching in his left eye.  He has not taken any medication for relief.      History provided by:  Patient   used: No        Review of Systems   Constitutional:  Negative for chills and fever.   Respiratory:  Negative for cough, shortness of breath and wheezing.    Cardiovascular:  Positive for chest pain. Negative for palpitations.   Gastrointestinal:  Negative for abdominal pain, constipation,  diarrhea, nausea and vomiting.   Genitourinary:  Negative for dysuria, flank pain, hematuria and urgency.   Musculoskeletal:  Negative for back pain.   Skin:  Negative for color change and rash.   Neurological:  Positive for headaches.   All other systems reviewed and are negative.          Objective       ED Triage Vitals   Temperature Pulse Blood Pressure Respirations SpO2 Patient Position - Orthostatic VS   09/26/24 1206 09/26/24 1206 09/26/24 1206 09/26/24 1206 09/26/24 1206 09/26/24 1215   99.3 °F (37.4 °C) (!) 116 (!) 165/111 20 99 % Lying      Temp src Heart Rate Source BP Location FiO2 (%) Pain Score    -- 09/26/24 1215 09/26/24 1215 -- 09/26/24 1206     Monitor Left arm  7      Vitals      Date and Time Temp Pulse SpO2 Resp BP Pain Score FACES Pain Rating User   09/26/24 1530 -- 67 97 % 18 152/89 -- --    09/26/24 1500 -- 68 98 % 19 159/91 -- --    09/26/24 1430 -- 70 98 % 21 155/89 -- --    09/26/24 1400 -- 71 99 % 13 149/86 -- --    09/26/24 1330 -- 73 98 % 15 142/86 -- --    09/26/24 1315 -- 95 99 % 14 160/99 -- --    09/26/24 1245 -- 96 99 % 20 175/102 -- --    09/26/24 1230 -- 97 99 % 19 181/100 -- --    09/26/24 1215 -- 102 99 % 19 186/101 -- --    09/26/24 1206 99.3 °F (37.4 °C) 116 99 % 20 165/111 7 -- KENDALL            Physical Exam  Vitals and nursing note reviewed.   Constitutional:       Appearance: He is well-developed.   HENT:      Head: Normocephalic and atraumatic.   Eyes:      Pupils: Pupils are equal, round, and reactive to light.   Cardiovascular:      Rate and Rhythm: Normal rate and regular rhythm.      Heart sounds: Normal heart sounds.   Pulmonary:      Effort: Pulmonary effort is normal.      Breath sounds: Normal breath sounds.   Abdominal:      General: Bowel sounds are normal. There is no distension.      Palpations: Abdomen is soft. There is no mass.      Tenderness: There is no abdominal tenderness. There is no guarding or rebound.   Musculoskeletal:          General: Normal range of motion.   Skin:     General: Skin is warm and dry.      Capillary Refill: Capillary refill takes less than 2 seconds.   Neurological:      General: No focal deficit present.      Mental Status: He is alert and oriented to person, place, and time.   Psychiatric:         Mood and Affect: Mood is anxious.         Behavior: Behavior normal.         Thought Content: Thought content normal.         Judgment: Judgment normal.         Results Reviewed       Procedure Component Value Units Date/Time    HS Troponin I 2hr [[id number]]  (Normal) Collected: 09/26/24 1427    Lab Status: Final result Specimen: Blood from Arm, Right Updated: 09/26/24 1512     hs TnI 2hr 9 ng/L      Delta 2hr hsTnI 2 ng/L     D-Dimer [[id number]]  (Normal) Collected: 09/26/24 1239    Lab Status: Final result Specimen: Blood from Arm, Right Updated: 09/26/24 1316     D-Dimer, Quant <0.27 ug/ml FEU     HS Troponin 0hr (reflex protocol) [[id number]]  (Normal) Collected: 09/26/24 1239    Lab Status: Final result Specimen: Blood from Arm, Right Updated: 09/26/24 1312     hs TnI 0hr 7 ng/L     Comprehensive metabolic panel [[id number]]  (Abnormal) Collected: 09/26/24 1239    Lab Status: Final result Specimen: Blood from Arm, Right Updated: 09/26/24 1304     Sodium 137 mmol/L      Potassium 3.0 mmol/L      Chloride 100 mmol/L      CO2 27 mmol/L      ANION GAP 10 mmol/L      BUN 7 mg/dL      Creatinine 1.06 mg/dL      Glucose 115 mg/dL      Calcium 9.5 mg/dL      AST 26 U/L      ALT 25 U/L      Alkaline Phosphatase 86 U/L      Total Protein 8.3 g/dL      Albumin 4.6 g/dL      Total Bilirubin 0.44 mg/dL      eGFR 82 ml/min/1.73sq m     Narrative:      [employer] guidelines for Chronic Kidney Disease (CKD):     Stage 1 with normal or high GFR (GFR > 90 mL/min/1.73 square meters)    Stage 2 Mild CKD (GFR = 60-89 mL/min/1.73 square meters)    Stage 3A Moderate CKD (GFR = 45-59 mL/min/1.73 square meters)    Stage 3B  Moderate CKD (GFR = 30-44 mL/min/1.73 square meters)    Stage 4 Severe CKD (GFR = 15-29 mL/min/1.73 square meters)    Stage 5 End Stage CKD (GFR <15 mL/min/1.73 square meters)  Note: GFR calculation is accurate only with a steady state creatinine    Magnesium [[id number]]  (Normal) Collected: 09/26/24 1239    Lab Status: Final result Specimen: Blood from Arm, Right Updated: 09/26/24 1304     Magnesium 2.0 mg/dL     CBC and differential [[id number]]  (Abnormal) Collected: 09/26/24 1239    Lab Status: Final result Specimen: Blood from Arm, Right Updated: 09/26/24 1245     WBC 6.68 Thousand/uL      RBC 5.44 Million/uL      Hemoglobin 15.0 g/dL      Hematocrit 46.3 %      MCV 85 fL      MCH 27.6 pg      MCHC 32.4 g/dL      RDW 17.2 %      MPV 9.6 fL      Platelets 294 Thousands/uL      nRBC 0 /100 WBCs      Segmented % 53 %      Immature Grans % 1 %      Lymphocytes % 32 %      Monocytes % 12 %      Eosinophils Relative 1 %      Basophils Relative 1 %      Absolute Neutrophils 3.63 Thousands/µL      Absolute Immature Grans 0.04 Thousand/uL      Absolute Lymphocytes 2.15 Thousands/µL      Absolute Monocytes 0.77 Thousand/µL      Eosinophils Absolute 0.04 Thousand/µL      Basophils Absolute 0.05 Thousands/µL             XR chest 1 view portable   Final Interpretation by Luis Armando Brown MD (09/26 1491)      No acute cardiopulmonary disease.            Workstation performed: [id number]         CT head without contrast   Final Interpretation by Austyn Arreaga MD (09/26 9880)      No acute intracranial abnormality.                  Workstation performed: [id number]             ECG 12 Lead Documentation Only    Date/Time: 9/26/2024 12:15 PM    Performed by: Sosa Avery DO  Authorized by: Sosa Avery DO    Indications / Diagnosis:  Cp  ECG reviewed by me, the ED Provider: yes    Patient location:  ED  Previous ECG:     Previous ECG:  Unavailable    Comparison to cardiac monitor: Yes    Interpretation:      Interpretation: non-specific    Rate:     ECG rate:  103bpm    ECG rate assessment: tachycardic    Rhythm:     Rhythm: sinus tachycardia    Ectopy:     Ectopy: none    QRS:     QRS axis:  Normal  Conduction:     Conduction: normal    T waves:     T waves: normal        ED Medication and Procedure Management   Prior to Admission Medications   Prescriptions Last Dose Informant Patient Reported? Taking?   Blood Glucose Monitoring Suppl (ONE TOUCH ULTRA 2) w/Device KIT   No No   Sig: Use 3 (three) times a day   Blood Pressure Monitoring (Blood Pressure Monitor/M Cuff) MISC   No No   Sig: Use 2 (two) times a day   Lancets (onetouch ultrasoft) lancets   No No   Sig: Use as instructed   amLODIPine (NORVASC) 10 mg tablet   No No   Sig: Take 1 tablet (10 mg total) by mouth daily   aspirin (ECOTRIN LOW STRENGTH) 81 mg EC tablet   No No   Sig: Take 1 tablet (81 mg total) by mouth daily   atorvastatin (LIPITOR) 20 mg tablet   No No   Sig: Take 1 tablet (20 mg total) by mouth daily   cholecalciferol (VITAMIN D3) 1,000 units tablet   No No   Sig: Take 1 tablet (1,000 Units total) by mouth daily   glucose blood (OneTouch Ultra) test strip   No No   Sig: Use as instructed   metFORMIN (GLUCOPHAGE-XR) 500 mg 24 hr tablet   No No   Sig: Take 1 tablet (500 mg total) by mouth daily with lunch   nicotine (NICODERM CQ) 21 mg/24 hr TD 24 hr patch   No No   Sig: Place 1 patch on the skin over 24 hours daily   pantoprazole (PROTONIX) 40 mg tablet   No No   Sig: Take 1 tablet (40 mg total) by mouth 2 (two) times a day   terbinafine (LamISIL) 1 % cream   No No   Sig: Apply topically 2 (two) times a day      Facility-Administered Medications: None     Discharge Medication List as of 9/26/2024  3:15 PM        CONTINUE these medications which have NOT CHANGED    Details   amLODIPine (NORVASC) 10 mg tablet Take 1 tablet (10 mg total) by mouth daily, Starting Wed 1/10/2024, Until Tue 4/9/2024, Normal      aspirin (ECOTRIN LOW STRENGTH) 81 mg EC  tablet Take 1 tablet (81 mg total) by mouth daily, Starting Wed 1/10/2024, Normal      atorvastatin (LIPITOR) 20 mg tablet Take 1 tablet (20 mg total) by mouth daily, Starting Wed 1/10/2024, Normal      Blood Glucose Monitoring Suppl (ONE TOUCH ULTRA 2) w/Device KIT Use 3 (three) times a day, Starting Thu 12/14/2023, Normal      Blood Pressure Monitoring (Blood Pressure Monitor/M Cuff) MISC Use 2 (two) times a day, Starting Thu 12/14/2023, Normal      cholecalciferol (VITAMIN D3) 1,000 units tablet Take 1 tablet (1,000 Units total) by mouth daily, Starting Fri 1/12/2024, Normal      glucose blood (OneTouch Ultra) test strip Use as instructed, Normal      Lancets (onetouch ultrasoft) lancets Use as instructed, Normal      metFORMIN (GLUCOPHAGE-XR) 500 mg 24 hr tablet Take 1 tablet (500 mg total) by mouth daily with lunch, Starting Wed 1/10/2024, Normal      nicotine (NICODERM CQ) 21 mg/24 hr TD 24 hr patch Place 1 patch on the skin over 24 hours daily, Starting Fri 7/12/2024, Normal      pantoprazole (PROTONIX) 40 mg tablet Take 1 tablet (40 mg total) by mouth 2 (two) times a day, Starting Tue 1/9/2024, Normal      terbinafine (LamISIL) 1 % cream Apply topically 2 (two) times a day, Starting Fri 1/12/2024, Normal           No discharge procedures on file.  ED SEPSIS DOCUMENTATION   Time reflects when diagnosis was documented in both MDM as applicable and the Disposition within this note       Time User Action Codes Description Comment    9/26/2024  3:13 PM Oyesanmi, Olubusola O Add [R07.9] Chest pain, unspecified     9/26/2024  3:13 PM Oyesanmi, Olubusola O Add [R51.9] Headache                  Olubusola O Oyesanmi, DO  09/28/24 [id number]

## 2024-09-26 NOTE — DISCHARGE INSTRUCTIONS
Return to the ER for further concerns or worsening symptoms  Follow up with your primary care physician and cardiology in 1-2 days

## 2024-11-04 ENCOUNTER — TELEPHONE (OUTPATIENT)
Age: 49
End: 2024-11-04

## 2024-11-04 NOTE — TELEPHONE ENCOUNTER
Called and spoke to patient regarding insurance for upcoming appointment. Patient decided to cancel

## 2024-11-12 ENCOUNTER — HOSPITAL ENCOUNTER (EMERGENCY)
Facility: HOSPITAL | Age: 49
Discharge: HOME | End: 2024-11-12
Attending: EMERGENCY MEDICINE

## 2024-11-12 ENCOUNTER — APPOINTMENT (EMERGENCY)
Dept: CT IMAGING | Facility: HOSPITAL | Age: 49
End: 2024-11-12

## 2024-11-12 VITALS
TEMPERATURE: 98.7 F | HEART RATE: 63 BPM | HEIGHT: 71 IN | WEIGHT: 178 LBS | DIASTOLIC BLOOD PRESSURE: 87 MMHG | RESPIRATION RATE: 15 BRPM | SYSTOLIC BLOOD PRESSURE: 150 MMHG | BODY MASS INDEX: 24.92 KG/M2 | OXYGEN SATURATION: 99 %

## 2024-11-12 DIAGNOSIS — G43.909 MIGRAINE: Primary | ICD-10-CM

## 2024-11-12 DIAGNOSIS — R20.2 PARESTHESIAS: ICD-10-CM

## 2024-11-12 LAB
2HR DELTA HS TROPONIN: 0 NG/L
ALBUMIN SERPL BCG-MCNC: 4.8 G/DL (ref 3.5–5)
ALP SERPL-CCNC: 90 U/L (ref 34–104)
ALT SERPL W P-5'-P-CCNC: 37 U/L (ref 7–52)
ANION GAP SERPL CALCULATED.3IONS-SCNC: 10 MMOL/L (ref 4–13)
AST SERPL W P-5'-P-CCNC: 36 U/L (ref 13–39)
BASOPHILS # BLD AUTO: 0.07 THOUSANDS/ΜL (ref 0–0.1)
BASOPHILS NFR BLD AUTO: 1 % (ref 0–1)
BILIRUB SERPL-MCNC: 0.55 MG/DL (ref 0.2–1)
BUN SERPL-MCNC: 7 MG/DL (ref 5–25)
CALCIUM SERPL-MCNC: 9.7 MG/DL (ref 8.4–10.2)
CARDIAC TROPONIN I PNL SERPL HS: 4 NG/L
CARDIAC TROPONIN I PNL SERPL HS: 4 NG/L
CHLORIDE SERPL-SCNC: 97 MMOL/L (ref 96–108)
CO2 SERPL-SCNC: 30 MMOL/L (ref 21–32)
CREAT SERPL-MCNC: 0.96 MG/DL (ref 0.6–1.3)
EOSINOPHIL # BLD AUTO: 0.02 THOUSAND/ΜL (ref 0–0.61)
EOSINOPHIL NFR BLD AUTO: 0 % (ref 0–6)
ERYTHROCYTE [DISTWIDTH] IN BLOOD BY AUTOMATED COUNT: 15 % (ref 11.6–15.1)
ERYTHROCYTE [SEDIMENTATION RATE] IN BLOOD: 16 MM/HOUR (ref 0–14)
GFR SERPL CREATININE-BSD FRML MDRD: 93 ML/MIN/1.73SQ M
GLUCOSE SERPL-MCNC: 124 MG/DL (ref 65–140)
HCT VFR BLD AUTO: 46.1 % (ref 36.5–49.3)
HGB BLD-MCNC: 14.9 G/DL (ref 12–17)
IMM GRANULOCYTES # BLD AUTO: 0.03 THOUSAND/UL (ref 0–0.2)
IMM GRANULOCYTES NFR BLD AUTO: 0 % (ref 0–2)
LYMPHOCYTES # BLD AUTO: 1.91 THOUSANDS/ΜL (ref 0.6–4.47)
LYMPHOCYTES NFR BLD AUTO: 26 % (ref 14–44)
MAGNESIUM SERPL-MCNC: 2 MG/DL (ref 1.9–2.7)
MCH RBC QN AUTO: 27.8 PG (ref 26.8–34.3)
MCHC RBC AUTO-ENTMCNC: 32.3 G/DL (ref 31.4–37.4)
MCV RBC AUTO: 86 FL (ref 82–98)
MONOCYTES # BLD AUTO: 0.63 THOUSAND/ΜL (ref 0.17–1.22)
MONOCYTES NFR BLD AUTO: 9 % (ref 4–12)
NEUTROPHILS # BLD AUTO: 4.68 THOUSANDS/ΜL (ref 1.85–7.62)
NEUTS SEG NFR BLD AUTO: 64 % (ref 43–75)
NRBC BLD AUTO-RTO: 0 /100 WBCS
PLATELET # BLD AUTO: 319 THOUSANDS/UL (ref 149–390)
PMV BLD AUTO: 9.8 FL (ref 8.9–12.7)
POTASSIUM SERPL-SCNC: 3.4 MMOL/L (ref 3.5–5.3)
PROT SERPL-MCNC: 8.4 G/DL (ref 6.4–8.4)
RBC # BLD AUTO: 5.36 MILLION/UL (ref 3.88–5.62)
SODIUM SERPL-SCNC: 137 MMOL/L (ref 135–147)
WBC # BLD AUTO: 7.34 THOUSAND/UL (ref 4.31–10.16)

## 2024-11-12 PROCEDURE — 99285 EMERGENCY DEPT VISIT HI MDM: CPT | Performed by: EMERGENCY MEDICINE

## 2024-11-12 PROCEDURE — 85652 RBC SED RATE AUTOMATED: CPT | Performed by: EMERGENCY MEDICINE

## 2024-11-12 PROCEDURE — 80053 COMPREHEN METABOLIC PANEL: CPT | Performed by: EMERGENCY MEDICINE

## 2024-11-12 PROCEDURE — 36415 COLL VENOUS BLD VENIPUNCTURE: CPT | Performed by: EMERGENCY MEDICINE

## 2024-11-12 PROCEDURE — 96374 THER/PROPH/DIAG INJ IV PUSH: CPT

## 2024-11-12 PROCEDURE — 84484 ASSAY OF TROPONIN QUANT: CPT | Performed by: EMERGENCY MEDICINE

## 2024-11-12 PROCEDURE — 85025 COMPLETE CBC W/AUTO DIFF WBC: CPT | Performed by: EMERGENCY MEDICINE

## 2024-11-12 PROCEDURE — 96375 TX/PRO/DX INJ NEW DRUG ADDON: CPT

## 2024-11-12 PROCEDURE — 70498 CT ANGIOGRAPHY NECK: CPT

## 2024-11-12 PROCEDURE — 70496 CT ANGIOGRAPHY HEAD: CPT

## 2024-11-12 PROCEDURE — 93005 ELECTROCARDIOGRAM TRACING: CPT

## 2024-11-12 PROCEDURE — 99284 EMERGENCY DEPT VISIT MOD MDM: CPT

## 2024-11-12 PROCEDURE — 83735 ASSAY OF MAGNESIUM: CPT | Performed by: EMERGENCY MEDICINE

## 2024-11-12 RX ORDER — METOCLOPRAMIDE HYDROCHLORIDE 5 MG/ML
10 INJECTION INTRAMUSCULAR; INTRAVENOUS ONCE
Status: COMPLETED | OUTPATIENT
Start: 2024-11-12 | End: 2024-11-12

## 2024-11-12 RX ORDER — DIPHENHYDRAMINE HYDROCHLORIDE 50 MG/ML
25 INJECTION INTRAMUSCULAR; INTRAVENOUS ONCE
Status: COMPLETED | OUTPATIENT
Start: 2024-11-12 | End: 2024-11-12

## 2024-11-12 RX ORDER — AMLODIPINE BESYLATE 5 MG/1
10 TABLET ORAL ONCE
Status: COMPLETED | OUTPATIENT
Start: 2024-11-12 | End: 2024-11-12

## 2024-11-12 RX ADMIN — METOCLOPRAMIDE 10 MG: 5 INJECTION, SOLUTION INTRAMUSCULAR; INTRAVENOUS at 15:25

## 2024-11-12 RX ADMIN — IOHEXOL 85 ML: 350 INJECTION, SOLUTION INTRAVENOUS at 14:35

## 2024-11-12 RX ADMIN — AMLODIPINE BESYLATE 10 MG: 5 TABLET ORAL at 13:50

## 2024-11-12 RX ADMIN — DIPHENHYDRAMINE HYDROCHLORIDE 25 MG: 50 INJECTION, SOLUTION INTRAMUSCULAR; INTRAVENOUS at 15:26

## 2024-11-14 LAB
ATRIAL RATE: 84 BPM
P AXIS: 56 DEGREES
PR INTERVAL: 154 MS
QRS AXIS: 61 DEGREES
QRSD INTERVAL: 88 MS
QT INTERVAL: 396 MS
QTC INTERVAL: 467 MS
T WAVE AXIS: 50 DEGREES
VENTRICULAR RATE: 84 BPM

## 2024-11-14 PROCEDURE — 93010 ELECTROCARDIOGRAM REPORT: CPT | Performed by: INTERNAL MEDICINE

## 2024-11-26 NOTE — ED PROVIDER NOTES
Time reflects when diagnosis was documented in both MDM as applicable and the Disposition within this note       Time User Action Codes Description Comment    11/12/2024  4:28 PM Enedina Haney [G43.909] Migraine     11/12/2024  4:28 PM Enedina Haney [R20.2] Paresthesias           ED Disposition       ED Disposition   Discharge    Condition   Stable    Date/Time   Tue Nov 12, 2024  4:38 PM    Comment   Laron Gordon discharge to home/self care.                   Assessment & Plan       Medical Decision Making  49-year-old male 3 days of left-sided headache left arm paresthesias and intermittent chest pain.  Patient is well-appearing on arrival hypertensive to 200s systolic with otherwise normal vitals.  Patient has subjective paresthesias on exam but no diminished sensation or objective weakness.  EKG shows normal sinus rhythm 84 bpm with no acute ST or T wave abnormalities per my independent interpretation.  Patient has a vague left temporal tenderness, no significant tenderness with percussion of sinuses, denies jaw claudication.  Patient did not take his amlodipine today so was given home dose of amlodipine.  CTA head and neck was obtained which showed no acute abnormalities.  Labs notable for normal CBC and BMP, normal electrolytes, negative troponin x 2, sed rate of 16 which is not consistent with temporal arteritis.  Patient given Reglan and Benadryl migraine cocktail with some improvement in symptoms.  Blood pressure significantly improved after amlodipine.  He is comfortable with being discharged at this time with PCP and neuro follow-up.    Amount and/or Complexity of Data Reviewed  Labs: ordered.  Radiology: ordered.    Risk  Prescription drug management.             Medications   amLODIPine (NORVASC) tablet 10 mg (10 mg Oral Given 11/12/24 1350)   iohexol (OMNIPAQUE) 350 MG/ML injection (SINGLE-DOSE) 85 mL (85 mL Intravenous Given 11/12/24 1435)   metoclopramide (REGLAN) injection 10 mg (10 mg  Intravenous Given 11/12/24 1525)   diphenhydrAMINE (BENADRYL) injection 25 mg (25 mg Intravenous Given 11/12/24 1526)       ED Risk Strat Scores                          SBIRT 20yo+      Flowsheet Row Most Recent Value   Initial Alcohol Screen: US AUDIT-C     1. How often do you have a drink containing alcohol? 5 Filed at: 11/12/2024 1322   2. How many drinks containing alcohol do you have on a typical day you are drinking?  1 Filed at: 11/12/2024 1322   3a. Male UNDER 65: How often do you have five or more drinks on one occasion? 0 Filed at: 11/12/2024 1322   3b. FEMALE Any Age, or MALE 65+: How often do you have 4 or more drinks on one occassion? 0 Filed at: 11/12/2024 1322   Audit-C Score 6 Filed at: 11/12/2024 1322   ZANA: How many times in the past year have you...    Used an illegal drug or used a prescription medication for non-medical reasons? Never Filed at: 11/12/2024 1322                            History of Present Illness       Chief Complaint   Patient presents with    Headache     Reports L sided face pressure, periorbital, x3 days, intermittent chest pain, generalized tingling L side       Past Medical History:   Diagnosis Date    Chest pain 1/12/2021    Diabetes mellitus (HCC)     Exposure to SARS-associated coronavirus 10/5/2020    Hypertension     Viral upper respiratory tract infection 10/7/2020      History reviewed. No pertinent surgical history.   Family History   Problem Relation Age of Onset    Diabetes Mother     Diabetes Father       Social History     Tobacco Use    Smoking status: Every Day     Current packs/day: 1.00     Average packs/day: 0.5 packs/day for 29.8 years (15.3 ttl pk-yrs)     Types: Cigarettes     Start date: 2/18/1995     Passive exposure: Past    Smokeless tobacco: Never   Vaping Use    Vaping status: Never Used   Substance Use Topics    Alcohol use: Yes     Comment: every other    Drug use: Yes     Types: Marijuana      E-Cigarette/Vaping    E-Cigarette Use Never User        E-Cigarette/Vaping Substances    Nicotine No     THC No     CBD No     Flavoring No     Other No     Unknown No       I have reviewed and agree with the history as documented.     49-year-old male history of hypertension diabetes presents with 3 days of left-sided headache, intermittent chest pain, tingling to left arm.  He has had similar symptoms previously that have not lasted this long.  He denies shortness of breath.  No fever or chills.  No true numbness or weakness.  No ambulatory difficulty.        Review of Systems   All other systems reviewed and are negative.          Objective       ED Triage Vitals [11/12/24 1312]   Temperature Pulse Blood Pressure Respirations SpO2 Patient Position - Orthostatic VS   98.7 °F (37.1 °C) 94 (!) 204/117 20 98 % Lying      Temp Source Heart Rate Source BP Location FiO2 (%) Pain Score    Oral Monitor Left arm -- 7      Vitals      Date and Time Temp Pulse SpO2 Resp BP Pain Score FACES Pain Rating User   11/12/24 1545 -- 63 99 % 15 150/87 -- -- CB   11/12/24 1515 -- 71 99 % 18 193/100 -- --    11/12/24 1415 -- 75 96 % 15 186/106 -- --    11/12/24 1315 -- 88 97 % 20 204/117 7 --    11/12/24 1312 98.7 °F (37.1 °C) 94 98 % 20 204/117 7 -- EJN            Physical Exam  Constitutional:       General: He is not in acute distress.  HENT:      Head: Normocephalic and atraumatic.      Nose: Nose normal.      Mouth/Throat:      Mouth: Mucous membranes are moist.   Eyes:      Extraocular Movements: Extraocular movements intact.      Conjunctiva/sclera: Conjunctivae normal.   Cardiovascular:      Rate and Rhythm: Normal rate and regular rhythm.      Heart sounds: Normal heart sounds.   Pulmonary:      Effort: Pulmonary effort is normal.      Breath sounds: Normal breath sounds.   Musculoskeletal:         General: Normal range of motion.      Cervical back: Neck supple.      Right lower leg: No edema.      Left lower leg: No edema.   Skin:     General: Skin is warm and dry.    Neurological:      General: No focal deficit present.      Mental Status: He is alert and oriented to person, place, and time.      Cranial Nerves: No cranial nerve deficit.      Coordination: Coordination normal.      Gait: Gait normal.      Comments: Left arm paresthesias without diminished sensation or weakness   Psychiatric:         Mood and Affect: Mood normal.         Behavior: Behavior normal.         Results Reviewed       Procedure Component Value Units Date/Time    HS Troponin I 2hr [[id number]]  (Normal) Collected: 11/12/24 1551    Lab Status: Final result Specimen: Blood from Arm, Left Updated: 11/12/24 1619     hs TnI 2hr 4 ng/L      Delta 2hr hsTnI 0 ng/L     HS Troponin 0hr (reflex protocol) [[id number]]  (Normal) Collected: 11/12/24 1351    Lab Status: Final result Specimen: Blood from Arm, Left Updated: 11/12/24 1419     hs TnI 0hr 4 ng/L     Comprehensive metabolic panel [[id number]]  (Abnormal) Collected: 11/12/24 1351    Lab Status: Final result Specimen: Blood from Arm, Left Updated: 11/12/24 1413     Sodium 137 mmol/L      Potassium 3.4 mmol/L      Chloride 97 mmol/L      CO2 30 mmol/L      ANION GAP 10 mmol/L      BUN 7 mg/dL      Creatinine 0.96 mg/dL      Glucose 124 mg/dL      Calcium 9.7 mg/dL      AST 36 U/L      ALT 37 U/L      Alkaline Phosphatase 90 U/L      Total Protein 8.4 g/dL      Albumin 4.8 g/dL      Total Bilirubin 0.55 mg/dL      eGFR 93 ml/min/1.73sq m     Narrative:      [employer] guidelines for Chronic Kidney Disease (CKD):     Stage 1 with normal or high GFR (GFR > 90 mL/min/1.73 square meters)    Stage 2 Mild CKD (GFR = 60-89 mL/min/1.73 square meters)    Stage 3A Moderate CKD (GFR = 45-59 mL/min/1.73 square meters)    Stage 3B Moderate CKD (GFR = 30-44 mL/min/1.73 square meters)    Stage 4 Severe CKD (GFR = 15-29 mL/min/1.73 square meters)    Stage 5 End Stage CKD (GFR <15 mL/min/1.73 square meters)  Note: GFR calculation is accurate only with a  steady state creatinine    Magnesium [[id number]]  (Normal) Collected: 11/12/24 1351    Lab Status: Final result Specimen: Blood from Arm, Left Updated: 11/12/24 1413     Magnesium 2.0 mg/dL     Sedimentation rate, automated [[id number]]  (Abnormal) Collected: 11/12/24 1351    Lab Status: Final result Specimen: Blood from Arm, Left Updated: 11/12/24 1413     Sed Rate 16 mm/hour     CBC and differential [[id number]] Collected: 11/12/24 1351    Lab Status: Final result Specimen: Blood from Arm, Left Updated: 11/12/24 1358     WBC 7.34 Thousand/uL      RBC 5.36 Million/uL      Hemoglobin 14.9 g/dL      Hematocrit 46.1 %      MCV 86 fL      MCH 27.8 pg      MCHC 32.3 g/dL      RDW 15.0 %      MPV 9.8 fL      Platelets 319 Thousands/uL      nRBC 0 /100 WBCs      Segmented % 64 %      Immature Grans % 0 %      Lymphocytes % 26 %      Monocytes % 9 %      Eosinophils Relative 0 %      Basophils Relative 1 %      Absolute Neutrophils 4.68 Thousands/µL      Absolute Immature Grans 0.03 Thousand/uL      Absolute Lymphocytes 1.91 Thousands/µL      Absolute Monocytes 0.63 Thousand/µL      Eosinophils Absolute 0.02 Thousand/µL      Basophils Absolute 0.07 Thousands/µL             CTA head and neck with and without contrast   Final Interpretation by Skyler Floyd MD (11/12 5296)      CT Brain:  No acute intracranial abnormality.      CTA head: Negative for large vessel intracranial occlusion or hemodynamically significant stenosis.      CTA neck:  No extracranial carotid stenosis.  The cervical vertebral arteries are patent.                     Workstation performed: [id number]             Procedures    ED Medication and Procedure Management   Prior to Admission Medications   Prescriptions Last Dose Informant Patient Reported? Taking?   Blood Glucose Monitoring Suppl (ONE TOUCH ULTRA 2) w/Device KIT Unknown  No No   Sig: Use 3 (three) times a day   Blood Pressure Monitoring (Blood Pressure Monitor/M Cuff) MISC Unknown  No  No   Sig: Use 2 (two) times a day   Lancets (onetouch ultrasoft) lancets Unknown  No No   Sig: Use as instructed   amLODIPine (NORVASC) 10 mg tablet 11/12/2024  No Yes   Sig: Take 1 tablet (10 mg total) by mouth daily   aspirin (ECOTRIN LOW STRENGTH) 81 mg EC tablet More than a month  No No   Sig: Take 1 tablet (81 mg total) by mouth daily   atorvastatin (LIPITOR) 20 mg tablet 11/12/2024  No Yes   Sig: Take 1 tablet (20 mg total) by mouth daily   cholecalciferol (VITAMIN D3) 1,000 units tablet More than a month  No No   Sig: Take 1 tablet (1,000 Units total) by mouth daily   glucose blood (OneTouch Ultra) test strip Unknown  No No   Sig: Use as instructed   metFORMIN (GLUCOPHAGE-XR) 500 mg 24 hr tablet 11/12/2024  No Yes   Sig: Take 1 tablet (500 mg total) by mouth daily with lunch   nicotine (NICODERM CQ) 21 mg/24 hr TD 24 hr patch Unknown  No No   Sig: Place 1 patch on the skin over 24 hours daily   pantoprazole (PROTONIX) 40 mg tablet More than a month  No No   Sig: Take 1 tablet (40 mg total) by mouth 2 (two) times a day   terbinafine (LamISIL) 1 % cream More than a month  No No   Sig: Apply topically 2 (two) times a day      Facility-Administered Medications: None     Discharge Medication List as of 11/12/2024  4:38 PM        CONTINUE these medications which have NOT CHANGED    Details   amLODIPine (NORVASC) 10 mg tablet Take 1 tablet (10 mg total) by mouth daily, Starting Wed 1/10/2024, Until Tue 11/12/2024, Normal      atorvastatin (LIPITOR) 20 mg tablet Take 1 tablet (20 mg total) by mouth daily, Starting Wed 1/10/2024, Normal      metFORMIN (GLUCOPHAGE-XR) 500 mg 24 hr tablet Take 1 tablet (500 mg total) by mouth daily with lunch, Starting Wed 1/10/2024, Normal      aspirin (ECOTRIN LOW STRENGTH) 81 mg EC tablet Take 1 tablet (81 mg total) by mouth daily, Starting Wed 1/10/2024, Normal      Blood Glucose Monitoring Suppl (ONE TOUCH ULTRA 2) w/Device KIT Use 3 (three) times a day, Starting u 12/14/2023,  Normal      Blood Pressure Monitoring (Blood Pressure Monitor/M Cuff) MISC Use 2 (two) times a day, Starting Thu 12/14/2023, Normal      cholecalciferol (VITAMIN D3) 1,000 units tablet Take 1 tablet (1,000 Units total) by mouth daily, Starting Fri 1/12/2024, Normal      glucose blood (OneTouch Ultra) test strip Use as instructed, Normal      Lancets (onetouch ultrasoft) lancets Use as instructed, Normal      nicotine (NICODERM CQ) 21 mg/24 hr TD 24 hr patch Place 1 patch on the skin over 24 hours daily, Starting Fri 7/12/2024, Normal      pantoprazole (PROTONIX) 40 mg tablet Take 1 tablet (40 mg total) by mouth 2 (two) times a day, Starting Tue 1/9/2024, Normal      terbinafine (LamISIL) 1 % cream Apply topically 2 (two) times a day, Starting Fri 1/12/2024, Normal           No discharge procedures on file.  ED SEPSIS DOCUMENTATION   Time reflects when diagnosis was documented in both MDM as applicable and the Disposition within this note       Time User Action Codes Description Comment    11/12/2024  4:28 PM Enedina Haney [G43.909] Migraine     11/12/2024  4:28 PM Enedina Haney [R20.2] Paresthesias                  Enedina Haney MD  11/28/24 0804